# Patient Record
Sex: MALE | Race: WHITE | NOT HISPANIC OR LATINO | Employment: OTHER | URBAN - METROPOLITAN AREA
[De-identification: names, ages, dates, MRNs, and addresses within clinical notes are randomized per-mention and may not be internally consistent; named-entity substitution may affect disease eponyms.]

---

## 2020-08-12 ENCOUNTER — OFFICE VISIT (OUTPATIENT)
Dept: FAMILY MEDICINE | Facility: CLINIC | Age: 35
End: 2020-08-12
Payer: COMMERCIAL

## 2020-08-12 VITALS
WEIGHT: 112.4 LBS | OXYGEN SATURATION: 97 % | HEART RATE: 65 BPM | DIASTOLIC BLOOD PRESSURE: 67 MMHG | RESPIRATION RATE: 16 BRPM | TEMPERATURE: 98.3 F | SYSTOLIC BLOOD PRESSURE: 100 MMHG | BODY MASS INDEX: 17.64 KG/M2 | HEIGHT: 67 IN

## 2020-08-12 DIAGNOSIS — F41.1 GENERALIZED ANXIETY DISORDER: ICD-10-CM

## 2020-08-12 DIAGNOSIS — Z83.438 FAMILY HISTORY OF HYPERLIPIDEMIA: ICD-10-CM

## 2020-08-12 DIAGNOSIS — R00.2 PALPITATIONS: Primary | ICD-10-CM

## 2020-08-12 DIAGNOSIS — D22.9 BENIGN NEVUS: ICD-10-CM

## 2020-08-12 DIAGNOSIS — Z83.3 FAMILY HISTORY OF DIABETES MELLITUS: ICD-10-CM

## 2020-08-12 LAB
CHOLEST SERPL-MCNC: 210.9 MG/DL (ref 0–200)
CHOLEST/HDLC SERPL: 4.5 {RATIO} (ref 0–5)
GLUCOSE CASUAL: 83 MG/DL (ref 51–200)
HDLC SERPL-MCNC: 47.2 MG/DL
LDLC SERPL CALC-MCNC: 146 MG/DL (ref 0–129)
TRIGL SERPL-MCNC: 87.7 MG/DL (ref 0–150)
VLDL CHOLESTEROL: 17.5 MG/DL (ref 7–32)

## 2020-08-12 SDOH — HEALTH STABILITY: MENTAL HEALTH: HOW OFTEN DO YOU HAVE A DRINK CONTAINING ALCOHOL?: NOT ASKED

## 2020-08-12 ASSESSMENT — MIFFLIN-ST. JEOR: SCORE: 1398.47

## 2020-08-12 NOTE — PROGRESS NOTES
Preceptor Attestation:    Patient seen and evaluated in person. I discussed the patient with the resident. I personally viewed the EKG and agree with the interpretation documented by the resident. I have verified the content of the note, which accurately reflects my assessment of the patient and the plan of care.   Supervising Physician:  Cavlin Jensen MD.

## 2020-08-12 NOTE — LETTER
"August 17, 2020      Vikash Murillo  [address]  [facility] [address]        Dear Vikash,    Thank you for getting your care at [facility]. Please see below for your test results.    Resulted Orders   Lipid Cascade ([facility])   Result Value Ref Range    Cholesterol 210.9 (H) 0.0 - 200.0 mg/dL    Cholesterol/HDL Ratio 4.5 0.0 - 5.0    HDL Cholesterol 47.2 >40.0 mg/dL    Triglycerides 87.7 0.0 - 150.0 mg/dL    VLDL Cholesterol 17.5 7.0 - 32.0 mg/dL    LDL Cholesterol Calculated 146 (H) 0 - 129 mg/dL   Glucose Casual ([facility])   Result Value Ref Range    Glucose Casual 83.0 51.0 - 200.0 mg/dL     Your labs look good! Your LDL (\"bad cholesterol\") is slightly high, but not high enough that I would be concerned about it. Would just recommend what you are already doing which is good diet and exercise. Keep up the good work and let me know if there is anything else I can help with!    If you have any concerns about these results please call and leave a message for me or send a MyCFanchimpt message to the clinic.    Sincerely,    Jeffry Levy MD    "

## 2020-08-12 NOTE — PROGRESS NOTES
Male New Patient Note          HPI         Concerns today:     1. Complete physical. No PMH, Surgical Hx.     2. Mole on abdomen. Got darker two years ago, but hasn't changed since and not growing. No bleeding.    3. Palpitations, worse when running. Last for 1-2 seconds at a time. No chest pain, shortness of breath, syncope, pre-syncope.    4. Has also been anxious recently. Usually uses meditation, mindfulness to help. Has done counseling in the past. Currently his living situation is stressful for him and he is looking to move. He thinks his anxiety will be improved when this happens and declines any need for any other assistance with anxiety today.       There is no problem list on file for this patient.      History reviewed. No pertinent past medical history.     Previous Medical Care   [facility]     Family History   Problem Relation Age of Onset     Arthritis Mother      Diabetes Father      Lung Cancer Father      Heart Disease Maternal Grandfather      Hyperlipidemia Brother               Review of Systems:     Review of Systems:  A 10 point review of systems was performed and was negative except as noted above.    Sleep:   Do you snore most or the night (as reported by a family member)? No  Do you feel sleepy or extremely tired during most of the day? No           Social History     Social History     Socioeconomic History     Marital status: Single     Spouse name: Not on file     Number of children: Not on file     Years of education: Not on file     Highest education level: Not on file   Occupational History     Not on file   Social Needs     Financial resource strain: Not on file     Food insecurity     Worry: Not on file     Inability: Not on file     Transportation needs     Medical: Not on file     Non-medical: Not on file   Tobacco Use     Smoking status: Never Smoker     Smokeless tobacco: Never Used   Substance and Sexual Activity     Alcohol use: Not Currently     Drug use: Not Currently  "    Sexual activity: Yes     Partners: Female     Birth control/protection: Inserts/Ring   Lifestyle     Physical activity     Days per week: Not on file     Minutes per session: Not on file     Stress: Not on file   Relationships     Social connections     Talks on phone: Not on file     Gets together: Not on file     Attends Hoahaoism service: Not on file     Active member of club or organization: Not on file     Attends meetings of clubs or organizations: Not on file     Relationship status: Not on file     Intimate partner violence     Fear of current or ex partner: Not on file     Emotionally abused: Not on file     Physically abused: Not on file     Forced sexual activity: Not on file   Other Topics Concern     Not on file   Social History Narrative     Not on file       Marital Status:Single  Who lives in your household? Him and 2 roommates    Has anyone hurt you physically, for example by pushing, hitting, slapping or kicking you or forcing you to have sex? Denies  Do you feel threatened or controlled by a partner, ex-partner or anyone in your life? Denies    Sexual Health     Sexual concerns: No   STI History: Neg           Physical Exam:     Vitals: /67   Pulse 65   Temp 98.3  F (36.8  C) (Oral)   Resp 16   Ht 1.702 m (5' 7\")   Wt 51 kg (112 lb 6.4 oz)   SpO2 97%   BMI 17.60 kg/m    BMI= Body mass index is 17.6 kg/m .  Vitals were reviewed and were normal  GENERAL: healthy, alert and no distress  EYES: Eyes grossly normal to inspection, extraocular movements - intact, and PERRL  RESP: lungs clear to auscultation - no rales, no rhonchi, no wheezes  CV: regular rates and rhythm, normal S1 S2, no S3 or S4 and no murmur, no click or rub -  ABDOMEN: soft, no tenderness, no  hepatosplenomegaly, no masses, normal bowel sounds  MS: extremities- no gross deformities noted, no edema  SKIN: no suspicious lesions, no rashes; 2 mm round dark brown lesion noted on right lower abdomen with consistent " coloration throughout under examination with dermatoscope  NEURO: strength and tone- normal, sensory exam- grossly normal, mentation- intact, speech- normal, reflexes- symmetric  PSYCH: Alert and oriented times 3; speech- coherent , normal rate and volume; able to articulate logical thoughts, able to abstract reason, no tangential thoughts, no hallucinations or delusions, affect- anxious    Assessment and Plan      Vikash was seen today for establish care.    Diagnoses and all orders for this visit:    Palpitations  Likely these are PVC's as he is healthy and based on the history. We elected to do EKG today in clinic, which was normal and not concerning. I recommended returning for follow-up if worsening or if he wants to have a true diagnosis. Would consider Zio patch if he does return for this.   -     EKG 12-lead complete w/read - Clinics    Family history of diabetes mellitus  -     Glucose Casual ([facility])    Family history of hyperlipidemia  -     Lipid Cascade ([facility])    Generalized anxiety disorder  Has strategies in place. No additional assistance needed today. Would consider therapy again if worsens.    Benign nevus  Mole on abdomen appears benign under dermatoscope. Recommend follow-up if changing.      Options for treatment and follow-up care were reviewed with the patient. Vikash Murillo engaged in the decision making process and verbalized understanding of the options discussed and agreed with the final plan.    Jeffry Levy MD

## 2021-01-04 ENCOUNTER — HEALTH MAINTENANCE LETTER (OUTPATIENT)
Age: 36
End: 2021-01-04

## 2021-01-06 ENCOUNTER — OFFICE VISIT (OUTPATIENT)
Dept: FAMILY MEDICINE | Facility: CLINIC | Age: 36
End: 2021-01-06
Payer: COMMERCIAL

## 2021-01-06 VITALS
HEART RATE: 84 BPM | DIASTOLIC BLOOD PRESSURE: 64 MMHG | HEIGHT: 67 IN | BODY MASS INDEX: 17.92 KG/M2 | TEMPERATURE: 98.1 F | WEIGHT: 114.2 LBS | OXYGEN SATURATION: 98 % | SYSTOLIC BLOOD PRESSURE: 106 MMHG

## 2021-01-06 DIAGNOSIS — R00.2 PALPITATIONS: Primary | ICD-10-CM

## 2021-01-06 PROCEDURE — 99214 OFFICE O/P EST MOD 30 MIN: CPT | Mod: GC | Performed by: STUDENT IN AN ORGANIZED HEALTH CARE EDUCATION/TRAINING PROGRAM

## 2021-01-06 ASSESSMENT — MIFFLIN-ST. JEOR: SCORE: 1406.64

## 2021-01-06 NOTE — PATIENT INSTRUCTIONS
1. Look up CBT in relation to anxiety. If interested after reading more about it just let me know our therapist here do it.     2. Will order zio patch for you to try and identify if this are just feeling or a actual heart beat issue.     Here is the plan from today's visit    1. Palpitations  =    Thank you for coming to [facility] today.  Lab Testing:  **If you had lab testing today and your results are reassuring or normal they will be mailed to you or sent through [employer] within 7 days.   **If the lab tests need quick action we will call you with the results.  The phone number we will call with results is # [phone] (home) . If this is not the best number please call our clinic and change the number.  Medication Refills:  If you need any refills please call your pharmacy and they will contact us.   If you need to  your refill at a new pharmacy, please contact the new pharmacy directly. The new pharmacy will help you get your medications transferred faster.   Scheduling:  If you have any concerns about today's visit or wish to schedule another appointment please call our office during normal business hours [phone] (8-5:00 M-F)  If a referral was made to a [facility] and you don't get a call from central scheduling please call [phone].  If a Mammogram was ordered for you at [facility] call [phone] to schedule or change your appointment.  If you had an XRay/CT/Ultrasound/MRI ordered the number is [phone] to schedule or change your radiology appointment.   Medical Concerns:  If you have urgent medical concerns please call [phone] at any time of the day.    Austin Au MD

## 2021-01-06 NOTE — PROGRESS NOTES
Assessment & Plan     Palpitations    Review of external notes as documented above  - Humberto Buck 8/12 note and ECG       -8/12 ECG Notable for mild bradycardia and poor R wave progression in V5 V6 (my interpretation). Bradycardia likely explained by patient w high resting vagal tone and exercise; avid runner and . No history of pulmonary pathology; suspect poor r wave progression is red herring and can be a normal variant.   - given patient's symptoms occur at rest and exercise we discussed exercise EKG versus Zio patch.  Patient preferred having the ability to press the button when he was feeling symptoms and have it recorded.  So placed order for Zio patch.  Suspect palpitations are likely driven from anxiety.  In regards to anxiety, patient like to do an increase in his yoga and meditation.  Would like to look into CBT but was not open to referral at this time.  Medications will be his last option; advised to continue to stay off anxiety given it can induce palpitations and worsen anxiety      No follow-ups on file. Follow-up to be determined by zuleima Au MD  [facility]    Subjective     Vikash Murillo is a 35 year old who presents to clinic today for the following health issues     HPI     No caffeine or alcohol       Palpitations - throughout past 8 years or so and sometimes exercise related. But now more frequently and sometimes at rest. Can get a pre syncopal feeling occasionally w exercise. THC use the night before can precede the symptoms. Has stopped THC now after reading it can cause arrhythmia (aobut 1 week or so now off it). But hasn't noticed much change in symptoms so far.   - total time seems couple secords or so of fluttering in chest and then a tightness and feeling pounding in chest for minutes 10-20 or so after. Gets a little bit of faint feeling/light headedness for a minute or so when it first happens as well.   - Feels it more often at  "exercise but a good number of them happen at night as well.     Anxiety   Have lots anxiety about job, living situation. 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 Has done less yoga this year which he thinks makes anxiety worse as well. Meds would be a last resort for him. Would like to try just being more consistent with yoga/meditation.         Review of Systems   Constitutional, HEENT, cardiovascular, pulmonary, gi and gu systems are negative, except as otherwise noted.      Objective    /64   Pulse 84   Temp 98.1  F (36.7  C) (Oral)   Ht 1.702 m (5' 7\")   Wt 51.8 kg (114 lb 3.2 oz)   SpO2 98%   BMI 17.89 kg/m    Body mass index is 17.89 kg/m .     Physical Exam  Constitutional:       General: He is not in acute distress.     Appearance: He is well-developed.   HENT:      Head: Normocephalic and atraumatic.   Eyes:      General:         Right eye: No discharge.         Left eye: No discharge.      Conjunctiva/sclera: Conjunctivae normal.   Cardiovascular:      Rate and Rhythm: Normal rate and regular rhythm.      Pulses: Normal pulses.      Heart sounds: Normal heart sounds. No murmur.   Pulmonary:      Effort: Pulmonary effort is normal. No respiratory distress.   Musculoskeletal:         General: No deformity.   Skin:     Coloration: Skin is not pale.      Findings: No erythema or rash.   Neurological:      Mental Status: He is alert and oriented to person, place, and time.      Coordination: Coordination normal.   Psychiatric:         Mood and Affect: Mood normal.      Comments: Mood okay affect anxious                    "

## 2021-01-13 NOTE — PROGRESS NOTES
Preceptor Attestation:   Patient seen, evaluated and discussed with the resident. I have verified the content of the note, which accurately reflects my assessment of the patient and the plan of care.   Supervising Physician:  Cinthya Oconnor, DO

## 2021-03-09 ENCOUNTER — OFFICE VISIT (OUTPATIENT)
Dept: FAMILY MEDICINE | Facility: CLINIC | Age: 36
End: 2021-03-09
Payer: COMMERCIAL

## 2021-03-09 VITALS
HEART RATE: 66 BPM | RESPIRATION RATE: 16 BRPM | SYSTOLIC BLOOD PRESSURE: 97 MMHG | WEIGHT: 117 LBS | DIASTOLIC BLOOD PRESSURE: 68 MMHG | TEMPERATURE: 98.4 F | HEIGHT: 68 IN | BODY MASS INDEX: 17.73 KG/M2 | OXYGEN SATURATION: 99 %

## 2021-03-09 DIAGNOSIS — S93.505A SPRAIN OF FIFTH TOE OF LEFT FOOT, INITIAL ENCOUNTER: Primary | ICD-10-CM

## 2021-03-09 DIAGNOSIS — Z28.21 INFLUENZA VACCINATION DECLINED: ICD-10-CM

## 2021-03-09 PROCEDURE — 99213 OFFICE O/P EST LOW 20 MIN: CPT | Performed by: STUDENT IN AN ORGANIZED HEALTH CARE EDUCATION/TRAINING PROGRAM

## 2021-03-09 ASSESSMENT — MIFFLIN-ST. JEOR: SCORE: 1431.96

## 2021-03-09 NOTE — PATIENT INSTRUCTIONS
Patient Education     Toe Sprain  A sprain is a stretching or tearing of the ligaments that hold a joint together. There are no broken bones. Sprains generally take from 3-6 weeks to heal. A toe sprain may be treated by taping the injured toe to the next toe. This is called buddy taping. This protects the injured toe and holds it in position. Mild sprains may not need any additional support. If the toenail has been hurt badly, it may fall off in 1-2 weeks. A new one will usually start to grow back within a month.  Home care    Keep your leg elevated above heart level when sitting or lying down. This is very important during the first 48 hours to reduce swelling. Stay off the injured foot as much as possible until you can walk on it without pain. If needed, you may use crutches during the first week for this purpose. Crutches can be rented at many pharmacies or surgical/orthopedic supply stores.    You may be given a cast shoe to wear to prevent movement in your toe. If not, you can use a sandal or any shoe that does not put pressure on the injured toe until the swelling and pain go away. If using a sandal, be careful not to hit your foot against anything, since another injury could make the sprain worse.    Apply an ice pack over the injured area for 15 to 20 minutes every 3 to 6 hours. You should do this for the first 24 to 48 hours. You can make an ice pack by filling a plastic bag that seals at the top with ice cubes and then wrapping it with a thin towel. Continue to use ice packs for relief of pain and swelling as needed. As the ice melts, be careful to avoid getting your wrap, splint, or cast wet. As the ice melts, be careful to avoid getting any wrap, splint, tape, or cast wet. After 48 hours, apply heat from a warm shower or bath for 20 minutes several times daily. Alternating ice and heat may also be helpful.    If buddy tape was applied and it becomes wet or dirty, change it. You may replace it with paper,  plastic or cloth tape. Cloth tape and paper tapes must be kept dry. Apply gauze or cotton padding between the toes, especially near webbed area. This will help prevent the skin from getting moist and breaking down. Keep the don tape in place for at least 4 weeks, or as advised by your healthcare provider.    You may use over-the-counter pain medicine to control pain, unless another pain medicine was prescribed. If you have chronic liver or kidney disease or ever had a stomach ulcer or gastrointestinal bleeding, talk with your healthcare provider before using these medicines.    You may return to sports after healing, when you can run without pain.    Follow-up care  Follow up with your with your healthcare provider as advised. Sometimes fractures don t show up on the first X-ray. Bruises and sprains can sometimes hurt as much as a fracture. These injuries can take time to heal completely. If your symptoms don t improve or they get worse, talk with your healthcare provider. You may need a repeat X-ray. If X-rays were taken, you will be told of any new findings that may affect your care.  When to seek medical advice  Call your healthcare provider right away if any of these occur:    Redness, warmth, or fluid drainage from your toe    Pain or swelling increases    Toes become cold, blue, numb, or tingly  [employer] last reviewed this educational content on 5/1/2018 2000-2020 [employer]. All rights reserved. This information is not intended as a substitute for professional medical care. Always follow your healthcare professional's instructions.

## 2021-03-09 NOTE — PROGRESS NOTES
"    Assessment & Plan     Vikash was seen today for pain.    Diagnoses and all orders for this visit:    Sprain of fifth toe of left foot, initial encounter    Influenza vaccination declined    Discussed supportive cares and routine healing. See AVS. Discussed low yield of inaging, offered, and pt declined. Discussed taping. Recommended ibuprofen 600mg q6h prn. Also pt declined flu shot.     12 minutes spent on the date of the encounter doing chart review, history and exam, documentation and further activities as noted above      Return if symptoms worsen or fail to improve.    Kyle Santana DO  [facility] LUIS ENRIQUE Suh is a 36 year old who presents for the following health issues     HPI   Foot pain  Left small toe. Injured about a month ago. Slipped on stairs. No LOC. Or head injury. Iced for a few days and still some pain. Not bad enough to take meds. Just making sure he should not be doing anything else.         Objective    BP 97/68   Pulse 66   Temp 98.4  F (36.9  C) (Oral)   Resp 16   Ht 1.73 m (5' 8.11\")   Wt 53.1 kg (117 lb)   SpO2 99%   BMI 17.73 kg/m    Body mass index is 17.73 kg/m .  Physical Exam  Constitutional:       General: He is not in acute distress.     Appearance: He is well-developed.   HENT:      Head: Normocephalic and atraumatic.   Eyes:      General: No scleral icterus.     Extraocular Movements: Extraocular movements intact.   Cardiovascular:      Rate and Rhythm: Normal rate.      Heart sounds: Normal heart sounds.   Pulmonary:      Effort: Pulmonary effort is normal. No respiratory distress.   Musculoskeletal:      Right foot: Normal.      Left foot: Tenderness (small toe) present.      Comments: NO tenderness at L base of 5th MT   Neurological:      General: No focal deficit present.      Mental Status: He is alert and oriented to person, place, and time.   Psychiatric:         Thought Content: Thought content normal.           "

## 2021-10-11 ENCOUNTER — HEALTH MAINTENANCE LETTER (OUTPATIENT)
Age: 36
End: 2021-10-11

## 2022-01-26 ENCOUNTER — OFFICE VISIT (OUTPATIENT)
Dept: FAMILY MEDICINE | Facility: CLINIC | Age: 37
End: 2022-01-26
Payer: COMMERCIAL

## 2022-01-26 VITALS
HEIGHT: 68 IN | RESPIRATION RATE: 16 BRPM | WEIGHT: 117 LBS | SYSTOLIC BLOOD PRESSURE: 99 MMHG | TEMPERATURE: 98.1 F | OXYGEN SATURATION: 98 % | BODY MASS INDEX: 17.73 KG/M2 | HEART RATE: 73 BPM | DIASTOLIC BLOOD PRESSURE: 63 MMHG

## 2022-01-26 DIAGNOSIS — M79.644 PAIN IN FINGER OF BOTH HANDS: Primary | ICD-10-CM

## 2022-01-26 DIAGNOSIS — F41.1 GENERALIZED ANXIETY DISORDER: ICD-10-CM

## 2022-01-26 DIAGNOSIS — M79.645 PAIN IN FINGER OF BOTH HANDS: Primary | ICD-10-CM

## 2022-01-26 DIAGNOSIS — D22.9 ENLARGED SKIN MOLE: ICD-10-CM

## 2022-01-26 PROCEDURE — 99214 OFFICE O/P EST MOD 30 MIN: CPT | Performed by: FAMILY MEDICINE

## 2022-01-26 ASSESSMENT — MIFFLIN-ST. JEOR: SCORE: 1431.96

## 2022-01-26 NOTE — PROGRESS NOTES
Assessment & Plan     1. Pain in finger of both hands  Likely related to increased activity (guitar playing, occupational) and would benefit from exercises and techniques for preservation. No sign of CTS on exam.   - Occupational Therapy Referral; Future - hand therapy - they will call you     2. Enlarged skin mole  2 hyperpigmented skin tags vs. Moles on back that are irritating and pt would request removed. 1 large flat mole on center back that should be considered for removal based on size and difficulty to self-monitor  - Procedure Clinic - [facility] Internal Referral; Future - call to schedule at [facility]  [phone]    3. Digestive Problems  Beano or Alpha-Galactosidase with legumes/chickpeas    4. Generalized Anxiety Disorder  - Therapy referral for [facility] - they will call you    30 minutes spent on the date of the encounter doing chart review, history and exam, documentation and further activities per the note    No follow-ups on file.    Irene Mcgill MD  [facility] is a 36 year old who presents for the following health issues   HPI     Finger pain  Doesn't use phone as much as other people  Thumb motion or apping  Knuckles aching constantly  All 10 finger tips feel bruised, shockwave when using phone/tapping screen  x2-3 months  Not able to rest long enough for them to heal up  Plays piano and guitar.   Guitar playing has increased over the past 1 month  Started piano tunning, big hammer, adjusts in small incriments for 2 hours at a time, exacerbates thumb pain  Has had small amount of joint pain (wrist) which improved with bone broth and yoga (vegetarian).   Mom has raynauds    Moles  Right temple, back of neck, upper back, abdomen  Abdomen mole has changed since high school, maybe it has changed again  Big back moles are annoying  Has had one mole removed 20 years ago (normal?)    Digestive problems  Vegetarian  Many years  Small meals and  "snack a lot, fill up fast \"small body person\"  Chickpeas, kidney beans. Will eat a small amount and feel bloated and feel uncomfortably full for many hours  hummus not a problem, whole chickpeas are the problem  Previously thought had a gluten sensitiviy, but doesn't adjust diet because too difficult, will notice same symptoms if he eats a lot of wheat.   Experienced heartburn for first time in life this week \"after eating a bunch of crampy foods\"    Review of Systems         Objective    BP 99/63   Pulse 73   Temp 98.1  F (36.7  C) (Oral)   Resp 16   Ht 1.73 m (5' 8.11\")   Wt 53.1 kg (117 lb)   SpO2 98%   BMI 17.73 kg/m    Body mass index is 17.73 kg/m .  Physical Exam  Skin:                    Back:      5. Right abdomen:      4. Right mid-back      2. Center back:      3. Left shoulder:              "

## 2022-01-26 NOTE — PATIENT INSTRUCTIONS
Patient Education   Here is the plan from today's visit    1. Pain in finger of both hands  - Occupational Therapy Referral; Future - hand therapy - they will call you     2. Enlarged skin mole  - Procedure Clinic - [facility] Internal Referral; Future - call to schedule at [place]  [phone]    3. Digestive Problems  Beano or Alpha-Galactosidase with legumes/chickpeas    4. Generalized Anxiety Disorder  - Therapy referral for [facility] - they will call you    Please call or return to clinic if your symptoms don't go away.    Follow up plan  No follow-ups on file.    Thank you for coming to [facility] today.  Lab Testing:  **If you had lab testing today and your results are reassuring or normal they will be mailed to you or sent through [email or web address] within 7 days.   **If the lab tests need quick action we will call you with the results.  **If you are having labs done on a different day, please call [phone] to schedule at [facility] or [phone] for other [facility] Outpatient Lab locations. Labs do not offer walk-in appointments.  The phone number we will call with results is # [phone] (home) . If this is not the best number please call our clinic and change the number.  Medication Refills:  If you need any refills please call your pharmacy and they will contact us.   If you need to  your refill at a new pharmacy, please contact the new pharmacy directly. The new pharmacy will help you get your medications transferred faster.   Scheduling:  If you have any concerns about today's visit or wish to schedule another appointment please call our office during normal business hours [phone] (8-5:00 M-F)  If a referral was made to an [facility] specialty provider and you do not get a call from central scheduling, please refer to directions on your visit summary or call our office during normal business hours for assistance.   If a M[facility] was [facility] for you at [facility] call [phone] to schedule or change your appointment.  If you had an XRay/CT/Ultrasound/MRI ordered the number is [phone] to schedule or change your radiology appointment.   [facility] has limited ultrasound appointments available on Wednesdays, if you would like your ultrasound at [facility], please call [phone] to schedule.   Medical Concerns:  If you have urgent medical concerns please call [phone] at any time of the day.    Irene Mcgill MD

## 2022-01-30 ENCOUNTER — HEALTH MAINTENANCE LETTER (OUTPATIENT)
Age: 37
End: 2022-01-30

## 2022-02-01 NOTE — PROGRESS NOTES
Hand Therapy Initial Evaluation    Current Date:  2/4/2022    Diagnosis: Pain in finger of both hands  DOI: ~3-6 months (1/26/2022 MD order date)    Subjective:  Vikash Murillo is a 36 year old male.    Patient reports symptoms of the bilateral thumbs and fingers which occurred due to gradual onset/unknown. Since onset symptoms are Gradually getting worse.  General health as reported by patient is good.  Pertinent medical history includes:None  Medical allergies:none.  Surgical history: none.  Medication history: None.    Current occupation is music therapist,   Job Tasks: Computer Work, Repetitive Tasks    Occupational Profile Information:  Right hand dominant  Prior functional level:  independent-shared household chores  Patient reports symptoms of pain, stiffness/loss of motion and edema  Special tests:  see chart.    Previous treatment: None  Barriers include:none  Mobility: No difficulty  Transportation: drives  Currently working in normal job without restrictions  Leisure activities/hobbies: playing guitar, climbing, ice skating  Other: Pt has a dog, enjoys reading and walking with his dog    Functional Outcome Measure:   Upper Extremity Functional Index Score:  SCORE:   Column Totals: /80: 69   (A lower score indicates greater disability.)    Objective:  Pain Level (Scale 0-10)   2/4/2022   At Rest 4/10   With Use 6/10     Pain Description  Date 2/4/2022   Location thumb, index finger, long finger, ring finger and small finger   Pain Quality Aching and Dull   Frequency constant     Pain is worst  daytime   Exacerbated by  use, texting, piano tuning   Relieved by rest and none   Progression Gradually wrosening     Edema  None    Sensation   WNL throughout all nerve distributions; per patient report    ROM   WNL per visual observation    Strength   (Measured in pounds)  Pain Report: - none  + mild    ++ moderate    +++ severe    2/4/2022 2/4/2022   Trials R L   1  2  3 66 71   Average 66 71     Lat  Pinch 2/4/2022 2/4/2022   Trials R L   1  2  3 23 21   Average 23 21     3 Pt Pinch 2/4/2022 2/4/2022   Trials R L   1  2  3 21 17   Average 21 17     Assessment:  Patient presents with symptoms consistent with diagnosis of bilateral finger pain, with conservative intervention.     Patient's limitations or Problem List includes:  Pain, Weakness and Tightness in musculature of the bilateral thumb, index finger and long finger which interferes with the patient's ability to perform Self Care Tasks (eating), Work Tasks, Recreational Activities, Household Chores and Driving  as compared to previous level of function.    Rehab Potential:  Excellent - Return to full activity, no limitations    Patient will benefit from skilled Occupational Therapy to increase ROM, overall strength and stability of thumb and decrease pain to return to previous activity level and resume normal daily tasks and to reach their rehab potential.    Barriers to Learning:  No barrier    Communication Issues:  Patient appears to be able to clearly communicate and understand verbal and written communication and follow directions correctly.    Chart Review: Chart Review, Brief history including review of medical and/or therapy records relating to the presenting problem and Simple history review with patient    Identified Performance Deficits: feeding, functional mobility, driving and community mobility, health management and maintenance, home establishment and management, meal preparation and cleanup, work and leisure activities    Assessment of Occupational Performance:  5 or more Performance Deficits    Clinical Decision Making (Complexity): Low complexity    Treatment Explanation:  The following has been discussed with the patient:  RX ordered/plan of care  Anticipated outcomes  Possible risks and side effects    Plan:  Frequency:  1 X week, once daily  Duration:  for 6 weeks    Treatment Plan:   Modalities:  US, Fluidotherapy and  Paraffin  Therapeutic Exercise:  AROM, AAROM, PROM, Tendon Gliding, Blocking, Reverse Blocking, Place and Hold, Contract Relax, Extensor Tracking, Isotonics, Isometrics and Stabilization  Neuromuscular re-education:  Nerve Gliding, Coordination/Dexterity, Sensory re-education, Desensitization, Kinesthetic Training, Proprioceptive Training, Posture, Kinesiotaping, Strain Counter Strain, Isometrics, Stabilization  Manual Techniques:  Coordination/Dexterity, Joint mobilization, Scar mobilization, Friction massage, Myofascial release, Manual edema mobilization  Orthotic Fabrication:  Static  Self Care:  Self Care Tasks, Ergonomic Considerations and Work Tasks    Discharge Plan:  Achieve all LTG.  Independent in home treatment program.  Reach maximal therapeutic benefit.    Home Exercise Program:  Warmth  Elsberry massage to thumb  Chip clip webspace release  Thumb adductor/webspace stretch  Forearm PROM flexor/extensor stretch  Computer ergonomics    Next Visit:  Review/progress HEP  Review computer ergonomics

## 2022-02-04 ENCOUNTER — THERAPY VISIT (OUTPATIENT)
Dept: OCCUPATIONAL THERAPY | Facility: CLINIC | Age: 37
End: 2022-02-04
Attending: FAMILY MEDICINE
Payer: COMMERCIAL

## 2022-02-04 DIAGNOSIS — M79.644 PAIN IN FINGER OF BOTH HANDS: ICD-10-CM

## 2022-02-04 DIAGNOSIS — M79.645 PAIN IN FINGER OF BOTH HANDS: ICD-10-CM

## 2022-02-04 DIAGNOSIS — M79.645 BILATERAL THUMB PAIN: ICD-10-CM

## 2022-02-04 DIAGNOSIS — M79.644 BILATERAL THUMB PAIN: ICD-10-CM

## 2022-02-04 PROCEDURE — 97530 THERAPEUTIC ACTIVITIES: CPT | Mod: GO | Performed by: OCCUPATIONAL THERAPIST

## 2022-02-04 PROCEDURE — 97165 OT EVAL LOW COMPLEX 30 MIN: CPT | Mod: GO | Performed by: OCCUPATIONAL THERAPIST

## 2022-02-04 PROCEDURE — 97110 THERAPEUTIC EXERCISES: CPT | Mod: GO | Performed by: OCCUPATIONAL THERAPIST

## 2022-02-04 NOTE — PROGRESS NOTES
[facility]    OUTPATIENT Occupational Therapy ORTHOPEDIC EVALUATION  PLAN OF TREATMENT FOR OUTPATIENT REHABILITATION  (COMPLETE FOR INITIAL CLAIMS ONLY)  Patient's Last Name, First Name, M.I.  YOB: 1985  Vikash Murillo       Provider s Name:  [facility]   Medical Record No.  [id number]   Start of Care Date:  02/04/22   Onset Date:   01/26/22 (MD order date)   Type:     ___PT   _X__OT Medical Diagnosis:    Encounter Diagnosis   Name Primary?     Pain in finger of both hands         Treatment Diagnosis:  B finger pain        Goals:     02/04/22 0500   Goal #1   Goal #1 household chores   Previous Performance Level Independent   Current Functional Task Push   Current Performance Level 6/10 pain   STG Target Perfomance [facility]   STG Target Perform Level 5/10 pain grasping dishes   Due Date 03/18/22   LTG Target Task/Performance Pain free household chores   Due Date 04/29/22       Therapy Frequency:  1x/week  Predicted Duration of Therapy Intervention:  6 weeks    Carol Johansen OT                 I CERTIFY THE NEED FOR THESE SERVICES FURNISHED UNDER        THIS PLAN OF TREATMENT AND WHILE UNDER MY CARE     (Physician attestation of this document indicates review and certification of the therapy plan).                       Certification Date From:  02/04/22   Certification Date To:  05/04/22    Referring Provider:  Irene Mcgill    Initial Assessment        See Epic Evaluation SOC Date: 02/04/22

## 2022-02-11 ENCOUNTER — THERAPY VISIT (OUTPATIENT)
Dept: OCCUPATIONAL THERAPY | Facility: CLINIC | Age: 37
End: 2022-02-11
Payer: COMMERCIAL

## 2022-02-11 DIAGNOSIS — M79.644 PAIN IN FINGER OF BOTH HANDS: ICD-10-CM

## 2022-02-11 DIAGNOSIS — M79.645 PAIN IN FINGER OF BOTH HANDS: ICD-10-CM

## 2022-02-11 DIAGNOSIS — M79.645 BILATERAL THUMB PAIN: ICD-10-CM

## 2022-02-11 DIAGNOSIS — M79.644 BILATERAL THUMB PAIN: ICD-10-CM

## 2022-02-11 PROCEDURE — 97110 THERAPEUTIC EXERCISES: CPT | Mod: GO | Performed by: OCCUPATIONAL THERAPIST

## 2022-02-17 ENCOUNTER — THERAPY VISIT (OUTPATIENT)
Dept: OCCUPATIONAL THERAPY | Facility: CLINIC | Age: 37
End: 2022-02-17
Payer: COMMERCIAL

## 2022-02-17 DIAGNOSIS — M79.644 BILATERAL THUMB PAIN: ICD-10-CM

## 2022-02-17 DIAGNOSIS — M79.644 PAIN IN FINGER OF BOTH HANDS: ICD-10-CM

## 2022-02-17 DIAGNOSIS — M79.645 PAIN IN FINGER OF BOTH HANDS: ICD-10-CM

## 2022-02-17 DIAGNOSIS — M79.645 BILATERAL THUMB PAIN: ICD-10-CM

## 2022-02-17 PROCEDURE — 97535 SELF CARE MNGMENT TRAINING: CPT | Mod: GO | Performed by: OCCUPATIONAL THERAPIST

## 2022-02-17 PROCEDURE — 97110 THERAPEUTIC EXERCISES: CPT | Mod: GO | Performed by: OCCUPATIONAL THERAPIST

## 2022-03-25 ENCOUNTER — TELEPHONE (OUTPATIENT)
Dept: BEHAVIORAL HEALTH | Facility: CLINIC | Age: 37
End: 2022-03-25
Payer: COMMERCIAL

## 2022-03-25 NOTE — TELEPHONE ENCOUNTER
Salmar spoke with pt and scheduled initial TC therapy appointment on 03/31/22 @ 2 pm. Writer has sent documents via my chart and appointment reminder. Writer will reply to referral source.    Aleyda Ganrera  03/25/22  1217  ----- Message from Aubrey Atkins sent at 3/25/2022 12:01 PM CDT -----  Regarding: transition clinic referral  Transition Clinic Referral   [place] Only   Limited [place] Availability    Type of Referral:      __X__Therapy  _____Therapy & Medication (Psychiatry next level of care appointment needs to be scheduled)  _____Medication Only (Psychiatry next level of care appointment needs to be scheduled)  _____Diagnostic Assessment Only      Referring Provider Name: Aubrey Atkins    Clinician completing the assessment.     Referring Provider: OTHER: OP Intake    If known, referring provider Phone Number: [phone]    Reason for Transition Clinic Referral: Pt has a referral for therapy. Please help bridge the wait until they can be seen. Thanks!    Next Level of Care Patient Will Be Transitioned To:  [facility] Department: [facility]     Start Date for Next Level of Care Therapy (Required): 8/16/22  Provider  [facility]   Location Department: [facility]     Start Date for Next Level of Care Medication (Required): N/A  Provider  Location  TC Psychiatry cannot see patients who do not have active medical insurance    What Would Be Helpful from the Transition Clinic: Pt has a referral for therapy. Please help bridge the wait until they can be seen. Thanks!     Needs: NO    Does Patient Have Access to Technology: yes    Patient E-mail Address: [email or web address]    Current Patient Phone Number: [phone];     Clinician Gender Preference (if applicable): NO    Aubrey Atkins

## 2022-03-29 ENCOUNTER — OFFICE VISIT (OUTPATIENT)
Dept: FAMILY MEDICINE | Facility: CLINIC | Age: 37
End: 2022-03-29
Attending: FAMILY MEDICINE
Payer: COMMERCIAL

## 2022-03-29 VITALS
RESPIRATION RATE: 16 BRPM | TEMPERATURE: 97.5 F | DIASTOLIC BLOOD PRESSURE: 67 MMHG | HEART RATE: 81 BPM | WEIGHT: 114.4 LBS | SYSTOLIC BLOOD PRESSURE: 99 MMHG | OXYGEN SATURATION: 100 % | BODY MASS INDEX: 17.34 KG/M2

## 2022-03-29 DIAGNOSIS — D22.9 ENLARGED SKIN MOLE: Primary | ICD-10-CM

## 2022-03-29 DIAGNOSIS — L91.8 SKIN TAG: ICD-10-CM

## 2022-03-29 PROCEDURE — 88305 TISSUE EXAM BY PATHOLOGIST: CPT | Performed by: DERMATOLOGY

## 2022-03-29 PROCEDURE — 88342 IMHCHEM/IMCYTCHM 1ST ANTB: CPT | Performed by: DERMATOLOGY

## 2022-03-29 PROCEDURE — 11300 SHAVE SKIN LESION 0.5 CM/<: CPT | Mod: GC | Performed by: FAMILY MEDICINE

## 2022-03-29 PROCEDURE — 11301 SHAVE SKIN LESION 0.6-1.0 CM: CPT | Mod: GC | Performed by: FAMILY MEDICINE

## 2022-03-29 PROCEDURE — 99207 PR DROP WITH A PROCEDURE: CPT | Mod: 25 | Performed by: FAMILY MEDICINE

## 2022-03-29 NOTE — PROGRESS NOTES
[facility]  Skin [facility] and Skin Tag Removal Procedure Note    Vikash Murillo is a patient of Nadine Javier here for atypical nevus and skin tag removal.  Indications: Irritated moles versus skin tags and one enlarged atypical nevus    Consent: Affirmation of informed consent was signed and scanned into the medical record. Risks, benefits and alternatives were discussed. Patient's questions were elicited and answered.   Procedure safety checklist was completed:  Yes  Time Out (Pause for the Cause) completed: Yes    Preoperative Diagnosis: atypical nevus and Skin Tag   Location and size: LEFT mid back (1 cm)     Postoperative Diagnosis: same    Technique:   Skin prep Betadine  Anesthesia 2.5 cc 1% lidocaine, with epi   Biopsy size 1.2 cm  Biopsy taken via (shave, punch, incisional) Shave  Suture  none   Hemostasis  Drysol    EBL:    5 ml  Complications:  No  Tolerance:   Pt tolerated procedure well and was in stable condition.   Pathology sent Yes    Technique:   Used curved iris scissor to remove 2 skin tags in the following locations: Right mid/low back and LEFT upper shoulder  Skin prep drysol  Anesthesia none  EBL:   2 ml  Complications:  No  Tolerance:  Pt tolerated procedure well and was in stable condition. Sent for pathology.     Instructions:    Pt should keep dressing in place for 24 hours, then may change and apply antibiotic ointment and simple bandage. May shower after 24 hours.  Pt was instructed to call if bleeding, severe pain or foul smell.   Follow up in 2 weeks.      Resident: Asia Paiz MD  Faculty: Jan Andrews MD present for and supervised this entire procedure.

## 2022-03-29 NOTE — PROGRESS NOTES
Preceptor Attestation:   I was present for and supervised the entire procedure. I have verified the content of the note, which accurately reflects my assessment of the patient and the plan of care.   Supervising Physician:  Jan Andrews MD.

## 2022-03-29 NOTE — PATIENT INSTRUCTIONS
Patient Education   Here is the plan from today's visit    1. Enlarged skin mole  Keep dry for 24 hours  Apply triple antibiotic ointment and bandages every day after the techderm falls or or take off after 3 days from procedure  Can use tylenol or Ibuprofen for pain  Follow up in 2 weeks to assess healing   - Procedure Clinic - [facility] Internal Referral  - Dermatological Path Order and Indications; Standing  - Dermatological Path Order and Indications  - trunk, arms, legs  - trunk, arms, legs    2. Skin tag  - Dermatological Path Order and Indications; Standing  - Dermatological Path Order and Indications  - trunk, arms, legs  - trunk, arms, legs    Please call or return to clinic if your symptoms don't go away.    Follow up plan  Return in about 2 weeks (around 4/12/2022) for Wound check .    Thank you for coming to [facility] today.  Lab Testing:  **If you had lab testing today and your results are reassuring or normal they will be mailed to you or sent through [employer] within 7 days.   **If the lab tests need quick action we will call you with the results.  **If you are having labs done on a different day, please call [phone] to schedule at [facility] or [phone] for other [facility] Outpatient Lab locations. Labs do not offer walk-in appointments.  The phone number we will call with results is # [phone] (home) . If this is not the best number please call our clinic and change the number.  Medication Refills:  If you need any refills please call your pharmacy and they will contact us.   If you need to  your refill at a new pharmacy, please contact the new pharmacy directly. The new pharmacy will help you get your medications transferred faster.   Scheduling:  If you have any concerns about today's visit or wish to schedule another appointment please call our office during normal business hours [phone] (8-5:00 M-F)  If a referral was made to an [facility] specialty  provider and you do not get a call from central scheduling, please refer to directions on your visit summary or call our office during normal business hours for assistance.   If a Mammogram was ordered for you at the Breast Center call [phone] to schedule or change your appointment.  If you had an XRay/CT/Ultrasound/MRI ordered the number is [phone] to schedule or change your radiology appointment.   [facility] has limited ultrasound appointments available on Wednesdays, if you would like your ultrasound at [facility], please call [phone] to schedule.   Medical Concerns:  If you have urgent medical concerns please call [phone] at any time of the day.    Jan Andrews MD

## 2022-03-31 ENCOUNTER — DOCUMENTATION ONLY (OUTPATIENT)
Dept: BEHAVIORAL HEALTH | Facility: CLINIC | Age: 37
End: 2022-03-31
Payer: COMMERCIAL

## 2022-03-31 ENCOUNTER — VIRTUAL VISIT (OUTPATIENT)
Dept: BEHAVIORAL HEALTH | Facility: CLINIC | Age: 37
End: 2022-03-31
Payer: COMMERCIAL

## 2022-03-31 DIAGNOSIS — F41.8 OTHER SPECIFIED ANXIETY DISORDERS: Primary | ICD-10-CM

## 2022-03-31 NOTE — PROCEDURES
"      [facility]    Progress Note - Initial Visit    Patient  Name:  Vikash Murillo Date: 3/31/2022         Service Type: Individual     Visit Start Time: 1400  Visit End Time: 1451    Visit #: 1    Attendees: Client attended alone    Service Modality:  Video Visit:      Provider verified identity through the following two step process.  Patient provided:  Patient  and Patient address    Telemedicine Visit: The patient's condition can be safely assessed and treated via synchronous audio and visual telemedicine encounter.      Reason for Telemedicine Visit: Services only offered telehealth    Originating Site (Patient Location): Patient's home    Distant Site (Provider Location): [facility]    Consent:  The patient/guardian has verbally consented to: the potential risks and benefits of telemedicine (video visit) versus in person care; bill my insurance or make self-payment for services provided; and responsibility for payment of non-covered services.     Patient would like the video invitation sent by:  My Chart    Mode of Communication:  Video Conference via [employer]    As the provider I attest to compliance with applicable laws and regulations related to telemedicine.       DATA:   Interactive Complexity: No   Crisis: No     Presenting Concerns/  Current Stressors:   Vikash was referred to the Transition Clinic via his PCP due to experiencing symptoms of anxiety. Vikash reported he has had numerous triggers for worried thoughts including his job, finances, relationship, and \"life\". This has led to having many thoughts around these triggers, but not knowing what to do about them. Vikash has used therapy in the past to relieve anxiety. Vikash has a next level of care for 2022 with [facility].      Discussed transitional services being short term which Vikash agreed to and noted he would like to continue with clinician for short term. Clinician also noted they " would send referrals for clinics where Vikash could start long term therapy sooner. Vikash and clinician focused on explored his triggers for his anxiety and how he experiences anxiety. Explored how meditation and yoga has helped with anxiety in the past and discussed how to start incorporating these practices into daily life.       ASSESSMENT:  Mental Status Assessment:  Appearance:   Appropriate   Eye Contact:   Good   Psychomotor Behavior: Normal   Attitude:   Cooperative   Orientation:   All  Speech   Rate / Production: Normal/ Responsive   Volume:  Normal   Mood:    Normal  Affect:    Appropriate   Thought Content:  Clear   Thought Form:  Coherent  Logical   Insight:    Good       Safety Issues and Plan for Safety and Risk Management:   [place] Suicide Severity Rating Scale (Short Version)No flowsheet data found.  Patient denies current fears or concerns for personal safety.  Patient denies current or recent suicidal ideation or behaviors.  Patient denies current or recent homicidal ideation or behaviors.  Patient denies current or recent self injurious behavior or ideation.  Patient denies other safety concerns.  Recommended that patient call 911 or go to the local ED should there be a change in any of these risk factors.  Patient reports there are no firearms in the house.     Diagnostic Criteria:  Unspecified Anxiety Disorder  The client does not report enough symptoms for the full criteria of any specific Anxiety Disorder to have been met   - Excessive anxiety and worry about a number of events or activities (such as work or school performance).    - The person finds it difficult to control the worry.   - Difficulty concentrating or mind going blank.       DSM5 Diagnoses: (Sustained by DSM5 Criteria Listed Above)  Diagnoses: 300.09 (F41.8) Other Specified Anxiety Disorder   Psychosocial & Contextual Factors: work,financial, relationship stressors  WHODAS 2.0 (12 item):   WHODAS 2.0 Total Score 3/31/2022  3/31/2022   Total Score 18 18   Total Score MyChart - 18     Intervention:   Mindfulness- Patient was educated on relaxation and mindfulness techniques and Educated on treatment planning and started identifying goals and interventions for treatment plan  Collateral Reports Completed:  Not Applicable      PLAN: (Homework, other):  1. Provider will continue Diagnostic Assessment.  Patient was given the following to do until next session:  Use meditation at least one time per day.     2. Provider recommended the following referrals: long term outpatient therapy     3.  Suicide Risk and Safety Concerns were assessed for Vikash Murillo.    Patient meets the following risk assessment and triage: Patient denied any current/recent/lifetime history of suicidal ideation and/or behaviors.  No safety plan indicated at this time.       Dianne Mann LP  March 31, 2022

## 2022-03-31 NOTE — PROGRESS NOTES
"    [facility]  Provider Name:  Dianne Mann     Credentials:  JILL Conte    PATIENT'S NAME: Vikash Murillo  PREFERRED NAME: Vikash  PRONOUNS:       MRN: [id number]  : 1985  ADDRESS: [address] [place] [address] [address]  [facility] [address]  ACCT. NUMBER:  [id number]  DATE OF SERVICE: 3/31/22  START TIME: 1400  END TIME: 1451  PREFERRED PHONE: [phone]  May we leave a program related message: Yes  SERVICE MODALITY:  Video Visit:      Provider verified identity through the following two step process.  Patient provided:  Patient  and Patient address    Telemedicine Visit: The patient's condition can be safely assessed and treated via synchronous audio and visual telemedicine encounter.      Reason for Telemedicine Visit: Services only offered telehealth    Originating Site (Patient Location): Patient's home    Distant Site (Provider Location): [facility] HE[facility] & ADDI[facility]    Consent:  The patient/guardian has verbally consented to: the potential risks and benefits of telemedicine (video visit) versus in person care; bill my insurance or make self-payment for services provided; and responsibility for payment of non-covered services.     Patient would like the video invitation sent by:  My Chart    Mode of Communication:  Video Conference via Amwell    As the provider I attest to compliance with applicable laws and regulations related to telemedicine.    UNIVERSAL ADULT Mental Health DIAGNOSTIC ASSESSMENT    Identifying Information:  Patient is a 37 year old,  .  The pronoun use throughout this assessment reflects the patient's chosen pronoun.  Patient was referred for an assessment by self.  Patient attended the session alone.    Chief Complaint:   The reason for seeking services at this time is: \"Feeling overwhelmed and consistently down lately. Financial stress, existential sadness (not extreme), dissatisfaction with work. Hard to pinpoint " "when it began. Some of it many years ago.\".  The problem(s) began 03/03/22.    Patient has attempted to resolve these concerns in the past through therapy .    Social/Family History:  Patient reported they grew up in [place], [place].  They were raised by biological parents  .  Parents  / .  Patient reported that their childhood was \"okay, didn't feel listened to\".  Patient described their current relationships with family of origin as \"okay\".     The patient describes their cultural background as .  Cultural influences and impact on patient's life structure, values, norms, and healthcare: Grew up Syn[employer], never connected with it. I resonate more with eastern spiritual influences, to an extent. I meditate and practiced yoga regularly but that is unrelated to how I grew up..  Contextual influences on patient's health include: Individual Factors stress affects overall health .    These factors will be addressed in the Preliminary Treatment plan. Patient identified their preferred language to be English. Patient reported they does not need the assistance of an  or other support involved in therapy.     Patient reported had no significant delays in developmental tasks.   Patient's highest education level was college graduate  .  Patient identified the following learning problems: none reported.  Modifications will not be used to assist communication in therapy.  Patient reports they are  able to understand written materials.    Patient reported the following relationship history: .  Patient's current relationship status is has a partner or significant other for single.   Patient identified their sexual orientation as heterosexual.  Patient reported having   child(kwesi). Patient identified partner; mother; friends as part of their support system.  Patient identified the quality of these relationships as fair,  .      Patient's current living/housing situation involves staying in " own home/apartment.  The immediate members of family and household include Juli Murillo, 45,Sister  and they report that housing is stable.    Patient is currently employed part time.  Patient reports their finances are obtained through employment. Patient does identify finances as a current stressor.      Patient reported that they have not been involved with the legal system.   . Patient does not report being under probation/ parole/ jurisdiction. They are not under any current court jurisdiction. .    Patient's Strengths and Limitations:  Patient identified the following strengths or resources that will help them succeed in treatment: commitment to health and well being, friends / good social support, insight and intelligence. Things that may interfere with the patient's success in treatment include: financial hardship.     Assessments:  The following assessments were completed by patient for this visit:  PHQ9:   PHQ-9 SCORE 3/31/2022   PHQ-9 Total Score MyChart 6 (Mild depression)   PHQ-9 Total Score 6       Personal and Family Medical History:  Patient does not report a family history of mental health concerns.  Patient reports family history includes Arthritis in his mother; Diabetes in his father; Heart Disease in his maternal grandfather; Hyperlipidemia in his brother; Lung Cancer in his father..     Patient does report Mental Health Diagnosis and/or Treatment.  Patient Patient reported the following previous diagnoses which include(s):   .  Patient reported symptoms began around 10 years ago.  Patient has received mental health services in the past:     .  Psychiatric Hospitalizations:n/a   Patient denies a history of civil commitment.  Currently, patient   is not receiving other mental health services.  These include none.         Patient has not had a physical exam to rule out medical causes for current symptoms.  Date of last physical exam was greater than a year ago and client was encouraged to  schedule an exam with PCP. The patient has a [facility] Provider, who is named Nadine Dominique..  Patient reports no current medical and/or dental concerns.  Patient reports pain concerns including pain in hands.  Patient does not want help addressing pain concerns.   There are not significant appetite / nutritional concerns / weight changes.   Patient does not report a history of head injury / trauma / cognitive impairment.      Patient reports not taking any current medications    Medication Adherence:  Patient reports not currently prescribed.      Patient Allergies:  No Known Allergies    Medical History:  No past medical history on file.      Current Mental Status Exam:   Appearance:  Appropriate    Eye Contact:  Good   Psychomotor:  Normal       Gait / station:  no problem  Attitude / Demeanor: Cooperative   Speech      Rate / Production: Normal/ Responsive      Volume:  Normal  volume      Language:  intact  Mood:   Normal  Affect:   Appropriate    Thought Content: Clear   Thought Process: Coherent  Logical       Associations: No loosening of associations  Insight:   Good   Judgment:  Intact   Orientation:  All  Attention/concentration: Good      Substance Use:  Patient did not report a family history of substance use concerns; see medical history section for details.  Patient has not received chemical dependency treatment in the past.  Patient has not ever been to detox.      Patient is not currently receiving any chemical dependency treatment.           Substance History of use Age of first use Date of last use     Pattern and duration of use (include amounts and frequency)   Alcohol never used       REPORTS SUBSTANCE USE: N/A   Cannabis   currently use high school 03/27/22 REPORTS SUBSTANCE USE: reports using substance 2 times per week      Amphetamines   never used     REPORTS SUBSTANCE USE: N/A   Cocaine/crack    never used       REPORTS SUBSTANCE USE: N/A   Hallucinogens never used          REPORTS SUBSTANCE USE: N/A   Inhalants never used         REPORTS SUBSTANCE USE: N/A   Heroin never used         REPORTS SUBSTANCE USE: N/A   Other Opiates never used     REPORTS SUBSTANCE USE: N/A   Benzodiazepine   never used     REPORTS SUBSTANCE USE: N/A   Barbiturates never used     REPORTS SUBSTANCE USE: N/A   Over the counter meds never used     REPORTS SUBSTANCE USE: N/A   Caffeine used in the past 5   REPORTS SUBSTANCE USE: N/A   Nicotine  never used     REPORTS SUBSTANCE USE: N/A   Other substances not listed above:  Identify:  never used     REPORTS SUBSTANCE USE: N/A     Patient reported the following problems as a result of their substance use: no problems, not applicable.    Substance Use: No symptoms    Based on the negative CAGE score and clinical interview there  are not indications of drug or alcohol abuse.      Significant Losses / Trauma / Abuse / Neglect Issues:   Patient did not  serve in the .  There are indications or report of significant loss, trauma, abuse or neglect issues related to: are no indications and client denies any losses, trauma, abuse, or neglect concerns.  Concerns for possible neglect are not present.     Safety Assessment:   Patient denies current homicidal ideation and behaviors.  Patient denies current self-injurious ideation and behaviors.    Patient denied risk behaviors associated with substance use.  Patient denies any high risk behaviors associated with mental health symptoms.  Patient reports the following current concerns for their personal safety: None.  Patient reports there are not firearms in the house.       There are no firearms in the home..    History of Safety Concerns:  Patient denied a history of homicidal ideation.     Patient denied a history of personal safety concerns.    Patient denied a history of assaultive behaviors.    Patient denied a history of sexual assault behaviors.     Patient denied a history of risk behaviors associated  with substance use.  Patient denies any history of high risk behaviors associated with mental health symptoms.  Patient reports the following protective factors: forward or future oriented thinking; dedication to family or friends; regular sleep; regular physical activity; sense of belonging; purpose; sense of meaning; positive social skills; strong sense of self worth or esteem    Risk Plan:  See Recommendations for Safety and Risk Management Plan    Review of Symptoms per patient report:  Depression: No symptoms, Low self-worth, Ruminations and Feeling sad, down, or depressed  Neisha:  No Symptoms  Psychosis: No Symptoms  Anxiety: Excessive worry, Social anxiety and Ruminations  Panic:  No symptoms  Post Traumatic Stress Disorder:  No Symptoms   Eating Disorder: No Symptoms  ADD / ADHD:  No symptoms  Conduct Disorder: No symptoms  Autism Spectrum Disorder: No symptoms  Obsessive Compulsive Disorder: No Symptoms    Patient reports the following compulsive behaviors and treatment history:  n/a .      Diagnostic Criteria:   Unspecified Anxiety Disorder  The client does not report enough symptoms for the full criteria of any specific Anxiety Disorder to have been met   - Excessive anxiety and worry about a number of events or activities (such as work or school performance).    - The person finds it difficult to control the worry.    Functional Status:  Patient reports the following functional impairments:  relationship(s) and work / vocational responsibilities.     Nonprogrammatic care:  Patient is requesting basic services to address current mental health concerns.    Clinical Summary:  1. Reason for assessment: anxiety  .  2. Psychosocial, Cultural and Contextual Factors: financial, relationship, and work stressors  .  3. Principal DSM5 Diagnoses  (Sustained by DSM5 Criteria Listed Above):   300.09 (F41.8) Other Specified Anxiety Disorder .  4. Other Diagnoses that is relevant to services: n/a.  5. Provisional  Diagnosis:  300.23 (F40.10) Social Anxiety Disorder as evidenced by anxiety within social settings .  6. Prognosis: Return to Normal Functioning.  7. Likely consequences of symptoms if not treated: continued anxiety.  8. Client strengths include:  caring, creative, empathetic, good listener, insightful, motivated, support of family, friends and providers and wants to learn .     Recommendations:     1. Plan for Safety and Risk Management:   Recommended that patient call 911 or go to the local ED should there be a change in any of these risk factors..          Report to child / adult protection services was NA.     2. Patient's identified  n/a .     3. Initial Treatment will focus on:    Anxiety      4. Resources/Service Plan:    services are not indicated.   Modifications to assist communication are not indicated.   Additional disability accommodations are not indicated.      5. Collaboration:   Collaboration / coordination of treatment will be initiated with the following  support professionals: outpatient therapist.      6.  Referrals:   The following referral(s) will be initiated: [facility]. Next Scheduled Appointment: 4/7/2022.     A Release of Information has been obtained for the following:  n/a .    7. COURT:    COURT:  Discussed the general effects of drugs and alcohol on health and well-being. Provider gave patient printed information about the effects of chemical use on their health and well being. Recommendations:  n/a .     8. Records:   These were reviewed at time of assessment.   Information in this assessment was obtained from the medical record and  provided by patient who is a good historian.    Patient will have open access to their mental health medical record.        Provider Name/ Credentials:  Dianne Mann PsyD, JILL  March 31, 2022

## 2022-04-04 LAB
PATH REPORT.COMMENTS IMP SPEC: NORMAL
PATH REPORT.COMMENTS IMP SPEC: NORMAL
PATH REPORT.FINAL DX SPEC: NORMAL
PATH REPORT.GROSS SPEC: NORMAL
PATH REPORT.MICROSCOPIC SPEC OTHER STN: NORMAL
PATH REPORT.RELEVANT HX SPEC: NORMAL

## 2022-04-07 ENCOUNTER — VIRTUAL VISIT (OUTPATIENT)
Dept: BEHAVIORAL HEALTH | Facility: CLINIC | Age: 37
End: 2022-04-07
Payer: COMMERCIAL

## 2022-04-07 DIAGNOSIS — F41.8 OTHER SPECIFIED ANXIETY DISORDERS: Primary | ICD-10-CM

## 2022-04-08 NOTE — PROCEDURES
[facility]                                     Progress Note    Patient Name: Vikash Murillo  Date:            Service Type: Individual      Session Start Time:   Session End Time:      Session Length: 47 minutes    Session #: 2    Attendees: Client attended alone    Service Modality:  Video Visit:      Provider verified identity through the following two step process.  Patient provided:  Patient  and Patient address    Telemedicine Visit: The patient's condition can be safely assessed and treated via synchronous audio and visual telemedicine encounter.      Reason for Telemedicine Visit: Services only offered telehealth    Originating Site (Patient Location): Patient's home    Distant Site (Provider Location): [facility] HEALTH & ADDICTION SERVICES    Consent:  The patient/guardian has verbally consented to: the potential risks and benefits of telemedicine (video visit) versus in person care; bill my insurance or make self-payment for services provided; and responsibility for payment of non-covered services.     Patient would like the video invitation sent by:  My Chart    Mode of Communication:  Video Conference via Amwell    As the provider I attest to compliance with applicable laws and regulations related to telemedicine.    DATA  Interactive Complexity: No  Crisis: No        Progress Since Last Session (Related to Symptoms / Goals / Homework):   Symptoms: No change in anxiety in the last week    Homework: Achieved / completed to satisfaction      Episode of Care Goals: Minimal progress - ACTION (Actively working towards change); Intervened by reinforcing change plan / affirming steps taken     Current / Ongoing Stressors and Concerns:    Vikash was referred to the Transition Clinic via his PCP due to experiencing symptoms of anxiety. Vikash reported he has had numerous triggers for worried thoughts including his job, finances, relationship,  "and \"life\". This has led to having many thoughts around these triggers, but not knowing what to do about them. Vikash has used therapy in the past to relieve anxiety. Vikash has a next level of care for 8/2022 with [facility].     Vikash expressed continued anxiety in the past week with experiencing two anxiety attacks. Reviewed homework from the past week of using meditation at least twice in the last week and explored his experience with this. Continued to process triggers for anxiety and finding balance. Used ACT intentions to review values and his actions or things in his life that go against his values. Also practiced using cognitive restructuring with regards to thoughts about self.      Treatment Objective(s) Addressed in This Session:   use relaxation strategies two times per day to reduce the physical symptoms of anxiety       Intervention:   CBT: cognitive restructuring, mindfulness     ACT: Values exercise    Assessments completed prior to visit:  The following assessments were completed by patient for this visit:  GAD7:   NICA-7 SCORE 3/31/2022   Total Score 6 (mild anxiety)   Total Score 6         ASSESSMENT: Current Emotional / Mental Status (status of significant symptoms):   Risk status (Self / Other harm or suicidal ideation)   Patient denies current fears or concerns for personal safety.   Patient denies current or recent suicidal ideation or behaviors.   Patient denies current or recent homicidal ideation or behaviors.   Patient denies current or recent self injurious behavior or ideation.   Patient denies other safety concerns.   Patient reports there has been no change in risk factors since their last session.     Patient reports there has been no change in protective factors since their last session.     Recommended that patient call 911 or go to the local ED should there be a change in any of these risk factors.     Appearance:   Appropriate    Eye Contact:   Good    Psychomotor Behavior: Normal "    Attitude:   Cooperative    Orientation:   All   Speech    Rate / Production: Normal     Volume:  Normal    Mood:    Normal   Affect:    Appropriate    Thought Content:  Clear    Thought Form:  Coherent  Logical    Insight:    Good      Medication Review:   No current psychiatric medications prescribed     Medication Compliance:   NA     Changes in Health Issues:   None reported     Chemical Use Review:   Substance Use: Chemical use reviewed, no active concerns identified      Tobacco Use: No current tobacco use.      Diagnosis:  F41.8 Other specified anxiety disorders        Collateral Reports Completed:   Not Applicable    PLAN: (Patient Tasks / Therapist Tasks / Other)  Continue to use meditation throughout the week        Dianne Mann LP                                                         ______________________________________________________________________    Individual Treatment Plan    Patient's Name: Vikash Murillo  YOB: 1985    Date of Creation: 4/7/2022  Date Treatment Plan Last Reviewed/Revised: 4/7/2022 (new)    DSM5 Diagnoses: 300.09 (F41.8) Other Specified Anxiety Disorder   Psychosocial / Contextual Factors: work, relationship stress  PROMIS (reviewed every 90 days): 28    Referral / Collaboration:  Was/were discussed and patient will pursue.    Anticipated number of session for this episode of care: 10  Anticipation frequency of session: Weekly  Anticipated Duration of each session: 38-52 minutes  Treatment plan will be reviewed in 90 days or when goals have been changed.       MeasurableTreatment Goal(s) related to diagnosis / functional impairment(s)  Goal 1: Patient will increase awareness of anxiety and their impact on functioning and develop skills to reduce negative impact.       Objective #A  : Patient will describe thoughts, feelings, and actions associated with anxiety.        Intervention(s) : LM will explore and process with patient how anxiety has impacted them.        Objective #B : Patient will increase distress tolerance coping skills.       Intervention(s): LMHP will teach DBT skills and model their use.    Goal 2: Patient will identify cognitive distortions in relation to their anxiety and explore alternatives.       Objective #A: Patient will identify thoughts and beliefs about self and the world as they relate to anxiety.       Intervention(s): LMHP will guide discussion and assist patient in identification of negative beliefs.       Objective #B:  Patient will challenge negative cognitions.       Intervention(s): LMHP will guide patient in examining the evidence for and against beliefs.        Patient has reviewed and agreed to the above plan.      Dianne Mann, JILL  April 8, 2022

## 2022-04-11 ENCOUNTER — OFFICE VISIT (OUTPATIENT)
Dept: FAMILY MEDICINE | Facility: CLINIC | Age: 37
End: 2022-04-11
Payer: COMMERCIAL

## 2022-04-11 VITALS
WEIGHT: 113.4 LBS | HEART RATE: 75 BPM | RESPIRATION RATE: 16 BRPM | TEMPERATURE: 98.7 F | OXYGEN SATURATION: 99 % | BODY MASS INDEX: 17.19 KG/M2 | SYSTOLIC BLOOD PRESSURE: 94 MMHG | DIASTOLIC BLOOD PRESSURE: 63 MMHG

## 2022-04-11 DIAGNOSIS — R07.9 CHEST PAIN, UNSPECIFIED TYPE: Primary | ICD-10-CM

## 2022-04-11 DIAGNOSIS — Z09 S/P EXCISION OF SKIN LESION, FOLLOW-UP EXAM: ICD-10-CM

## 2022-04-11 LAB
CHOLEST SERPL-MCNC: 200 MG/DL
FASTING STATUS PATIENT QL REPORTED: ABNORMAL
HDLC SERPL-MCNC: 50 MG/DL
LDLC SERPL CALC-MCNC: 136 MG/DL
NONHDLC SERPL-MCNC: 150 MG/DL
TRIGL SERPL-MCNC: 70 MG/DL

## 2022-04-11 PROCEDURE — 80061 LIPID PANEL: CPT | Performed by: STUDENT IN AN ORGANIZED HEALTH CARE EDUCATION/TRAINING PROGRAM

## 2022-04-11 PROCEDURE — 99213 OFFICE O/P EST LOW 20 MIN: CPT | Mod: GC | Performed by: STUDENT IN AN ORGANIZED HEALTH CARE EDUCATION/TRAINING PROGRAM

## 2022-04-11 PROCEDURE — 36415 COLL VENOUS BLD VENIPUNCTURE: CPT | Performed by: STUDENT IN AN ORGANIZED HEALTH CARE EDUCATION/TRAINING PROGRAM

## 2022-04-11 PROCEDURE — 93000 ELECTROCARDIOGRAM COMPLETE: CPT | Mod: GC | Performed by: STUDENT IN AN ORGANIZED HEALTH CARE EDUCATION/TRAINING PROGRAM

## 2022-04-11 NOTE — PROGRESS NOTES
Assessment & Plan     Chest pain, unspecified type  Pt with constant chest pain, pt concerned as father had heart diease in his 50's, feels fluttery sometimes. Possible has an abnormal rhythm, but EKG with NSR while symptomatic and no signs of ST elevation or depression. Also not concerned for CAD as not affected by activity. No TTP of sternal border so not likely costochondritis. Most likely in related to anxiety has pt notes that it is worse or more pronounced when this is present. However, if were to have fluttery feelings while asleep or affecting sleep would suggest Zio patch.   - Lipid panel; Future  - EKG 12-lead complete w/read - Clinics  - Lipid panel    S/P excision of skin lesion, follow-up exam  Pt has well healing post shave lesions, no signs of infection. Needs to let scab over at this time. Reviewed biopsy results. The atypical one does not describe if atypia goes within the margins or not. Will message pathologist to see. If no reply in one week would ask patient if okay to do an e-consult about those results to make sure does not need more area of skin removed.       Return in about 2 weeks (around 4/25/2022), or if symptoms worsen or fail to improve.    Asia Paiz MD  [facility] LUIS ENRIQUE Suh is a 37 year old who presents for the following health issues     HPI     Skin Lesion Follow Up  Seems to be doing well, itchy, continues to moisten and dress it. No bleeding. No fevers.     Chest Discomfort  All day everyday last week, every now and then gets full body flutter feeling for a few secs, like when nervous, when distracted not there, started therapy feels related to anxiety, hard to take deep breaths, not hyperventaling, maybe with increased worry. No sudden deaths young in family. FMHx of HF. No radiating pain. No numbness/tingling. Hard to say if worse with exertion versus baseline. EKG in the past normal. Not worse with deep breath. Doesn't feel like  palpitations in the past. Has had more heartburn.    Review of Systems   Constitutional, HEENT, cardiovascular, pulmonary, gi and gu systems are negative, except as otherwise noted.      Objective    BP 94/63   Pulse 75   Temp 98.7  F (37.1  C) (Oral)   Resp 16   Wt 51.4 kg (113 lb 6.4 oz)   SpO2 99%   BMI 17.19 kg/m    Body mass index is 17.19 kg/m .  Physical Exam   GENERAL: healthy, alert and no distress  EYES: Eyes grossly normal to inspection, PERRL and conjunctivae and sclerae normal  NECK: no adenopathy, no asymmetry, masses, or scars and thyroid normal to palpation  RESP: lungs clear to auscultation - no rales, rhonchi or wheezes  CV: regular rate and rhythm, normal S1 S2, no S3 or S4, no murmur, click or rub, no peripheral edema and peripheral pulses strong, no TTP of sternal borders  MS: no gross musculoskeletal defects noted, no edema  SKIN: well healing areas of biopsy sites, granulation tissue forming, no pustular discharge  PSYCH: mentation appears normal and anxious    EKG: NSR    Office Visit on 03/29/2022   Component Date Value Ref Range Status     Case Report 03/29/2022    Final                    Value:Surgical Pathology Report                         Case: [id number]                                  Authorizing Provider:  Jan Andrews MD         Collected:           03/29/2022 09:54 AM          Ordering Location:     [facility]   Received:            03/29/2022 11:45 AM                                 [facility]                                                                      Pathologist:           Cyrus Orr MD                                                       Specimens:   A) - Skin, Upper Left Shoulder skin tag versus mole                                                 B) - Skin, Left Mid Back                                                                            C) - Skin, Right low back                                                                    Final Diagnosis 03/29/2022    Final                    Value:This result contains rich text formatting which cannot be displayed here.     Clinical Information 03/29/2022    Final                    Value:This result contains rich text formatting which cannot be displayed here.     Gross Description 03/29/2022    Final                    Value:This result contains rich text formatting which cannot be displayed here.     Microscopic Description 03/29/2022    Final                    Value:This result contains rich text formatting which cannot be displayed here.     Performing Labs 03/29/2022    Final                    Value:This result contains rich text formatting which cannot be displayed here.

## 2022-04-11 NOTE — PROGRESS NOTES
Preceptor Attestation:    I discussed the patient with the resident and evaluated the patient in person. I personally viewed the EKG and agree with the interpretation documented by the resident. I have verified the content of the note, which accurately reflects my assessment of the patient and the plan of care.   Supervising Physician:  Irene Mcgill MD.

## 2022-04-13 NOTE — RESULT ENCOUNTER NOTE
Dear Vikash  Here are your labs as you can see the your bad cholesterol (LDL) is mildly elevated. This is can be addressed with 150 min/wk of moderate exercise (fast walking), increasing fiber in your diet, and trying an Mediterranean diet. Can repeat labs in 1 year.   Please message me if you have any concerns.  Sincerely,   Asia Paiz MD

## 2022-04-14 ENCOUNTER — VIRTUAL VISIT (OUTPATIENT)
Dept: BEHAVIORAL HEALTH | Facility: CLINIC | Age: 37
End: 2022-04-14
Attending: PSYCHIATRY & NEUROLOGY
Payer: COMMERCIAL

## 2022-04-14 DIAGNOSIS — F41.8 OTHER SPECIFIED ANXIETY DISORDERS: Primary | ICD-10-CM

## 2022-04-14 NOTE — PROCEDURES
[facility] Transition Clinic                                     Progress Note    Patient Name: Vikash Murillo  Date: 2022         Service Type: Individual      Session Start Time: 1400  Session End Time: 1448     Session Length: 48 minutes    Session #: 3    Attendees: Client attended alone    Service Modality:  Video Visit:      Provider verified identity through the following two step process.  Patient provided:  Patient  and Patient address    Telemedicine Visit: The patient's condition can be safely assessed and treated via synchronous audio and visual telemedicine encounter.      Reason for Telemedicine Visit: Services only offered telehealth    Originating Site (Patient Location): Patient's home    Distant Site (Provider Location): [facility]    Consent:  The patient/guardian has verbally consented to: the potential risks and benefits of telemedicine (video visit) versus in person care; bill my insurance or make self-payment for services provided; and responsibility for payment of non-covered services.     Patient would like the video invitation sent by:  My Chart    Mode of Communication:  Video Conference via [employer]    As the provider I attest to compliance with applicable laws and regulations related to telemedicine.    DATA  Interactive Complexity: No  Crisis: No        Progress Since Last Session (Related to Symptoms / Goals / Homework):   Symptoms: Improving slight impovement in anxiety     Homework: Did not complete      Episode of Care Goals: Minimal progress - PREPARATION (Decided to change - considering how); Intervened by negotiating a change plan and determining options / strategies for behavior change, identifying triggers, exploring social supports, and working towards setting a date to begin behavior change     Current / Ongoing Stressors and Concerns:        Vikash was referred to the Transition Clinic via his PCP due to  "experiencing symptoms of anxiety. Vikash reported he has had numerous triggers for worried thoughts including his job, finances, relationship, and \"life\". This has led to having many thoughts around these triggers, but not knowing what to do about them. Vikash has used therapy in the past to relieve anxiety. Vikash has a next level of care for 8/2022 with [facility].             Vikash expressed limited decrease of anxiety and went to the doctor this past week due to chest pain that had been related to anxiety. Explored how Vikash is often in \"his head\" due to anxiety and has difficulty with being in the present. Discussed use of mindfulness along with use of meditation to help be more present. Explored and processed difficulty of using meditation when anxious.      Treatment Objective(s) Addressed in This Session:   use relaxation strategies two times per day to reduce the physical symptoms of anxiety     Intervention:   CBT: Psychoeducation on mindfulness and anxiety, identify triggers for anxiety    Assessments completed prior to visit:  The following assessments were completed by patient for this visit:  GAD7:   NICA-7 SCORE 3/31/2022   Total Score 6 (mild anxiety)   Total Score 6         ASSESSMENT: Current Emotional / Mental Status (status of significant symptoms):   Risk status (Self / Other harm or suicidal ideation)   Patient denies current fears or concerns for personal safety.   Patient denies current or recent suicidal ideation or behaviors.   Patient denies current or recent homicidal ideation or behaviors.   Patient denies current or recent self injurious behavior or ideation.   Patient denies other safety concerns.   Patient reports there has been no change in risk factors since their last session.     Patient reports there has been no change in protective factors since their last session.     Recommended that patient call 911 or go to the local ED should there be a change in any of these risk " factors.     Appearance:   Appropriate    Eye Contact:   Good    Psychomotor Behavior: Normal    Attitude:   Cooperative    Orientation:   All   Speech    Rate / Production: Normal     Volume:  Normal    Mood:    Normal   Affect:    Appropriate    Thought Content:  Clear    Thought Form:  Coherent  Logical    Insight:    Good      Medication Review:   No current psychiatric medications prescribed     Medication Compliance:   NA     Changes in Health Issues:   None reported     Chemical Use Review:   Substance Use: Chemical use reviewed, no active concerns identified      Tobacco Use: No current tobacco use.      Diagnosis:  f41.8 Other specified anxiety disorders        Collateral Reports Completed:   Not Applicable    PLAN: (Patient Tasks / Therapist Tasks / Other)  Continue to use meditation  Practice use of daily mindfulness        Dianne Mann LP                                                         _______________________________________________Individual Treatment Plan     Patient's Name: Vikash Murillo                       YOB: 1985     Date of Creation: 4/7/2022  Date Treatment Plan Last Reviewed/Revised: 4/7/2022 (new)     DSM5 Diagnoses: 300.09 (F41.8) Other Specified Anxiety Disorder   Psychosocial / Contextual Factors: work, relationship stress  PROMIS (reviewed every 90 days): 28     Referral / Collaboration:  Was/were discussed and patient will pursue.     Anticipated number of session for this episode of care: 10  Anticipation frequency of session: Weekly  Anticipated Duration of each session: 38-52 minutes  Treatment plan will be reviewed in 90 days or when goals have been changed.         MeasurableTreatment Goal(s) related to diagnosis / functional impairment(s)  Goal 1: Patient will increase awareness of anxiety and their impact on functioning and develop skills to reduce negative impact.                   Objective #A  : Patient will describe thoughts, feelings, and actions  associated with anxiety.                    Intervention(s) : LMHP will explore and process with patient how anxiety has impacted them.                   Objective #B : Patient will increase distress tolerance coping skills.                   Intervention(s): LMHP will teach DBT skills and model their use.     Goal 2: Patient will identify cognitive distortions in relation to their anxiety and explore alternatives.                   Objective #A: Patient will identify thoughts and beliefs about self and the world as they relate to anxiety.                   Intervention(s): LMHP will guide discussion and assist patient in identification of negative beliefs.                   Objective #B:  Patient will challenge negative cognitions.                   Intervention(s): LMHP will guide patient in examining the evidence for and against beliefs.          Patient has reviewed and agreed to the above plan.        Dianne Mann, JILL                  April 8, 2022

## 2022-04-15 NOTE — PATIENT INSTRUCTIONS
Patient Education   Here is the plan from today's visit    1. Chest pain, unspecified type  Follow up if worsens or doesn't improve  - Lipid panel; Future  - EKG 12-lead complete w/read - Clinics  - Lipid panel    2. S/P excision of skin lesion, follow-up exam  Allow to breath and scab over    Please call or return to clinic if your symptoms don't go away.    Follow up plan  Return in about 2 weeks (around 4/25/2022), or if symptoms worsen or fail to improve.    Thank you for coming to [facility] today.  Lab Testing:  **If you had lab testing today and your results are reassuring or normal they will be mailed to you or sent through [employer] within 7 days.   **If the lab tests need quick action we will call you with the results.  **If you are having labs done on a different day, please call [phone] to schedule at [facility] or [phone] for other [facility] Outpatient Lab locations. Labs do not offer walk-in appointments.  The phone number we will call with results is # [phone] (home) . If this is not the best number please call our clinic and change the number.  Medication Refills:  If you need any refills please call your pharmacy and they will contact us.   If you need to  your refill at a new pharmacy, please contact the new pharmacy directly. The new pharmacy will help you get your medications transferred faster.   Scheduling:  If you have any concerns about today's visit or wish to schedule another appointment please call our office during normal business hours [phone] (8-5:00 M-F)  If a referral was made to an [facility] specialty provider and you do not get a call from central scheduling, please refer to directions on your visit summary or call our office during normal business hours for assistance.   If a Mammogram was ordered for you at the Breast Center call [phone] to schedule or change your appointment.  If you had an XRay/CT/Ultrasound/MRI ordered the  number is [phone] to schedule or change your radiology appointment.   [facility] has limited ultrasound appointments available on Wednesdays, if you would like your ultrasound at [facility], please call [phone] to schedule.   Medical Concerns:  If you have urgent medical concerns please call [phone] at any time of the day.    Asia Paiz MD

## 2022-04-21 ENCOUNTER — VIRTUAL VISIT (OUTPATIENT)
Dept: BEHAVIORAL HEALTH | Facility: CLINIC | Age: 37
End: 2022-04-21
Attending: PSYCHIATRY & NEUROLOGY
Payer: COMMERCIAL

## 2022-04-21 DIAGNOSIS — F41.8 OTHER SPECIFIED ANXIETY DISORDERS: Primary | ICD-10-CM

## 2022-04-21 NOTE — PROCEDURES
[facility] Transition [facility]                                     Progress Note    Patient Name: Vikash Murillo  Date: 2022         Service Type: Individual      Session Start Time: 1400  Session End Time: 1447     Session Length: 47 minutes    Session #: 4    Attendees: Client attended alone    Service Modality:  Video Visit:      Provider verified identity through the following two step process.  Patient provided:  Patient  and Patient address    Telemedicine Visit: The patient's condition can be safely assessed and treated via synchronous audio and visual telemedicine encounter.      Reason for Telemedicine Visit: Services only offered telehealth    Originating Site (Patient Location): Patient's home    Distant Site (Provider Location): [facility]    Consent:  The patient/guardian has verbally consented to: the potential risks and benefits of telemedicine (video visit) versus in person care; bill my insurance or make self-payment for services provided; and responsibility for payment of non-covered services.     Patient would like the video invitation sent by:  My Chart    Mode of Communication:  Video Conference via Amwell    As the provider I attest to compliance with applicable laws and regulations related to telemedicine.    DATA  Interactive Complexity: No  Crisis: No        Progress Since Last Session (Related to Symptoms / Goals / Homework):   Symptoms: Improving decrease in anxiety; more mindful of anxiet triggers    Homework: Achieved / completed to satisfaction      Episode of Care Goals: Minimal progress - ACTION (Actively working towards change); Intervened by reinforcing change plan / affirming steps taken     Current / Ongoing Stressors and Concerns:   Vikash was referred to the Transition Clinic via his PCP due to experiencing symptoms of anxiety. Vikash reported he has had numerous triggers for worried thoughts including his job,  "finances, relationship, and \"life\". This has led to having many thoughts around these triggers, but not knowing what to do about them. Vikash has used therapy in the past to relieve anxiety. Vikash has a next level of care for 8/2022 with [facility].    Vikash noted he attended a virtual memorial for friend who committed suicide. Processed emotions related to suicide and thoughts that have come up this week related to his friend who passed away. Also processed anxiety related to work. Used cognitive restructuring around thoughts related to work. 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      Treatment Objective(s) Addressed in This Session:   use cognitive strategies identified in therapy to challenge anxious thoughts  Identify triggers for anxiety     Intervention:   CBT: cognitive restructuring, mindfulness        ASSESSMENT: Current Emotional / Mental Status (status of significant symptoms):   Risk status (Self / Other harm or suicidal ideation)   Patient denies current fears or concerns for personal safety.   Patient denies current or recent suicidal ideation or behaviors.   Patient denies current or recent homicidal ideation or behaviors.   Patient denies current or recent self injurious behavior or ideation.   Patient denies other safety concerns.   Patient reports there has been no change in risk factors since their last session.     Patient reports there has been no change in protective factors since their last session.     Recommended that patient call 911 or go to the local ED should there be a change in any of these risk factors.     Appearance:   Appropriate    Eye Contact:   Good    Psychomotor Behavior: Normal    Attitude:   Cooperative    Orientation:   All   Speech    Rate / Production: Normal     Volume:  Normal    Mood:    Normal   Affect:    Appropriate    Thought Content:  Clear    Thought Form:  Coherent  Logical    Insight:    Good      Medication Review:   No current psychiatric " medications prescribed     Medication Compliance:   NA     Changes in Health Issues:   None reported     Chemical Use Review:   Substance Use: Chemical use reviewed, no active concerns identified      Tobacco Use: No current tobacco use.      Diagnosis:  F41.8 Other specified anxiety disorders        Collateral Reports Completed:   Not Applicable    PLAN: (Patient Tasks / Therapist Tasks / Other)  Continue to practice mindfulness        Dianne Mann Psy.D, LP                                                         ______________________________________________________________________    Patient's Name: Vikash Murillo                       YOB: 1985     Date of Creation: 4/7/2022  Date Treatment Plan Last Reviewed/Revised: 4/7/2022 (new)     DSM5 Diagnoses: 300.09 (F41.8) Other Specified Anxiety Disorder   Psychosocial / Contextual Factors: work, relationship stress  PROMIS (reviewed every 90 days): 28     Referral / Collaboration:  Was/were discussed and patient will pursue.     Anticipated number of session for this episode of care: 10  Anticipation frequency of session: Weekly  Anticipated Duration of each session: 38-52 minutes  Treatment plan will be reviewed in 90 days or when goals have been changed.         MeasurableTreatment Goal(s) related to diagnosis / functional impairment(s)  Goal 1: Patient will increase awareness of anxiety and their impact on functioning and develop skills to reduce negative impact.                   Objective #A  : Patient will describe thoughts, feelings, and actions associated with anxiety.                    Intervention(s) : LMHP will explore and process with patient how anxiety has impacted them.                   Objective #B : Patient will increase distress tolerance coping skills.                   Intervention(s): LMHP will teach DBT skills and model their use.     Goal 2: Patient will identify cognitive distortions in relation to their anxiety and explore  alternatives.                   Objective #A: Patient will identify thoughts and beliefs about self and the world as they relate to anxiety.                   Intervention(s): LMHP will guide discussion and assist patient in identification of negative beliefs.                   Objective #B:  Patient will challenge negative cognitions.                   Intervention(s): LMHP will guide patient in examining the evidence for and against beliefs.          Patient has reviewed and agreed to the above plan.        Dianne Mann, JILL                  April 8, 2022

## 2022-04-28 ENCOUNTER — VIRTUAL VISIT (OUTPATIENT)
Dept: BEHAVIORAL HEALTH | Facility: CLINIC | Age: 37
End: 2022-04-28
Attending: PSYCHIATRY & NEUROLOGY
Payer: COMMERCIAL

## 2022-04-28 DIAGNOSIS — F41.8 OTHER SPECIFIED ANXIETY DISORDERS: Primary | ICD-10-CM

## 2022-04-28 NOTE — PROCEDURES
[facility] - [facility]                                     Progress Note    Patient Name: Vikash Murillo  Date: 2022         Service Type: Individual      Session Start Time: 135  Session End Time: 1448     Session Length: 49 minutes    Session #: 5    Attendees: Client attended alone    Service Modality:  Video Visit:      Provider verified identity through the following two step process.  Patient provided:  Patient  and Patient address    Telemedicine Visit: The patient's condition can be safely assessed and treated via synchronous audio and visual telemedicine encounter.      Reason for Telemedicine Visit: Services only offered telehealth    Originating Site (Patient Location): Patient's home    Distant Site (Provider Location): [facility]    Consent:  The patient/guardian has verbally consented to: the potential risks and benefits of telemedicine (video visit) versus in person care; bill my insurance or make self-payment for services provided; and responsibility for payment of non-covered services.     Patient would like the video invitation sent by:  My Chart    Mode of Communication:  Video Conference via Amwell    As the provider I attest to compliance with applicable laws and regulations related to telemedicine.    DATA  Interactive Complexity: No  Crisis: No        Progress Since Last Session (Related to Symptoms / Goals / Homework):   Symptoms: No change since last session    Homework:  worked on between session      Episode of Care Goals: Minimal progress - PREPARATION (Decided to change - considering how); Intervened by negotiating a change plan and determining options / strategies for behavior change, identifying triggers, exploring social supports, and working towards setting a date to begin behavior change     Current / Ongoing Stressors and Concerns:    Vikash was referred to the Transition [facility] via his PCP due to  "experiencing symptoms of anxiety. iVkash reported he has had numerous triggers for worried thoughts including his job, finances, relationship, and \"life\". This has led to having many thoughts around these triggers, but not knowing what to do about them. Vikash has used therapy in the past to relieve anxiety. Vikash has a next level of care for 8/2022 with [facility].    Vikash and clinician focused on areas of life where Vikash feels \"stuck\" in them and factors for feeling stuck. Explored pros and cons of making changed in different areas. Gave psychoeducation on burn-out with work. Validated clients feelings and thoughts throughout session and also challenged negative thinking.     Treatment Objective(s) Addressed in This Session:   use cognitive strategies identified in therapy to challenge anxious thoughts  Use relaxation techniques at least one time per week to decrease anxious feeling     Intervention:   CBT: cognitive restructuring, psychoeducation    Assessments completed prior to visit:  The following assessments were completed by patient for this visit:  GAD7:   NICA-7 SCORE 3/31/2022   Total Score 6 (mild anxiety)   Total Score 6         ASSESSMENT: Current Emotional / Mental Status (status of significant symptoms):   Risk status (Self / Other harm or suicidal ideation)   Patient denies current fears or concerns for personal safety.   Patient denies current or recent suicidal ideation or behaviors.   Patient denies current or recent homicidal ideation or behaviors.   Patient denies current or recent self injurious behavior or ideation.   Patient denies other safety concerns.   Patient reports there has been no change in risk factors since their last session.     Patient reports there has been no change in protective factors since their last session.     Recommended that patient call 911 or go to the local ED should there be a change in any of these risk factors.     Appearance:   Appropriate    Eye Contact:   Good    Psychomotor " Behavior: Normal    Attitude:   Cooperative    Orientation:   All   Speech    Rate / Production: Normal     Volume:  Normal    Mood:    Normal   Affect:    Appropriate    Thought Content:  Clear    Thought Form:  Coherent  Logical    Insight:    Good      Medication Review:   No current psychiatric medications prescribed     Medication Compliance:   NA     Changes in Health Issues:   None reported     Chemical Use Review:   Substance Use: Chemical use reviewed, no active concerns identified      Tobacco Use: No current tobacco use.      Diagnosis:  F41.8 Other specified anxiety disorders        Collateral Reports Completed:   Not Applicable    PLAN: (Patient Tasks / Therapist Tasks / Other)  Continue to practice mindfulness and meditation        Dianne Mann Psy.D, LP                                                         _________________________________________________________________Patient's Name: Vikash Murillo                       YOB: 1985     Date of Creation: 4/7/2022  Date Treatment Plan Last Reviewed/Revised: 4/7/2022 (new)     DSM5 Diagnoses: 300.09 (F41.8) Other Specified Anxiety Disorder   Psychosocial / Contextual Factors: work, relationship stress  PROMIS (reviewed every 90 days): 28     Referral / Collaboration:  Was/were discussed and patient will pursue.     Anticipated number of session for this episode of care: 10  Anticipation frequency of session: Weekly  Anticipated Duration of each session: 38-52 minutes  Treatment plan will be reviewed in 90 days or when goals have been changed.         MeasurableTreatment Goal(s) related to diagnosis / functional impairment(s)  Goal 1: Patient will increase awareness of anxiety and their impact on functioning and develop skills to reduce negative impact.                   Objective #A  : Patient will describe thoughts, feelings, and actions associated with anxiety.                    Intervention(s) : LMHP will explore and process with  patient how anxiety has impacted them.                   Objective #B : Patient will increase distress tolerance coping skills.                   Intervention(s): LMHP will teach DBT skills and model their use.     Goal 2: Patient will identify cognitive distortions in relation to their anxiety and explore alternatives.                   Objective #A: Patient will identify thoughts and beliefs about self and the world as they relate to anxiety.                   Intervention(s): LMHP will guide discussion and assist patient in identification of negative beliefs.                   Objective #B:  Patient will challenge negative cognitions.                   Intervention(s): LMHP will guide patient in examining the evidence for and against beliefs.          Patient has reviewed and agreed to the above plan.        Dianne Mann, JILL                  April 8, 2022

## 2022-05-05 ENCOUNTER — VIRTUAL VISIT (OUTPATIENT)
Dept: BEHAVIORAL HEALTH | Facility: CLINIC | Age: 37
End: 2022-05-05
Attending: PSYCHIATRY & NEUROLOGY
Payer: COMMERCIAL

## 2022-05-05 DIAGNOSIS — F41.8 OTHER SPECIFIED ANXIETY DISORDERS: Primary | ICD-10-CM

## 2022-05-05 NOTE — PROCEDURES
[facility]                                     Progress Note    Patient Name: Vikash Murillo  Date: 2022         Service Type: Individual      Session Start Time: 1400  Session End Time: 1449     Session Length: 49 minutes    Session #: 6    Attendees: Client attended alone    Service Modality:  Video Visit:      Provider verified identity through the following two step process.  Patient provided:  Patient  and Patient address    Telemedicine Visit: The patient's condition can be safely assessed and treated via synchronous audio and visual telemedicine encounter.      Reason for Telemedicine Visit: Services only offered telehealth    Originating Site (Patient Location): Patient's home    Distant Site (Provider Location): [facility] & ADDI[facility]    Consent:  The patient/guardian has verbally consented to: the potential risks and benefits of telemedicine (video visit) versus in person care; bill my insurance or make self-payment for services provided; and responsibility for payment of non-covered services.     Patient would like the video invitation sent by:  My Chart    Mode of Communication:  Video Conference via Amwell    As the provider I attest to compliance with applicable laws and regulations related to telemedicine.    DATA  Interactive Complexity: No  Crisis: No        Progress Since Last Session (Related to Symptoms / Goals / Homework):   Symptoms: Improving feeling less anxious in the past week    Homework:  completed between sessions      Episode of Care Goals: Satisfactory progress - ACTION (Actively working towards change); Intervened by reinforcing change plan / affirming steps taken     Current / Ongoing Stressors and Concerns:   Vikash was referred to the Transition Clinic via his PCP due to experiencing symptoms of anxiety. Vikash reported he has had numerous triggers for worried thoughts including his job, finances,  "relationship, and \"life\". This has led to having many thoughts around these triggers, but not knowing what to do about them. Vikash has used therapy in the past to relieve anxiety. Vikash has a next level of care for 8/2022 with [facility].     Vikash reported feeling overwhelmed with \"everything I have to do and want to do\". Validated feelings and thoughts around this. Explored strategies to use when having many goals and explored cognitive strategies to use when wanting to do more activities or goals than what he is mentally capable of using. Although Vikash has been feeling overwhelmed, he also noted that his anxiety has decreased and is \"feeling better\". Discussed use of cognitive diffusion between physical symptoms of anxiety and emotional symptoms of anxiety.      Treatment Objective(s) Addressed in This Session:   use at least 3 coping skills for anxiety management in the next 1 weeks  Use at least one cognitive strategy to help manage anxious thoughts     Intervention:   CBT: cognitive diffusion, cognitive restructuring, behavior changes    Assessments completed prior to visit:  The following assessments were completed by patient for this visit:  GAD7:   NICA-7 SCORE 3/31/2022   Total Score 6 (mild anxiety)   Total Score 6         ASSESSMENT: Current Emotional / Mental Status (status of significant symptoms):   Risk status (Self / Other harm or suicidal ideation)   Patient denies current fears or concerns for personal safety.   Patient denies current or recent suicidal ideation or behaviors.   Patient denies current or recent homicidal ideation or behaviors.   Patient denies current or recent self injurious behavior or ideation.   Patient denies other safety concerns.   Patient reports there has been no change in risk factors since their last session.     Patient reports there has been no change in protective factors since their last session.     Recommended that patient call 911 or go to the local ED should there be a change " in any of these risk factors.     Appearance:   Appropriate    Eye Contact:   Good    Psychomotor Behavior: Normal    Attitude:   Cooperative    Orientation:   All   Speech    Rate / Production: Normal     Volume:  Normal    Mood:    Normal   Affect:    Appropriate    Thought Content:  Clear    Thought Form:  Coherent  Logical    Insight:    Good      Medication Review:   No current psychiatric medications prescribed     Medication Compliance:   NA     Changes in Health Issues:   None reported     Chemical Use Review:   Substance Use: Chemical use reviewed, no active concerns identified      Tobacco Use: No current tobacco use.      Diagnosis:  1. Other specified anxiety disorders        Collateral Reports Completed:   Not Applicable    PLAN: (Patient Tasks / Therapist Tasks / Other)  Continue to use mindfulness  Practice using cognitive restructuring with anxious thoughts  Practice setting small goals for self each week.        Dianne Mann Psy.D, LP                                                         ______________________________________________________________________    Patient's Name: Vikash Murillo                       YOB: 1985     Date of Creation: 4/7/2022  Date Treatment Plan Last Reviewed/Revised: 4/7/2022 (new)     DSM5 Diagnoses: 300.09 (F41.8) Other Specified Anxiety Disorder   Psychosocial / Contextual Factors: work, relationship stress  PROMIS (reviewed every 90 days): 28     Referral / Collaboration:  Was/were discussed and patient will pursue.     Anticipated number of session for this episode of care: 10  Anticipation frequency of session: Weekly  Anticipated Duration of each session: 38-52 minutes  Treatment plan will be reviewed in 90 days or when goals have been changed.         MeasurableTreatment Goal(s) related to diagnosis / functional impairment(s)  Goal 1: Patient will increase awareness of anxiety and their impact on functioning and develop skills to reduce  negative impact.                   Objective #A  : Patient will describe thoughts, feelings, and actions associated with anxiety.                    Intervention(s) : LMHP will explore and process with patient how anxiety has impacted them.                   Objective #B : Patient will increase distress tolerance coping skills.                   Intervention(s): LMHP will teach DBT skills and model their use.     Goal 2: Patient will identify cognitive distortions in relation to their anxiety and explore alternatives.                   Objective #A: Patient will identify thoughts and beliefs about self and the world as they relate to anxiety.                   Intervention(s): LMHP will guide discussion and assist patient in identification of negative beliefs.                   Objective #B:  Patient will challenge negative cognitions.                   Intervention(s): LMHP will guide patient in examining the evidence for and against beliefs.          Patient has reviewed and agreed to the above plan.        Dianne Mann LP                  April 8, 2022

## 2022-05-12 ENCOUNTER — VIRTUAL VISIT (OUTPATIENT)
Dept: BEHAVIORAL HEALTH | Facility: CLINIC | Age: 37
End: 2022-05-12
Attending: PSYCHIATRY & NEUROLOGY
Payer: COMMERCIAL

## 2022-05-12 NOTE — PROCEDURES
[facility] - Transition Clinic                                     Progress Note    Patient Name: Vikash Murillo  Date: 2022         Service Type: Individual      Session Start Time: 1400  Session End Time: 1447     Session Length: 47 minutes    Session #: 7    Attendees: Client attended alone    Service Modality:  Video Visit:      Provider verified identity through the following two step process.  Patient provided:  Patient  and Patient address    Telemedicine Visit: The patient's condition can be safely assessed and treated via synchronous audio and visual telemedicine encounter.      Reason for Telemedicine Visit: Services only offered telehealth    Originating Site (Patient Location): Patient's home    Distant Site (Provider Location): [facility] & [facility]DI[facility]    Consent:  The patient/guardian has verbally consented to: the potential risks and benefits of telemedicine (video visit) versus in person care; bill my insurance or make self-payment for services provided; and responsibility for payment of non-covered services.     Patient would like the video invitation sent by:  My Chart    Mode of Communication:  Video Conference via Amwell    As the provider I attest to compliance with applicable laws and regulations related to telemedicine.    DATA  Interactive Complexity: No  Crisis: No        Progress Since Last Session (Related to Symptoms / Goals / Homework):   Symptoms: Improving decrease of anxiety symptoms, feeling less anxious throughout the week    Homework:  worked on between sessions      Episode of Care Goals: Satisfactory progress - ACTION (Actively working towards change); Intervened by reinforcing change plan / affirming steps taken     Current / Ongoing Stressors and Concerns:    Vikash was referred to the Transition Clinic via his PCP due to experiencing symptoms of anxiety. Vikash reported he has had numerous triggers for worried thoughts  "including his job, finances, relationship, and \"life\". This has led to having many thoughts around these triggers, but not knowing what to do about them. Vikash has used therapy in the past to relieve anxiety. Vikash has a next level of care for 8/2022 with [facility].    Reviewed homework from last week and how goal setting has been helping with feeling overwhelmed. Processed stress related to life experiences he feels he is missing out on. Validated thoughts and feelings around this and used cognitive restructuring strategies to help with decreasing the feelings of stress.          Treatment Objective(s) Addressed in This Session:   use cognitive strategies identified in therapy to challenge anxious thoughts       Intervention:   CBT: cognitive restructuring, mindfulness    Assessments completed prior to visit:  The following assessments were completed by patient for this visit:  GAD7:   NICA-7 SCORE 3/31/2022   Total Score 6 (mild anxiety)   Total Score 6         ASSESSMENT: Current Emotional / Mental Status (status of significant symptoms):   Risk status (Self / Other harm or suicidal ideation)   Patient denies current fears or concerns for personal safety.   Patient denies current or recent suicidal ideation or behaviors.   Patient denies current or recent homicidal ideation or behaviors.   Patient denies current or recent self injurious behavior or ideation.   Patient denies other safety concerns.   Patient reports there has been no change in risk factors since their last session.     Patient reports there has been no change in protective factors since their last session.     Recommended that patient call 911 or go to the local ED should there be a change in any of these risk factors.     Appearance:   Appropriate    Eye Contact:   Good    Psychomotor Behavior: Normal    Attitude:   Cooperative    Orientation:   All   Speech    Rate / Production: Normal     Volume:  Normal    Mood:    Normal   Affect:    Appropriate "    Thought Content:  Clear    Thought Form:  Coherent  Logical    Insight:    Good      Medication Review:   No current psychiatric medications prescribed     Medication Compliance:   NA     Changes in Health Issues:   None reported     Chemical Use Review:   Substance Use: Chemical use reviewed, no active concerns identified      Tobacco Use: No current tobacco use.      Diagnosis:  No diagnosis found.    Collateral Reports Completed:   Not Applicable    PLAN: (Patient Tasks / Therapist Tasks / Other)  Continue to practice use of meditation weekly        Dainne Mann Psy.D, LP                                                         ______________________________________________________________________  Patient's Name: Vikash Murillo                       YOB: 1985     Date of Creation: 4/7/2022  Date Treatment Plan Last Reviewed/Revised: 4/7/2022 (new)     DSM5 Diagnoses: 300.09 (F41.8) Other Specified Anxiety Disorder   Psychosocial / Contextual Factors: work, relationship stress  PROMIS (reviewed every 90 days): 28     Referral / Collaboration:  Was/were discussed and patient will pursue.     Anticipated number of session for this episode of care: 10  Anticipation frequency of session: Weekly  Anticipated Duration of each session: 38-52 minutes  Treatment plan will be reviewed in 90 days or when goals have been changed.         MeasurableTreatment Goal(s) related to diagnosis / functional impairment(s)  Goal 1: Patient will increase awareness of anxiety and their impact on functioning and develop skills to reduce negative impact.                   Objective #A  : Patient will describe thoughts, feelings, and actions associated with anxiety.                    Intervention(s) : LMHP will explore and process with patient how anxiety has impacted them.                   Objective #B : Patient will increase distress tolerance coping skills.                   Intervention(s): LMHP will teach DBT  skills and model their use.     Goal 2: Patient will identify cognitive distortions in relation to their anxiety and explore alternatives.                   Objective #A: Patient will identify thoughts and beliefs about self and the world as they relate to anxiety.                   Intervention(s): LMHP will guide discussion and assist patient in identification of negative beliefs.                   Objective #B:  Patient will challenge negative cognitions.                   Intervention(s): LMHP will guide patient in examining the evidence for and against beliefs.          Patient has reviewed and agreed to the above plan.        Dianne Mann LP                  April 8, 2022

## 2022-05-19 ENCOUNTER — VIRTUAL VISIT (OUTPATIENT)
Dept: BEHAVIORAL HEALTH | Facility: CLINIC | Age: 37
End: 2022-05-19
Attending: PSYCHIATRY & NEUROLOGY
Payer: COMMERCIAL

## 2022-05-19 DIAGNOSIS — F41.8 OTHER SPECIFIED ANXIETY DISORDERS: Primary | ICD-10-CM

## 2022-05-19 NOTE — PROCEDURES
[facility]                                     Progress Note    Patient Name: Vikash Murillo  Date: 2022         Service Type: Individual      Session Start Time: 1400  Session End Time: 1445     Session Length: 45 minutes    Session #: 8    Attendees: Client attended alone    Service Modality:  Video Visit:      Provider verified identity through the following two step process.  Patient provided:  Patient  and Patient address    Telemedicine Visit: The patient's condition can be safely assessed and treated via synchronous audio and visual telemedicine encounter.      Reason for Telemedicine Visit: Services only offered telehealth    Originating Site (Patient Location): Patient's home    Distant Site (Provider Location): [facility]ENT[facility] & [facility]DI[facility]    Consent:  The patient/guardian has verbally consented to: the potential risks and benefits of telemedicine (video visit) versus in person care; bill my insurance or make self-payment for services provided; and responsibility for payment of non-covered services.     Patient would like the video invitation sent by:  My Chart    Mode of Communication:  Video Conference via Amwell    As the provider I attest to compliance with applicable laws and regulations related to telemedicine.    DATA  Interactive Complexity: No  Crisis: No        Progress Since Last Session (Related to Symptoms / Goals / Homework):   Symptoms: Improving decreased anxiety/ insight into more symptoms and experiences with anxiety    Homework:  completed between sessions      Episode of Care Goals: Satisfactory progress - ACTION (Actively working towards change); Intervened by reinforcing change plan / affirming steps taken     Current / Ongoing Stressors and Concerns:     Vikash was referred to the [facility] via his PCP due to experiencing symptoms of anxiety. Vikash reported he has had numerous triggers for worried thoughts  "including his job, finances, relationship, and \"life\". This has led to having many thoughts around these triggers, but not knowing what to do about them. Vikash has used therapy in the past to relieve anxiety. Vikash has a next level of care for 8/2022 with [facility].     Vikash continued to explore anxiety when being in different experiences he \"should be enjoying\" and difficulty with using mindfulness. Processed his experience when trying to use mindfulness in these experiences and how is anxiety manifests. Discussed using senses with mindfulness and how to use his senses in these experiences. Gave homework to practice using mindfulness with his senses and writing or thinking about two positive things with his experience when it has completed.      Treatment Objective(s) Addressed in This Session:   use cognitive strategies identified in therapy to challenge anxious thoughts       Intervention:   DBT: mindfulness    Assessments completed prior to visit:  The following assessments were completed by patient for this visit:  GAD7:   NICA-7 SCORE 3/31/2022   Total Score 6 (mild anxiety)   Total Score 6         ASSESSMENT: Current Emotional / Mental Status (status of significant symptoms):   Risk status (Self / Other harm or suicidal ideation)   Patient denies current fears or concerns for personal safety.   Patient denies current or recent suicidal ideation or behaviors.   Patient denies current or recent homicidal ideation or behaviors.   Patient denies current or recent self injurious behavior or ideation.   Patient denies other safety concerns.   Patient reports there has been no change in risk factors since their last session.     Patient reports there has been no change in protective factors since their last session.     Recommended that patient call 911 or go to the local ED should there be a change in any of these risk factors.     Appearance:   Appropriate    Eye Contact:   Good    Psychomotor Behavior: Normal "    Attitude:   Cooperative    Orientation:   All   Speech    Rate / Production: Normal     Volume:  Normal    Mood:    Normal   Affect:    Appropriate    Thought Content:  Clear    Thought Form:  Coherent  Logical    Insight:    Good      Medication Review:   No current psychiatric medications prescribed     Medication Compliance:   NA     Changes in Health Issues:   None reported     Chemical Use Review:   Substance Use: Chemical use reviewed, no active concerns identified      Tobacco Use: No current tobacco use.      Diagnosis:  F41.8 Other specified anxiety disorders        Collateral Reports Completed:   Not Applicable    PLAN: (Patient Tasks / Therapist Tasks / Other)  Practice use of mindfulness        Dianne Mann Psy.D, LP                                                         ______________________________________________________________________    Patient's Name: Vikash Murillo                       YOB: 1985     Date of Creation: 4/7/2022  Date Treatment Plan Last Reviewed/Revised: 4/7/2022 (new)     DSM5 Diagnoses: 300.09 (F41.8) Other Specified Anxiety Disorder   Psychosocial / Contextual Factors: work, relationship stress  PROMIS (reviewed every 90 days): 28     Referral / Collaboration:  Was/were discussed and patient will pursue.     Anticipated number of session for this episode of care: 10  Anticipation frequency of session: Weekly  Anticipated Duration of each session: 38-52 minutes  Treatment plan will be reviewed in 90 days or when goals have been changed.         MeasurableTreatment Goal(s) related to diagnosis / functional impairment(s)  Goal 1: Patient will increase awareness of anxiety and their impact on functioning and develop skills to reduce negative impact.                   Objective #A  : Patient will describe thoughts, feelings, and actions associated with anxiety.                    Intervention(s) : [facility] will explore and process with patient how anxiety has  impacted them.                   Objective #B : Patient will increase distress tolerance coping skills.                   Intervention(s): LMHP will teach DBT skills and model their use.     Goal 2: Patient will identify cognitive distortions in relation to their anxiety and explore alternatives.                   Objective #A: Patient will identify thoughts and beliefs about self and the world as they relate to anxiety.                   Intervention(s): LMHP will guide discussion and assist patient in identification of negative beliefs.                   Objective #B:  Patient will challenge negative cognitions.                   Intervention(s): LMHP will guide patient in examining the evidence for and against beliefs.          Patient has reviewed and agreed to the above plan.        Dianne Mann, JILL                  April 8, 2022

## 2022-05-26 ENCOUNTER — VIRTUAL VISIT (OUTPATIENT)
Dept: BEHAVIORAL HEALTH | Facility: CLINIC | Age: 37
End: 2022-05-26
Attending: PSYCHIATRY & NEUROLOGY
Payer: COMMERCIAL

## 2022-05-26 DIAGNOSIS — F41.8 OTHER SPECIFIED ANXIETY DISORDERS: Primary | ICD-10-CM

## 2022-05-26 NOTE — PROCEDURES
[facility]                                     Progress Note    Patient Name: Vikash Murillo  Date: 2022         Service Type: Individual      Session Start Time: 1400  Session End Time: 144     Session Length: 44 minutes    Session #: 9    Attendees: Client attended alone    Service Modality:  Video Visit:      Provider verified identity through the following two step process.  Patient provided:  Patient  and Patient address    Telemedicine Visit: The patient's condition can be safely assessed and treated via synchronous audio and visual telemedicine encounter.      Reason for Telemedicine Visit: Services only offered telehealth    Originating Site (Patient Location): Patient's home    Distant Site (Provider Location): [facility] & ADDICTION SERVICES    Consent:  The patient/guardian has verbally consented to: the potential risks and benefits of telemedicine (video visit) versus in person care; bill my insurance or make self-payment for services provided; and responsibility for payment of non-covered services.     Patient would like the video invitation sent by:  My Chart    Mode of Communication:  Video Conference via Amwell    As the provider I attest to compliance with applicable laws and regulations related to telemedicine.    DATA  Interactive Complexity: No  Crisis: No        Progress Since Last Session (Related to Symptoms / Goals / Homework):   Symptoms: Improving decrease in anxiety symptoms    Homework:  completed between sessions      Episode of Care Goals: Satisfactory progress - ACTION (Actively working towards change); Intervened by reinforcing change plan / affirming steps taken     Current / Ongoing Stressors and Concerns:   Vikash was referred to the [facility] via his PCP due to experiencing symptoms of anxiety. Vikash reported he has had numerous triggers for worried thoughts including his job, finances, relationship, and  "\"life\". This has led to having many thoughts around these triggers, but not knowing what to do about them. Vikash has used therapy in the past to relieve anxiety. Vikash has a next level of care for 8/2022 with [facility].     Vikash reviewed homework that was given last week to use first choice in making decisions and using mindfulness after that point. Noted he has felt less anxious in this past week and feeling hopeful for the future. Vikash's presence throughout session aligned with this as he was calm and relaxed throughout the whole time. Discussed doing more things that \"make him happy\" and not putting pressure on self to do things he thinks he needs to do. Discussed discharging at next session which Vikash agreed to.      Treatment Objective(s) Addressed in This Session:   use at least 2 coping skills for anxiety management in the next 1 weeks       Intervention:   DBT: mindfulness    Assessments completed prior to visit:  The following assessments were completed by patient for this visit:  GAD7:   NICA-7 SCORE 3/31/2022   Total Score 6 (mild anxiety)   Total Score 6         ASSESSMENT: Current Emotional / Mental Status (status of significant symptoms):   Risk status (Self / Other harm or suicidal ideation)   Patient denies current fears or concerns for personal safety.   Patient denies current or recent suicidal ideation or behaviors.   Patient denies current or recent homicidal ideation or behaviors.   Patient denies current or recent self injurious behavior or ideation.   Patient denies other safety concerns.   Patient reports there has been no change in risk factors since their last session.     Patient reports there has been no change in protective factors since their last session.     Recommended that patient call 911 or go to the local ED should there be a change in any of these risk factors.     Appearance:   Appropriate    Eye Contact:   Good    Psychomotor Behavior: Normal    Attitude:   Cooperative "    Orientation:   All   Speech    Rate / Production: Normal     Volume:  Normal    Mood:    Normal   Affect:    Appropriate    Thought Content:  Clear    Thought Form:  Coherent  Logical    Insight:    Good      Medication Review:   No current psychiatric medications prescribed     Medication Compliance:   NA     Changes in Health Issues:   None reported     Chemical Use Review:   Substance Use: Chemical use reviewed, no active concerns identified      Tobacco Use: No current tobacco use.      Diagnosis:  F41.8 Other specified anxiety disorders        Collateral Reports Completed:   Not Applicable    PLAN: (Patient Tasks / Therapist Tasks / Other)  Continue to practice mindfulness.  Discharge after next session.        Dianne Mann Psy.D, LP                                                         ______________________________________________________________________     Patient's Name: Vikash Murillo                       YOB: 1985     Date of Creation: 4/7/2022  Date Treatment Plan Last Reviewed/Revised: 4/7/2022 (new)     DSM5 Diagnoses: 300.09 (F41.8) Other Specified Anxiety Disorder   Psychosocial / Contextual Factors: work, relationship stress  PROMIS (reviewed every 90 days): 28     Referral / Collaboration:  Was/were discussed and patient will pursue.     Anticipated number of session for this episode of care: 10  Anticipation frequency of session: Weekly  Anticipated Duration of each session: 38-52 minutes  Treatment plan will be reviewed in 90 days or when goals have been changed.         MeasurableTreatment Goal(s) related to diagnosis / functional impairment(s)  Goal 1: Patient will increase awareness of anxiety and their impact on functioning and develop skills to reduce negative impact.                   Objective #A  : Patient will describe thoughts, feelings, and actions associated with anxiety.                    Intervention(s) : LM will explore and process with patient how  anxiety has impacted them.                   Objective #B : Patient will increase distress tolerance coping skills.                   Intervention(s): LMHP will teach DBT skills and model their use.     Goal 2: Patient will identify cognitive distortions in relation to their anxiety and explore alternatives.                   Objective #A: Patient will identify thoughts and beliefs about self and the world as they relate to anxiety.                   Intervention(s): LMHP will guide discussion and assist patient in identification of negative beliefs.                   Objective #B:  Patient will challenge negative cognitions.                   Intervention(s): LMHP will guide patient in examining the evidence for and against beliefs.          Patient has reviewed and agreed to the above plan.        Dianne Mann, JILL                  April 8, 2022

## 2022-06-02 ENCOUNTER — VIRTUAL VISIT (OUTPATIENT)
Dept: BEHAVIORAL HEALTH | Facility: CLINIC | Age: 37
End: 2022-06-02
Attending: PSYCHIATRY & NEUROLOGY
Payer: COMMERCIAL

## 2022-06-02 DIAGNOSIS — F41.8 OTHER SPECIFIED ANXIETY DISORDERS: Primary | ICD-10-CM

## 2022-06-02 NOTE — PROCEDURES
Discharge Summary  Multiple Sessions    Client Name: Viksah Murillo MRN#: [id number] YOB: 1985    Discharge Date:   2022    Service Modality: Video Visit:      Provider verified identity through the following two step process.  Patient provided:  Patient  and Patient address    Telemedicine Visit: The patient's condition can be safely assessed and treated via synchronous audio and visual telemedicine encounter.      Reason for Telemedicine Visit: Services only offered telehealth    Originating Site (Patient Location): Patient's home    Distant Site (Provider Location): [facility] & ADDICTION SERVICES    Consent:  The patient/guardian has verbally consented to: the potential risks and benefits of telemedicine (video visit) versus in person care; bill my insurance or make self-payment for services provided; and responsibility for payment of non-covered services.     Patient would like the video invitation sent by:  My Chart    Mode of Communication:  Video Conference via Amwell    As the provider I attest to compliance with applicable laws and regulations related to telemedicine.    Service Type: Individual      Session Start Time: 1400  Session End Time: 1438      Session Length: 38 minutes     Session #: 10     Attendees: Client attended alone      Focus of Treatment Objective(s):  Client's presenting concerns included: Anxiety - worried thoughts, physical symptoms of anxiety  Stage of Change at time of Discharge: MAINTENANCE (Working to maintain change, with risk of relapse)    Medication Adherence:  No    Chemical Use:  No    Assessment: Current Emotional / Mental Status (status of significant symptoms):    Risk status (Self / Other harm or suicidal ideation)  Client denies current fears or concerns for personal safety.  Client denies current or recent suicidal ideation or behaviors.  Client denies current or recent homicidal ideation or  behaviors.  Client denies current or recent self injurious behavior or ideation.  Client denies other safety concerns.  A safety and risk management plan has not been developed at this time, however client was given the after-hours number should there be a change in any of these risk factors.    Appearance:   Appropriate   Eye Contact:   Good   Psychomotor Behavior: Normal   Attitude:   Cooperative   Orientation:   All  Speech   Rate / Production: Normal    Volume:  Normal   Mood:    Normal  Affect:    Appropriate   Thought Content:  Clear   Thought Form:  Coherent  Logical   Insight:   Good     DSM5 Diagnoses: (Sustained by DSM5 Criteria Listed Above)  Diagnoses: 300.09 (F41.8) Other Specified Anxiety Disorder   Psychosocial & Contextual Factors: work stress    Reason for Discharge:  Client is satisfied with progress and Goals completed      Aftercare Plan:  Client may resume counseling services at any time in the future     Dianne Mann Psy.D, LP  June 2, 2022

## 2022-06-28 ENCOUNTER — OFFICE VISIT (OUTPATIENT)
Dept: FAMILY MEDICINE | Facility: CLINIC | Age: 37
End: 2022-06-28
Payer: COMMERCIAL

## 2022-06-28 VITALS
OXYGEN SATURATION: 99 % | WEIGHT: 115.8 LBS | HEART RATE: 60 BPM | RESPIRATION RATE: 16 BRPM | SYSTOLIC BLOOD PRESSURE: 102 MMHG | HEIGHT: 68 IN | TEMPERATURE: 98.2 F | BODY MASS INDEX: 17.55 KG/M2 | DIASTOLIC BLOOD PRESSURE: 64 MMHG

## 2022-06-28 DIAGNOSIS — M25.511 ACUTE PAIN OF RIGHT SHOULDER: Primary | ICD-10-CM

## 2022-06-28 DIAGNOSIS — M75.101 ROTATOR CUFF SYNDROME, RIGHT: ICD-10-CM

## 2022-06-28 PROCEDURE — 99213 OFFICE O/P EST LOW 20 MIN: CPT | Performed by: FAMILY MEDICINE

## 2022-06-28 NOTE — PROGRESS NOTES
Assessment & Plan     Acute pain of right shoulder  Gasper Parsons, PT  - Physical Therapy Referral; Future    Rotator cuff syndrome, right  Painful with pulling, lifting overhead  Was  and hasn't been doing as much, possible core loss  Also not climbing and then went back to it, possibly factoring into current pain               Regular exercise  See Patient Instructions    Return in about 6 weeks (around 8/9/2022), or if symptoms worsen or fail to improve.    Pavithra Dey MD  [facility] LUIS ENRIQUE Suh is a 37 year old accompanied by his self., presenting for the following health issues:  Shoulder Pain (Right shoulder pain, patient states a similar thing has happened in the past due to overuse and improved on its own, but in the last week, pain has been getting worse over time. Patient states combination of factors may have contributed (push-ups, rock climbing, position for playing guitar), patient states pain/discomfort is constant )      HPI     Pt is a RHD 36 yo white male with right shoulder and upper back pain.  Tight and sore.  Lateral pain noted with pushing over area, muscle soreness.  Push-ups for a few weeks, thought it was ok.  Thought it was ok until it wasn't.  Playing guitar and rock climbs.  Starting to feel soreness now and no meds taken.  Heating pad and ice at times.  Resting for 1.5 weeks now.  Music therapist and .  Holding a guitar and positioning has been hard.  Very tight over past week and improving over 4 days.  Overhead motions, dog pulling in front  Pulling to get dog to go.      How many servings of fruits and vegetables do you eat daily?  4 or more , vegetarian and lots of beans    On average, how many sweetened beverages do you drink each day (Examples: soda, juice, sweet tea, etc.  Do NOT count diet or artificially sweetened beverages)?   0, low sugar, cookies and ice cream    How many days per week do you exercise  "enough to make your heart beat faster? 7, runs, hiking, HIIT exercises, yoga practice, dog gets out to exercise    How many minutes a day do you exercise enough to make your heart beat faster? 20 - 29    How many days per week do you miss taking your medication? 0  Sleep is average, pretty good        Review of Systems   Constitutional, HEENT, cardiovascular, pulmonary, gi and gu systems are negative, except as otherwise noted.      Objective    /64 (BP Location: Left arm, Patient Position: Sitting, Cuff Size: Adult Small)   Pulse 60   Temp 98.2  F (36.8  C) (Oral)   Resp 16   Ht 1.727 m (5' 8\")   Wt 52.5 kg (115 lb 12.8 oz)   SpO2 99%   BMI 17.61 kg/m    Body mass index is 17.61 kg/m .  Physical Exam     Palpation:  Non-tender SC joint, clavicle, AC joint, acromion, subacromial space, proximal bicep tendon and upper trapezius muscle   Range of Motion        Active:all normal        Passive: all normal  Strength: rotator cuff strength full  Special tests: Negative Neer's test, Negative Lopez, Negative Cross-Arm adduction,  Positive Candelaria's  Overall strength intact  Deltoid strength, bicep strength, pec major intact                    .  ..  "

## 2022-06-28 NOTE — PATIENT INSTRUCTIONS
Ice, Heat    Tylenol or Ibuprofen,-needs with food    Rows and lat pull downs for climbing    Yoga practice

## 2022-07-20 ENCOUNTER — THERAPY VISIT (OUTPATIENT)
Dept: PHYSICAL THERAPY | Facility: CLINIC | Age: 37
End: 2022-07-20
Attending: FAMILY MEDICINE
Payer: COMMERCIAL

## 2022-07-20 DIAGNOSIS — M25.511 ACUTE PAIN OF RIGHT SHOULDER: ICD-10-CM

## 2022-07-20 PROCEDURE — 97110 THERAPEUTIC EXERCISES: CPT | Mod: GP | Performed by: PHYSICAL THERAPIST

## 2022-07-20 PROCEDURE — 97161 PT EVAL LOW COMPLEX 20 MIN: CPT | Mod: GP | Performed by: PHYSICAL THERAPIST

## 2022-07-20 NOTE — PROGRESS NOTES
Physical Therapy Initial Examination/Evaluation  July 20, 2022    Vikash Murillo is a 37 year old male referred to physical therapy by Pavithra Dey MD for treatment of     Diagnosis: R shoulder pain   Precautions/Restrictions/MD instructions/Other pertinent hx E&T    Therapist Impression:   Vikash presents with scapular dyskinesis and posterior shoulder tightness    GOALS: yoga, guitar    NEXT: Ball y    PTRX: handouts    Subjective:  DOI/onset: June 2022 DOS: none  Acute Injury or Gradual Onset?: Gradual injury over time  Mechanism of Injury: yoga push ups, yoga, guitar   Related PMH: Hx of shoulder pain, minor Previous Treatment: Rest, Ice and Heat Effect of prior treatment: good  Imaging: None  Chief Complaint/Functional Limitations:   Reaching OH, lifting guitar maplifer and see below in therapy evaluation codes   Pain: rest 0 /10, activity 6/10 Location: lateral Frequency: Intermittent Described as: dull and throbbing Alleviated by: Rest, Ice and Heat Progression of Symptoms: Gradually getting better. Time of day when pain is worse: Activity related  Sleeping: No issues/uninterrupted   Occupation: Music Therapist/Teacher  Job duties: keyboarding/computer use, repetitive tasks  Current HEP/exercise regimen: Yoga  Patient's goals are see chief complaints     Other pertinent PMH/Red Flags: None   Barriers at home/work: None as reported by patient  Pertinent Surgical History: None  Medications: None as reported by patient  General health as reported by patient: good  Return to MD:  Prn    SHOULDER EXAMINATION    STATIC POSTURE  Forward head: mild   Rounded shoulders:mild  Shoulder internally rotated: mild   Visual inspection: Inferior boarder prominence B and Medial boarder prominence B, protracted scapula R    DYNAMIC SCAPULAR TESTS  Dynamic Scapular Assessment: Poor eccentric control R>L    SHOULDER RANGE OF MOTION  WNL, painful arc    PROM Flexion Abd 90-90 ER 90-90 IR Scap Stabilized Horizontal Adduction    Left wnl na  90 35 neutral   Right wnl na 90 30 neutral   GH indicates pure glenohumeral ROM    SHOULDER STRENGTH  MMT Right Left   Flexion 5/5 5/5   Abduction 5/5 5/5   ER 5/5 5/5   IR 5/5 5/5   P= pain    Assessment/Plan:  Patient is a 37 year old male with right side shoulder complaints.    Patient has the following significant findings with corresponding treatment plan.                Diagnosis 1:  R shoulder pain  Pain -  hot/cold therapy, manual therapy, splint/taping/bracing/orthotics, self management, education and home program  Decreased ROM/flexibility - manual therapy and therapeutic exercise  Decreased strength - therapeutic exercise and therapeutic activities  Impaired muscle performance - neuro re-education  Impaired posture - neuro re-education    Therapy Evaluation Codes:   Cumulative Therapy Evaluation is: Low complexity.    Previous and current functional limitations:  (See Goal Flow Sheet for this information)    Short term and Long term goals: (See Goal Flow Sheet for this information)     Communication ability:  Patient appears to be able to clearly communicate and understand verbal and written communication and follow directions correctly.  Treatment Explanation - The following has been discussed with the patient:   RX ordered/plan of care  Anticipated outcomes  Possible risks and side effects  This patient would benefit from PT intervention to resume normal activities.   Rehab potential is good.    Frequency:  2 X a month, once daily  Duration:  for 2 months  Discharge Plan:  Achieve all LTG.  Independent in home treatment program.  Reach maximal therapeutic benefit.    Please refer to the daily flowsheet for treatment today, total treatment time and time spent performing 1:1 timed codes.

## 2022-07-22 NOTE — PROGRESS NOTES
[facility]    OUTPATIENT Physical Therapy ORTHOPEDIC EVALUATION  PLAN OF TREATMENT FOR OUTPATIENT REHABILITATION  (COMPLETE FOR INITIAL CLAIMS ONLY)  Patient's Last Name, First Name, M.I.  YOB: 1985  Vikash Murillo       Provider s Name:  [facility]   Medical Record No.  [id number]   Start of Care Date:      Onset Date:   06/01/22   Type:     _X__PT   ___OT Medical Diagnosis:    Encounter Diagnosis   Name Primary?    Acute pain of right shoulder         Treatment Diagnosis:  R shoulder pain        Goals:     07/20/22 0500   Body Part   Goals listed below are for R shoulder pain   Goal #1   Goal #1 reaching   Previous Functional Level No restrictions   Current Functional Level Can reach ;overhead   Performance level with pain   STG Target Performance Reach ;overhead   Performance level without brad   Goal #2   Goal #2 lifting/carrying   Previous Functional Level No restrictions   Current Functional Level Can lift   Performance level 10# 3/10 pain   LTG Target Performance Lift an item off floor to seat height weighing;Lift an item to counter height weighing   Performance Level 10# 0/10 pain   Rationale for grocery shopping;for meal preparation;for housework such as laundry, emptying garbage, use of ;for yard work such as shoveling snow;to perform job duties at work   Due date 09/14/22       Therapy Frequency:     Predicted Duration of Therapy Intervention:       Gasper Parsons, PT                 I CERTIFY THE NEED FOR THESE SERVICES FURNISHED UNDER        THIS PLAN OF TREATMENT AND WHILE UNDER MY CARE     (Physician attestation of this document indicates review and certification of the therapy plan).                     Certification Date From:      Certification Date To:       Referring Provider:  Pavithra Mejia*    Initial Assessment        See Epic  Evaluation                                                                      Xelmichaelz Pregnancy And Lactation Text: This medication is Pregnancy Category D and is not considered safe during pregnancy.  The risk during breast feeding is also uncertain.

## 2022-08-03 PROBLEM — M79.645 BILATERAL THUMB PAIN: Status: RESOLVED | Noted: 2022-02-04 | Resolved: 2022-08-03

## 2022-08-03 PROBLEM — M79.645 PAIN IN FINGER OF BOTH HANDS: Status: RESOLVED | Noted: 2022-02-04 | Resolved: 2022-08-03

## 2022-08-03 PROBLEM — M79.644 PAIN IN FINGER OF BOTH HANDS: Status: RESOLVED | Noted: 2022-02-04 | Resolved: 2022-08-03

## 2022-08-03 PROBLEM — M79.644 BILATERAL THUMB PAIN: Status: RESOLVED | Noted: 2022-02-04 | Resolved: 2022-08-03

## 2022-08-18 ENCOUNTER — VIRTUAL VISIT (OUTPATIENT)
Dept: PSYCHOLOGY | Facility: CLINIC | Age: 37
End: 2022-08-18
Attending: FAMILY MEDICINE
Payer: COMMERCIAL

## 2022-08-18 DIAGNOSIS — F41.1 GENERALIZED ANXIETY DISORDER: ICD-10-CM

## 2022-08-18 PROCEDURE — 90791 PSYCH DIAGNOSTIC EVALUATION: CPT | Mod: 95 | Performed by: COUNSELOR

## 2022-08-18 ASSESSMENT — ANXIETY QUESTIONNAIRES
6. BECOMING EASILY ANNOYED OR IRRITABLE: SEVERAL DAYS
GAD7 TOTAL SCORE: 9
4. TROUBLE RELAXING: MORE THAN HALF THE DAYS
IF YOU CHECKED OFF ANY PROBLEMS ON THIS QUESTIONNAIRE, HOW DIFFICULT HAVE THESE PROBLEMS MADE IT FOR YOU TO DO YOUR WORK, TAKE CARE OF THINGS AT HOME, OR GET ALONG WITH OTHER PEOPLE: SOMEWHAT DIFFICULT
GAD7 TOTAL SCORE: 9
3. WORRYING TOO MUCH ABOUT DIFFERENT THINGS: MORE THAN HALF THE DAYS
7. FEELING AFRAID AS IF SOMETHING AWFUL MIGHT HAPPEN: NOT AT ALL
8. IF YOU CHECKED OFF ANY PROBLEMS, HOW DIFFICULT HAVE THESE MADE IT FOR YOU TO DO YOUR WORK, TAKE CARE OF THINGS AT HOME, OR GET ALONG WITH OTHER PEOPLE?: SOMEWHAT DIFFICULT
5. BEING SO RESTLESS THAT IT IS HARD TO SIT STILL: NOT AT ALL
2. NOT BEING ABLE TO STOP OR CONTROL WORRYING: MORE THAN HALF THE DAYS
7. FEELING AFRAID AS IF SOMETHING AWFUL MIGHT HAPPEN: NOT AT ALL
1. FEELING NERVOUS, ANXIOUS, OR ON EDGE: MORE THAN HALF THE DAYS
GAD7 TOTAL SCORE: 9

## 2022-08-18 NOTE — PROGRESS NOTES
"Answers for HPI/ROS submitted by the patient on 2022  NICA 7 TOTAL SCORE: 9        [facility]  Provider Name:  Joyce Coronado     Credentials:  [facility]    PATIENT'S NAME: Vikash Murillo  PREFERRED NAME: Vikash  PRONOUNS:   ryan    MRN: [id number]  : 1985  ADDRESS: [address] [place] [address]  ACCT. NUMBER:  [id number]  DATE OF SERVICE: 22  START TIME: 3:00pm  END TIME: 4:00pm  PREFERRED PHONE: [phone]  May we leave a program related message: Yes  SERVICE MODALITY:  Video Visit:      Provider verified identity through the following two step process.  Patient provided:  Patient     Telemedicine Visit: The patient's condition can be safely assessed and treated via synchronous audio and visual telemedicine encounter.      Reason for Telemedicine Visit: Services only offered telehealth    Originating Site (Patient Location): Patient's home    Distant Site (Provider Location): Provider Remote Setting- Home Office    Consent:  The patient/guardian has verbally consented to: the potential risks and benefits of telemedicine (video visit) versus in person care; bill my insurance or make self-payment for services provided; and responsibility for payment of non-covered services.     Patient would like the video invitation sent by:  My Chart    Mode of Communication:  Video Conference via [employer]    As the provider I attest to compliance with applicable laws and regulations related to telemedicine.    UNIVERSAL ADULT Mental Health DIAGNOSTIC ASSESSMENT    Identifying Information:  Patient is a 37 year old,  individual.    Patient was referred for an assessment by self.  Patient attended the session alone.    Chief Complaint:   The reason for seeking services at this time is: \"Feeling overwhelmed and consistently down lately. Financial stress, existential sadness (not extreme), dissatisfaction with work. Hard to pinpoint when it began. Some of it many years ago.\"  The " "problem(s) began 3/3/2022.  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     Patient has attempted to resolve these concerns in the past through individual therapy.    Social/Family History:  Patient reported they grew up in [place], [place].  They were raised by biological parents  .  Parents  / - 4th grade. Client is the youngest of 3 siblings. He was always best buds with his dad and he got to see weekends with dad after that.  Patient reported that their childhood was \"pretty easy. The hardest part was moving into an apartment.\"  Patient described their current relationships with family of origin as: fine but his dad passed away when he was 22. Mom lives in [place], sister lives in [facility], and brother lives in [place]. Don't talk to sister anymore really. Talk to brother often.      The patient describes their cultural background as .  Cultural influences and impact on patient's life structure, values, norms, and healthcare: Grew up Jewish, never connected with it. I resonate more with eastern spiritual influences, to an extent. I meditate and practiced yoga regularly but that is unrelated to how I grew up..  Contextual influences on patient's health include: Family Factors family is spread out, living situation isn't what he wants it to be.    These factors will be addressed in the Preliminary Treatment plan. Patient identified their preferred language to be English. Patient reported they does not need the assistance of an  or other support involved in therapy.     Patient reported had no significant delays in developmental tasks.   Patient's highest education level was college graduate  .  Patient identified the following learning problems: none reported.  Modifications will not be used to assist communication in therapy.  Patient reports they are  able to understand written materials.    Patient reported the following relationship history: not " .  Patient's current relationship status is has a partner or significant other: currently not dating but dated someone for 3/4 years and are just friends.   Patient identified their sexual orientation as heterosexual.  Patient reported having  0 child(kwesi). Patient identified partner; mother; friends as part of their support system.  Patient identified the quality of these relationships as fair,       Patient's current living/housing situation involves staying in own home/apartment.  The immediate members of family and household include two housemates, living in a duplex and they report that housing is stable.    Patient is currently employed part time.  Patient reports their finances are obtained through employment. Patient does identify finances as a current stressor.      Patient reported that they have not been involved with the legal system.    Patient does not report being under probation/ parole/ jurisdiction. They are not under any current court jurisdiction. .    Patient's Strengths and Limitations:  Patient identified the following strengths or resources that will help them succeed in treatment: commitment to health and well being, exercise routine, friends / good social support, insight, intelligence, motivation, sense of humor and work ethic. Things that may interfere with the patient's success in treatment include: none identified.     Assessments:  The following assessments were completed by patient for this visit:  PHQ9:   PHQ-9 SCORE 3/31/2022   PHQ-9 Total Score MyChart 6 (Mild depression)   Some encounter information is confidential and restricted. Go to Review Flowsheets activity to see all data.     GAD7:   NICA-7 SCORE 3/31/2022 8/18/2022   Total Score 6 (mild anxiety) 9 (mild anxiety)   Total Score - 9   Some encounter information is confidential and restricted. Go to Review Flowsheets activity to see all data.     PROMIS 10-Global Health (all questions and answers displayed):   PROMIS 10  3/31/2022 8/18/2022   In general, would you say your health is: Good Very good   In general, would you say your quality of life is: Good Good   In general, how would you rate your physical health? Good Very good   In general, how would you rate your mental health, including your mood and your ability to think? Very good Very good   In general, how would you rate your satisfaction with your social activities and relationships? Good Good   In general, please rate how well you carry out your usual social activities and roles Very good Good   To what extent are you able to carry out your everyday physical activities such as walking, climbing stairs, carrying groceries, or moving a chair? Completely Completely   How often have you been bothered by emotional problems such as feeling anxious, depressed or irritable? Sometimes Sometimes   How would you rate your fatigue on average? Moderate Mild   How would you rate your pain on average?   0 = No Pain  to  10 = Worst Imaginable Pain 3 2   In general, would you say your health is: - 4   In general, would you say your quality of life is: - 3   In general, how would you rate your physical health? - 4   In general, how would you rate your mental health, including your mood and your ability to think? - 4   In general, how would you rate your satisfaction with your social activities and relationships? - 3   In general, please rate how well you carry out your usual social activities and roles. (This includes activities at home, at work and in your community, and responsibilities as a parent, child, spouse, employee, friend, etc.) - 3   To what extent are you able to carry out your everyday physical activities such as walking, climbing stairs, carrying groceries, or moving a chair? - 5   In the past 7 days, how often have you been bothered by emotional problems such as feeling anxious, depressed, or irritable? - 3   In the past 7 days, how would you rate your fatigue on average? -  2   In the past 7 days, how would you rate your pain on average, where 0 means no pain, and 10 means worst imaginable pain? - 2   Global Mental Health Score - 13   Global Physical Health Score - 17   PROMIS TOTAL - SUBSCORES - 30   Some encounter information is confidential and restricted. Go to Review Flowsheets activity to see all data.       Personal and Family Medical History:  Patient does not report a family history of mental health concerns.  Patient reports family history includes Arthritis in his mother; Diabetes in his father; Heart Disease in his maternal grandfather; Hyperlipidemia in his brother; Lung Cancer in his father.   Biological siblings might have some mental health issues.    Patient does report Mental Health Diagnosis and/or Treatment.  Patient Patient reported the following previous diagnoses which include(s): an Anxiety Disorder.  Patient reported symptoms began 3/2022.   Patient has received mental health services in the past: Behavioral Health Clinician.  Psychiatric Hospitalizations: None.  Patient denies a history of civil commitment.  Patient is not receiving other mental health services.  These include none.         Patient has had a physical exam to rule out medical causes for current symptoms.  Date of last physical exam was within the past year. Client was encouraged to follow up with PCP if symptoms were to develop. The patient has a [facility] Provider, who is named Nadine Dominique..  Patient reports no current medical concerns and no current dental concerns.  Patient denies any issues with pain..   There are not significant appetite / nutritional concerns / weight changes.   Patient does not report a history of head injury / trauma / cognitive impairment.      Patient reports not taking any current medications    Medication Adherence:  Patient reports not currently prescribed.      Patient Allergies:  No Known Allergies    Medical History:  No past medical  history on file.      Current Mental Status Exam:   Appearance:  Appropriate    Eye Contact:  Good   Psychomotor:  Normal       Gait / station:  no problem  Attitude / Demeanor: Cooperative  Friendly Pleasant  Speech      Rate / Production: Normal/ Responsive      Volume:  Normal  volume      Language:  intact and no problems  Mood:   Normal  Affect:   Appropriate    Thought Content: Clear   Thought Process: Coherent  Logical       Associations: No loosening of associations  Insight:   Good   Judgment:  Intact   Orientation:  All  Attention/concentration: Good      Substance Use:  Patient did report a family history of substance use concerns; see medical history section for details. Sister may have had some substance issues in the past. Patient has not received chemical dependency treatment in the past.  Patient has not ever been to detox.      Patient is not currently receiving any chemical dependency treatment.           Substance History of use Age of first use Date of last use     Pattern and duration of use (include amounts and frequency)   Alcohol never used       REPORTS SUBSTANCE USE: N/A   Cannabis   currently use high school 3/27/2022 REPORTS SUBSTANCE USE: reports using substance uses once a month, sometimes everyday, or a few times a week times per . and has 1 edible at a time.   Patient reports heaviest use is current use.     Amphetamines   never used     REPORTS SUBSTANCE USE: N/A   Cocaine/crack    never used       REPORTS SUBSTANCE USE: N/A   Hallucinogens never used         REPORTS SUBSTANCE USE: N/A   Inhalants never used         REPORTS SUBSTANCE USE: N/A   Heroin never used         REPORTS SUBSTANCE USE: N/A   Other Opiates never used     REPORTS SUBSTANCE USE: N/A   Benzodiazepine   never used     REPORTS SUBSTANCE USE: N/A   Barbiturates never used     REPORTS SUBSTANCE USE: N/A   Over the counter meds never used     REPORTS SUBSTANCE USE: N/A   Caffeine used in the past 5   REPORTS SUBSTANCE  USE: N/A   Nicotine  never used     REPORTS SUBSTANCE USE: N/A   Other substances not listed above:  Identify:  never used     REPORTS SUBSTANCE USE: N/A     Patient reported the following problems as a result of their substance use: no problems, not applicable.    Substance Use: No symptoms    Based on the negative CAGE score and clinical interview there  are not indications of drug or alcohol abuse.      Significant Losses / Trauma / Abuse / Neglect Issues:   Patient did not  serve in the .  There are indications or report of significant loss, trauma, abuse or neglect issues related to: death of dad due to lung cancer when client was 22.    At 21 he lost a best friend to cancer.   Earlier in 2022 had a friend complete suicide.  Concerns for possible neglect are not present.     Safety Assessment:   Patient denies current homicidal ideation and behaviors.  Patient denies current self-injurious ideation and behaviors.    Patient denied risk behaviors associated with substance use.  Patient denies any high risk behaviors associated with mental health symptoms.  Patient reports the following current concerns for their personal safety: None.  Patient reports there are not firearms in the house.       History of Safety Concerns:  Patient denied a history of homicidal ideation.     Patient denied a history of personal safety concerns.    Patient denied a history of assaultive behaviors.    Patient denied a history of sexual assault behaviors.     Patient denied a history of risk behaviors associated with substance use.  Patient denies any history of high risk behaviors associated with mental health symptoms.  Patient reports the following protective factors: forward or future oriented thinking; dedication to family or friends; regular sleep; regular physical activity; sense of belonging; purpose; sense of meaning; positive social skills; strong sense of self worth or esteem    Risk Plan:  See Recommendations for  Safety and Risk Management Plan    Review of Symptoms per patient report:  Depression: Lack of interest, Psychomotor slowing or agitation, Ruminations, Irritability, Withdrawn and Anger outbursts  Neisha:  No Symptoms  Psychosis: No Symptoms  Anxiety: Excessive worry, Nervousness, Physical complaints, such as headaches, stomachaches, muscle tension, Social anxiety, Sleep disturbance, Psychomotor agitation, Ruminations, Irritability and Anger outbursts  Panic:  No symptoms  Post Traumatic Stress Disorder:  No Symptoms   Eating Disorder: No Symptoms  ADD / ADHD:  No symptoms  Conduct Disorder: No symptoms  Autism Spectrum Disorder: No symptoms  Obsessive Compulsive Disorder: No Symptoms      Diagnostic Criteria:   Generalized Anxiety Disorder  A. Excessive anxiety and worry about a number of events or activities (such as work or school performance).   B. The person finds it difficult to control the worry.   - Restlessness or feeling keyed up or on edge.    - Difficulty concentrating or mind going blank.    - Irritability.    - Muscle tension.   D. The focus of the anxiety and worry is not confined to features of an Axis I disorder.  E. The anxiety, worry, or physical symptoms cause clinically significant distress or impairment in social, occupational, or other important areas of functioning.   F. The disturbance is not due to the direct physiological effects of a substance (e.g., a drug of abuse, a medication) or a general medical condition (e.g., hyperthyroidism) and does not occur exclusively during a Mood Disorder, a Psychotic Disorder, or a Pervasive Developmental Disorder.      Functional Status:  Patient reports the following functional impairments:  shutting down- not doing anything for about a week/not getting projects done.     Nonprogrammatic care:  Patient is requesting basic services to address current mental health concerns.    Clinical Summary:  1. Reason for assessment: anxiety.  2. Psychosocial,  Cultural and Contextual Factors: music therapist.  3. Principal DSM5 Diagnoses  (Sustained by DSM5 Criteria Listed Above):   300.02 (F41.1) Generalized Anxiety Disorder.  4. Other Diagnoses that is relevant to services:  none  5. Provisional Diagnosis: none  6. Prognosis: Expect Improvement and Relieve Acute Symptoms.  7. Likely consequences of symptoms if not treated: increased anxiety.  8. Client strengths include:  caring, creative, educated, empathetic, employed, goal-focused, good listener, has a previous history of therapy, insightful, intelligent, motivated, open to learning, open to suggestions / feedback, support of family, friends and providers, supportive, wants to learn, willing to ask questions, willing to relate to others and work history .     Recommendations:     1. Plan for Safety and Risk Management:   Safety and Risk: Recommended that patient call 911 or go to the local ED should there be a change in any of these risk factors..          Report to child / adult protection services was NA.     2. Patient's identified no cultural compenents relevant for therapy.     3. Initial Treatment will focus on:    Anxiety - processes immediate stressors.     4. Resources/Service Plan:    services are not indicated.   Modifications to assist communication are not indicated.   Additional disability accommodations are not indicated.      5. Collaboration:   Collaboration / coordination of treatment will be initiated with the following  support professionals: none needed at this time.      6.  Referrals:   The following referral(s) will be initiated: none at this time. Next Scheduled Appointment: 8/22.     A Release of Information has been obtained for the following: none .     Emergency Contact  was not obtained.     7. COURT:    COURT:  Discussed the general effects of drugs and alcohol on health and well-being.     8. Records:   These were reviewed at time of assessment.   Information in this assessment  was obtained from the medical record and  provided by patient who is a good historian.    Patient will have open access to their mental health medical record.        Provider Name/ Credentials:  Joyce Coronado MA,[facility]  August 18, 2022

## 2022-08-22 ENCOUNTER — VIRTUAL VISIT (OUTPATIENT)
Dept: PSYCHOLOGY | Facility: CLINIC | Age: 37
End: 2022-08-22
Payer: COMMERCIAL

## 2022-08-22 DIAGNOSIS — F41.1 GENERALIZED ANXIETY DISORDER: Primary | ICD-10-CM

## 2022-08-22 PROCEDURE — 90834 PSYTX W PT 45 MINUTES: CPT | Mod: 95 | Performed by: COUNSELOR

## 2022-08-22 NOTE — PROGRESS NOTES
[facility]                                     Progress Note    Patient Name: Vikash Murillo  Date: 8/22/22         Service Type: Individual      Session Start Time: 8:30am  Session End Time: 9:20am     Session Length: 50    Session #: 2    Attendees: Client    Service Modality:  Video Visit:      Provider verified identity through the following two step process.  Patient provided:  Patient is known previously to provider    Telemedicine Visit: The patient's condition can be safely assessed and treated via synchronous audio and visual telemedicine encounter.      Reason for Telemedicine Visit: Services only offered telehealth    Originating Site (Patient Location): Patient's home    Distant Site (Provider Location): Provider Remote Setting- Home Office    Consent:  The patient/guardian has verbally consented to: the potential risks and benefits of telemedicine (video visit) versus in person care; bill my insurance or make self-payment for services provided; and responsibility for payment of non-covered services.     Patient would like the video invitation sent by:  My Chart    Mode of Communication:  Video Conference via Amwell    As the provider I attest to compliance with applicable laws and regulations related to telemedicine.    DATA  Interactive Complexity: No  Crisis: No        Progress Since Last Session (Related to Symptoms / Goals / Homework):   Symptoms: No change .    Homework: Completed in session      Episode of Care Goals: Minimal progress - ACTION (Actively working towards change); Intervened by reinforcing change plan / affirming steps taken     Current / Ongoing Stressors and Concerns:   Wanting to change jobs      Treatment Objective(s) Addressed in This Session:   use distraction each time intrusive worry surfaces       Intervention:   Worked on the treatment plan today. Educated the client about the DBT mindfulness what and how skills    Assessments completed prior to  visit:  The following assessments were completed by patient for this visit:  PHQ9:   PHQ-9 SCORE 3/31/2022   PHQ-9 Total Score MyChart 6 (Mild depression)   Some encounter information is confidential and restricted. Go to Review Flowsheets activity to see all data.     GAD7:   NICA-7 SCORE 3/31/2022 8/18/2022   Total Score 6 (mild anxiety) 9 (mild anxiety)   Total Score - 9   Some encounter information is confidential and restricted. Go to Review Flowsheets activity to see all data.     PROMIS 10-Global Health (all questions and answers displayed):   PROMIS 10 3/31/2022 8/18/2022   In general, would you say your health is: Good Very good   In general, would you say your quality of life is: Good Good   In general, how would you rate your physical health? Good Very good   In general, how would you rate your mental health, including your mood and your ability to think? Very good Very good   In general, how would you rate your satisfaction with your social activities and relationships? Good Good   In general, please rate how well you carry out your usual social activities and roles Very good Good   To what extent are you able to carry out your everyday physical activities such as walking, climbing stairs, carrying groceries, or moving a chair? Completely Completely   How often have you been bothered by emotional problems such as feeling anxious, depressed or irritable? Sometimes Sometimes   How would you rate your fatigue on average? Moderate Mild   How would you rate your pain on average?   0 = No Pain  to  10 = Worst Imaginable Pain 3 2   In general, would you say your health is: - 4   In general, would you say your quality of life is: - 3   In general, how would you rate your physical health? - 4   In general, how would you rate your mental health, including your mood and your ability to think? - 4   In general, how would you rate your satisfaction with your social activities and relationships? - 3   In general,  please rate how well you carry out your usual social activities and roles. (This includes activities at home, at work and in your community, and responsibilities as a parent, child, spouse, employee, friend, etc.) - 3   To what extent are you able to carry out your everyday physical activities such as walking, climbing stairs, carrying groceries, or moving a chair? - 5   In the past 7 days, how often have you been bothered by emotional problems such as feeling anxious, depressed, or irritable? - 3   In the past 7 days, how would you rate your fatigue on average? - 2   In the past 7 days, how would you rate your pain on average, where 0 means no pain, and 10 means worst imaginable pain? - 2   Global Mental Health Score - 13   Global Physical Health Score - 17   PROMIS TOTAL - SUBSCORES - 30   Some encounter information is confidential and restricted. Go to Review Flowsheets activity to see all data.         ASSESSMENT: Current Emotional / Mental Status (status of significant symptoms):   Risk status (Self / Other harm or suicidal ideation)   Patient denies current fears or concerns for personal safety.   Patient denies current or recent suicidal ideation or behaviors.   Patient denies current or recent homicidal ideation or behaviors.   Patient denies current or recent self injurious behavior or ideation.   Patient denies other safety concerns.   Patient reports there has been no change in risk factors since their last session.     Patient reports there has been no change in protective factors since their last session.     Recommended that patient call 911 or go to the local ED should there be a change in any of these risk factors.     Appearance:   Appropriate    Eye Contact:   Good    Psychomotor Behavior: Normal    Attitude:   Cooperative    Orientation:   All   Speech    Rate / Production: Normal/ Responsive    Volume:  Normal    Mood:    Normal   Affect:    Appropriate    Thought Content:  Clear    Thought  Form:  Coherent  Logical    Insight:    Good      Medication Review:   No current psychiatric medications prescribed     Medication Compliance:   NA     Changes in Health Issues:   None reported     Chemical Use Review:   Substance Use: Chemical use reviewed, no active concerns identified      Tobacco Use: No current tobacco use.      Diagnosis:  1. Generalized anxiety disorder        Collateral Reports Completed:   Not Applicable    PLAN: (Patient Tasks / Therapist Tasks / Other)  Practice what and how skills        Joyce , [facility]                                                         ______________________________________________________________________    Individual Treatment Plan    Patient's Name: Vikash Murillo  YOB: 1985    Date of Creation: 8/22/22  Date Treatment Plan Last Reviewed/Revised: 8/22/22    DSM5 Diagnoses: 300.02 (F41.1) Generalized Anxiety Disorder  Psychosocial / Contextual Factors:   PROMIS (reviewed every 90 days):     Referral / Collaboration:  Referral to another professional/service is not indicated at this time..    Anticipated number of session for this episode of care: 6-9 sessions  Anticipation frequency of session: Every other week  Anticipated Duration of each session: 38-52 minutes  Treatment plan will be reviewed in 90 days or when goals have been changed.       MeasurableTreatment Goal(s) related to diagnosis / functional impairment(s)  Goal 1: Patient will feel confident in his ability to quit his job through increasing assertiveness skills and decreasing social anxiety    I will know I've met my goal when I've quit my job.      Objective #A (Patient Action)    Patient will identify two areas of life that you would like to have improved functioning.  Status: New - Date: 8/22/22     Intervention(s)  Therapist will teach emotional regulation skills. distress tolerance skills, interpersonal effectiveness skills, emotion regluation skills, mindfulness skills,  radical acceptance. Therapist will teach client how to ID body cues for anxiety, anxiety reduction techniques, how to ID triggers for depression and anxiety- decrease reactivity/eliminate, lifestyle changes to reduce depression and anxiety, communication skills, explore cognitive beliefs and help client to develop healthy cognitive patterns and beliefs.    Objective #B  Patient will increase assetiveness skills on a daily basis and staying firm in their decisions  Status: New - Date: 8/22/22     Intervention(s)  Therapist will teach emotional regulation skills. distress tolerance skills, interpersonal effectiveness skills, emotion regluation skills, mindfulness skills, radical acceptance. Therapist will teach client how to ID body cues for anxiety, anxiety reduction techniques, how to ID triggers for depression and anxiety- decrease reactivity/eliminate, lifestyle changes to reduce depression and anxiety, communication skills, explore cognitive beliefs and help client to develop healthy cognitive patterns and beliefs.        Goal 2: Patient will increase anxiety reduction skills      Objective #A (Patient Action)    Status: New - Date: 8/22/22     Patient will learn and implement physical exercises to help reduce physical symptoms of anxiety .    Intervention(s)  Therapist will teach somatic experiences for stress reduction/DBT distress tolerance skills.      Patient has reviewed and agreed to the above plan.      Joyce Coronado, [facility]  August 22, 2022

## 2022-09-01 ENCOUNTER — VIRTUAL VISIT (OUTPATIENT)
Dept: PSYCHOLOGY | Facility: CLINIC | Age: 37
End: 2022-09-01
Payer: COMMERCIAL

## 2022-09-01 DIAGNOSIS — F41.1 GENERALIZED ANXIETY DISORDER: Primary | ICD-10-CM

## 2022-09-01 PROCEDURE — 90834 PSYTX W PT 45 MINUTES: CPT | Mod: 95 | Performed by: COUNSELOR

## 2022-09-01 NOTE — PROGRESS NOTES
[facility]                                     Progress Note    Patient Name: Vikash Murillo  Date: 9/1/22         Service Type: Individual      Session Start Time: 2:05pm  Session End Time: 2:55pm     Session Length: 50    Session #: 3    Attendees: Client    Service Modality:  Video Visit:      Provider verified identity through the following two step process.  Patient provided:  Patient is known previously to provider    Telemedicine Visit: The patient's condition can be safely assessed and treated via synchronous audio and visual telemedicine encounter.      Reason for Telemedicine Visit: Services only offered telehealth    Originating Site (Patient Location): Patient's home    Distant Site (Provider Location): Provider Remote Setting- Home Office    Consent:  The patient/guardian has verbally consented to: the potential risks and benefits of telemedicine (video visit) versus in person care; bill my insurance or make self-payment for services provided; and responsibility for payment of non-covered services.     Patient would like the video invitation sent by:  My Chart    Mode of Communication:  Video Conference via Amwell    As the provider I attest to compliance with applicable laws and regulations related to telemedicine.    DATA  Interactive Complexity: No  Crisis: No        Progress Since Last Session (Related to Symptoms / Goals / Homework):   Symptoms: Improving .    Homework: Completed in session      Episode of Care Goals: Minimal progress - ACTION (Actively working towards change); Intervened by reinforcing change plan / affirming steps taken     Current / Ongoing Stressors and Concerns:   The client stated he told his supervisor that he is wanting to quit his job and that it will be happening.    Siblings- sister diagnosed with bipolar disorder. Don't talk often at all.       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 The client stated he might be seeing his siblings sometime soon and he wanted to discuss his boundaries with his sister. He stated she has a lot of mental health problems and he doesn't know what his role is in helping her. She lives in [place] and their mom lives in [place], therefore mom is the closest. His brother lives in [place] and doesn't come home often. He feels and obligation to help. Talked about what he is comfortable with when it comes to helping familly. Discussed what he isn't responsible for as well and that he might be trying to take on something he doesn't need to.     Assessments completed prior to visit:  The following assessments were completed by patient for this visit:  PHQ9:   PHQ-9 SCORE 3/31/2022   PHQ-9 Total Score MyChart 6 (Mild depression)   Some encounter information is confidential and restricted. Go to Review Flowsheets activity to see all data.     GAD7:   NICA-7 SCORE 3/31/2022 8/18/2022   Total Score 6 (mild anxiety) 9 (mild anxiety)   Total Score - 9   Some encounter information is confidential and restricted. Go to Review Flowsheets activity to see all data.     PROMIS 10-Global Health (all questions and answers displayed):   PROMIS 10 3/31/2022 8/18/2022   In general, would you say your health is: Good Very good   In general, would you say your quality of life is: Good Good   In general, how would you rate your physical health? Good Very good   In general, how would you rate your mental health, including your mood and your ability to think? Very good Very good   In general, how would you rate your satisfaction with your social activities and relationships? Good Good   In general, please rate how well you carry out your usual social activities and roles Very good Good   To what extent are you able to carry out your everyday physical activities such as walking,  climbing stairs, carrying groceries, or moving a chair? Completely Completely   How often have you been bothered by emotional problems such as feeling anxious, depressed or irritable? Sometimes Sometimes   How would you rate your fatigue on average? Moderate Mild   How would you rate your pain on average?   0 = No Pain  to  10 = Worst Imaginable Pain 3 2   In general, would you say your health is: - 4   In general, would you say your quality of life is: - 3   In general, how would you rate your physical health? - 4   In general, how would you rate your mental health, including your mood and your ability to think? - 4   In general, how would you rate your satisfaction with your social activities and relationships? - 3   In general, please rate how well you carry out your usual social activities and roles. (This includes activities at home, at work and in your community, and responsibilities as a parent, child, spouse, employee, friend, etc.) - 3   To what extent are you able to carry out your everyday physical activities such as walking, climbing stairs, carrying groceries, or moving a chair? - 5   In the past 7 days, how often have you been bothered by emotional problems such as feeling anxious, depressed, or irritable? - 3   In the past 7 days, how would you rate your fatigue on average? - 2   In the past 7 days, how would you rate your pain on average, where 0 means no pain, and 10 means worst imaginable pain? - 2   Global Mental Health Score - 13   Global Physical Health Score - 17   PROMIS TOTAL - SUBSCORES - 30   Some encounter information is confidential and restricted. Go to Review Flowsheets activity to see all data.         ASSESSMENT: Current Emotional / Mental Status (status of significant symptoms):   Risk status (Self / Other harm or suicidal ideation)   Patient denies current fears or concerns for personal safety.   Patient denies current or recent suicidal ideation or behaviors.   Patient denies  current or recent homicidal ideation or behaviors.   Patient denies current or recent self injurious behavior or ideation.   Patient denies other safety concerns.   Patient reports there has been no change in risk factors since their last session.     Patient reports there has been no change in protective factors since their last session.     Recommended that patient call 911 or go to the local ED should there be a change in any of these risk factors.     Appearance:   Appropriate    Eye Contact:   Good    Psychomotor Behavior: Normal    Attitude:   Cooperative    Orientation:   All   Speech    Rate / Production: Normal/ Responsive    Volume:  Normal    Mood:    Normal   Affect:    Appropriate    Thought Content:  Clear    Thought Form:  Coherent  Logical    Insight:    Good      Medication Review:   No current psychiatric medications prescribed     Medication Compliance:   NA     Changes in Health Issues:   None reported     Chemical Use Review:   Substance Use: Chemical use reviewed, no active concerns identified      Tobacco Use: No current tobacco use.      Diagnosis:  1. Generalized anxiety disorder        Collateral Reports Completed:   Not Applicable    PLAN: (Patient Tasks / Therapist Tasks / Other)  Practice what and how skills        WALDEMAR Alexander                                                         ______________________________________________________________________    Individual Treatment Plan    Patient's Name: Vikash Murillo  YOB: 1985    Date of Creation: 8/22/22  Date Treatment Plan Last Reviewed/Revised: 8/22/22    DSM5 Diagnoses: 300.02 (F41.1) Generalized Anxiety Disorder  Psychosocial / Contextual Factors:   PROMIS (reviewed every 90 days):     Referral / Collaboration:  Referral to another professional/service is not indicated at this time..    Anticipated number of session for this episode of care: 6-9 sessions  Anticipation frequency of session: Every other  week  Anticipated Duration of each session: 38-52 minutes  Treatment plan will be reviewed in 90 days or when goals have been changed.       MeasurableTreatment Goal(s) related to diagnosis / functional impairment(s)  Goal 1: Patient will feel confident in his ability to quit his job through increasing assertiveness skills and decreasing social anxiety    I will know I've met my goal when I've quit my job.      Objective #A (Patient Action)    Patient will identify two areas of life that you would like to have improved functioning.  Status: New - Date: 8/22/22     Intervention(s)  Therapist will teach emotional regulation skills. distress tolerance skills, interpersonal effectiveness skills, emotion regluation skills, mindfulness skills, radical acceptance. Therapist will teach client how to ID body cues for anxiety, anxiety reduction techniques, how to ID triggers for depression and anxiety- decrease reactivity/eliminate, lifestyle changes to reduce depression and anxiety, communication skills, explore cognitive beliefs and help client to develop healthy cognitive patterns and beliefs.    Objective #B  Patient will increase assetiveness skills on a daily basis and staying firm in their decisions  Status: New - Date: 8/22/22     Intervention(s)  Therapist will teach emotional regulation skills. distress tolerance skills, interpersonal effectiveness skills, emotion regluation skills, mindfulness skills, radical acceptance. Therapist will teach client how to ID body cues for anxiety, anxiety reduction techniques, how to ID triggers for depression and anxiety- decrease reactivity/eliminate, lifestyle changes to reduce depression and anxiety, communication skills, explore cognitive beliefs and help client to develop healthy cognitive patterns and beliefs.        Goal 2: Patient will increase anxiety reduction skills      Objective #A (Patient Action)    Status: New - Date: 8/22/22     Patient will learn and implement  physical exercises to help reduce physical symptoms of anxiety .    Intervention(s)  Therapist will teach somatic experiences for stress reduction/DBT distress tolerance skills.      Patient has reviewed and agreed to the above plan.      Joyce Coronado [facility]  August 22, 2022

## 2022-09-08 ENCOUNTER — VIRTUAL VISIT (OUTPATIENT)
Dept: PSYCHOLOGY | Facility: CLINIC | Age: 37
End: 2022-09-08
Payer: COMMERCIAL

## 2022-09-08 DIAGNOSIS — F41.1 GENERALIZED ANXIETY DISORDER: Primary | ICD-10-CM

## 2022-09-08 PROCEDURE — 90834 PSYTX W PT 45 MINUTES: CPT | Mod: 95 | Performed by: COUNSELOR

## 2022-09-08 NOTE — PROGRESS NOTES
[facility]                                     Progress Note    Patient Name: Vikash Murillo  Date: 9/8/22         Service Type: Individual      Session Start Time: 8:03am  Session End Time: 8:55am     Session Length: 52    Session #: 4    Attendees: Client    Service Modality:  Video Visit:      Provider verified identity through the following two step process.  Patient provided:  Patient is known previously to provider    Telemedicine Visit: The patient's condition can be safely assessed and treated via synchronous audio and visual telemedicine encounter.      Reason for Telemedicine Visit: Services only offered telehealth    Originating Site (Patient Location): Patient's home    Distant Site (Provider Location): Provider Remote Setting- Home Office    Consent:  The patient/guardian has verbally consented to: the potential risks and benefits of telemedicine (video visit) versus in person care; bill my insurance or make self-payment for services provided; and responsibility for payment of non-covered services.     Patient would like the video invitation sent by:  My Chart    Mode of Communication:  Video Conference via Amwell    As the provider I attest to compliance with applicable laws and regulations related to telemedicine.    DATA  Interactive Complexity: No  Crisis: No        Progress Since Last Session (Related to Symptoms / Goals / Homework):   Symptoms: Improving .    Homework: Completed in session      Episode of Care Goals: Minimal progress - ACTION (Actively working towards change); Intervened by reinforcing change plan / affirming steps taken     Current / Ongoing Stressors and Concerns:   The client is driving to [place] tomorrow to see family.   The client has been struggling with anxiety around being assertive with clients.       Treatment Objective(s) Addressed in This Session:   use distraction each time intrusive worry surfaces       Intervention:   Worked on assertive  communication and how to practice this. Also talked about his concerns around some family matters and how he can be helpfulyet still have boundaries.     Assessments completed prior to visit:  The following assessments were completed by patient for this visit:  PHQ9:   PHQ-9 SCORE 3/31/2022   PHQ-9 Total Score MyChart 6 (Mild depression)   Some encounter information is confidential and restricted. Go to Review Flowsheets activity to see all data.     GAD7:   NICA-7 SCORE 3/31/2022 8/18/2022   Total Score 6 (mild anxiety) 9 (mild anxiety)   Total Score - 9   Some encounter information is confidential and restricted. Go to Review Flowsheets activity to see all data.     PROMIS 10-Global Health (all questions and answers displayed):   PROMIS 10 3/31/2022 8/18/2022   In general, would you say your health is: Good Very good   In general, would you say your quality of life is: Good Good   In general, how would you rate your physical health? Good Very good   In general, how would you rate your mental health, including your mood and your ability to think? Very good Very good   In general, how would you rate your satisfaction with your social activities and relationships? Good Good   In general, please rate how well you carry out your usual social activities and roles Very good Good   To what extent are you able to carry out your everyday physical activities such as walking, climbing stairs, carrying groceries, or moving a chair? Completely Completely   How often have you been bothered by emotional problems such as feeling anxious, depressed or irritable? Sometimes Sometimes   How would you rate your fatigue on average? Moderate Mild   How would you rate your pain on average?   0 = No Pain  to  10 = Worst Imaginable Pain 3 2   In general, would you say your health is: - 4   In general, would you say your quality of life is: - 3   In general, how would you rate your physical health? - 4   In general, how would you rate your  mental health, including your mood and your ability to think? - 4   In general, how would you rate your satisfaction with your social activities and relationships? - 3   In general, please rate how well you carry out your usual social activities and roles. (This includes activities at home, at work and in your community, and responsibilities as a parent, child, spouse, employee, friend, etc.) - 3   To what extent are you able to carry out your everyday physical activities such as walking, climbing stairs, carrying groceries, or moving a chair? - 5   In the past 7 days, how often have you been bothered by emotional problems such as feeling anxious, depressed, or irritable? - 3   In the past 7 days, how would you rate your fatigue on average? - 2   In the past 7 days, how would you rate your pain on average, where 0 means no pain, and 10 means worst imaginable pain? - 2   Global Mental Health Score - 13   Global Physical Health Score - 17   PROMIS TOTAL - SUBSCORES - 30   Some encounter information is confidential and restricted. Go to Review Flowsheets activity to see all data.         ASSESSMENT: Current Emotional / Mental Status (status of significant symptoms):   Risk status (Self / Other harm or suicidal ideation)   Patient denies current fears or concerns for personal safety.   Patient denies current or recent suicidal ideation or behaviors.   Patient denies current or recent homicidal ideation or behaviors.   Patient denies current or recent self injurious behavior or ideation.   Patient denies other safety concerns.   Patient reports there has been no change in risk factors since their last session.     Patient reports there has been no change in protective factors since their last session.     Recommended that patient call 911 or go to the local ED should there be a change in any of these risk factors.     Appearance:   Appropriate    Eye Contact:   Good    Psychomotor Behavior: Normal     Attitude:   Cooperative    Orientation:   All   Speech    Rate / Production: Normal/ Responsive    Volume:  Normal    Mood:    Normal   Affect:    Appropriate    Thought Content:  Clear    Thought Form:  Coherent  Logical    Insight:    Good      Medication Review:   No current psychiatric medications prescribed     Medication Compliance:   NA     Changes in Health Issues:   None reported     Chemical Use Review:   Substance Use: Chemical use reviewed, no active concerns identified      Tobacco Use: No current tobacco use.      Diagnosis:  1. Generalized anxiety disorder        Collateral Reports Completed:   Not Applicable    PLAN: (Patient Tasks / Therapist Tasks / Other)  Client will practice communication skills and will schedule appointments as needed.        Joyce Coronado [facility]                                                         ______________________________________________________________________    Individual Treatment Plan    Patient's Name: Vikash Murillo  YOB: 1985    Date of Creation: 8/22/22  Date Treatment Plan Last Reviewed/Revised: 8/22/22    DSM5 Diagnoses: 300.02 (F41.1) Generalized Anxiety Disorder  Psychosocial / Contextual Factors:   PROMIS (reviewed every 90 days):     Referral / Collaboration:  Referral to another professional/service is not indicated at this time..    Anticipated number of session for this episode of care: 6-9 sessions  Anticipation frequency of session: Every other week  Anticipated Duration of each session: 38-52 minutes  Treatment plan will be reviewed in 90 days or when goals have been changed.       MeasurableTreatment Goal(s) related to diagnosis / functional impairment(s)  Goal 1: Patient will feel confident in his ability to quit his job through increasing assertiveness skills and decreasing social anxiety    I will know I've met my goal when I've quit my job.      Objective #A (Patient Action)    Patient will identify two areas of life that you  would like to have improved functioning.  Status: New - Date: 8/22/22     Intervention(s)  Therapist will teach emotional regulation skills. distress tolerance skills, interpersonal effectiveness skills, emotion regluation skills, mindfulness skills, radical acceptance. Therapist will teach client how to ID body cues for anxiety, anxiety reduction techniques, how to ID triggers for depression and anxiety- decrease reactivity/eliminate, lifestyle changes to reduce depression and anxiety, communication skills, explore cognitive beliefs and help client to develop healthy cognitive patterns and beliefs.    Objective #B  Patient will increase assetiveness skills on a daily basis and staying firm in their decisions  Status: New - Date: 8/22/22     Intervention(s)  Therapist will teach emotional regulation skills. distress tolerance skills, interpersonal effectiveness skills, emotion regluation skills, mindfulness skills, radical acceptance. Therapist will teach client how to ID body cues for anxiety, anxiety reduction techniques, how to ID triggers for depression and anxiety- decrease reactivity/eliminate, lifestyle changes to reduce depression and anxiety, communication skills, explore cognitive beliefs and help client to develop healthy cognitive patterns and beliefs.        Goal 2: Patient will increase anxiety reduction skills      Objective #A (Patient Action)    Status: New - Date: 8/22/22     Patient will learn and implement physical exercises to help reduce physical symptoms of anxiety .    Intervention(s)  Therapist will teach somatic experiences for stress reduction/DBT distress tolerance skills.      Patient has reviewed and agreed to the above plan.      Joyce Coronado [facility]  August 22, 2022

## 2022-09-24 ENCOUNTER — HEALTH MAINTENANCE LETTER (OUTPATIENT)
Age: 37
End: 2022-09-24

## 2023-04-28 PROBLEM — M25.511 ACUTE PAIN OF RIGHT SHOULDER: Status: RESOLVED | Noted: 2022-07-20 | Resolved: 2023-04-28

## 2023-05-08 ENCOUNTER — HEALTH MAINTENANCE LETTER (OUTPATIENT)
Age: 38
End: 2023-05-08

## 2023-08-14 ENCOUNTER — OFFICE VISIT (OUTPATIENT)
Dept: FAMILY MEDICINE | Facility: CLINIC | Age: 38
End: 2023-08-14
Payer: COMMERCIAL

## 2023-08-14 VITALS
DIASTOLIC BLOOD PRESSURE: 65 MMHG | WEIGHT: 118.9 LBS | SYSTOLIC BLOOD PRESSURE: 95 MMHG | RESPIRATION RATE: 18 BRPM | OXYGEN SATURATION: 98 % | BODY MASS INDEX: 18.08 KG/M2 | HEART RATE: 70 BPM

## 2023-08-14 DIAGNOSIS — G89.29 CHRONIC PAIN OF RIGHT KNEE: ICD-10-CM

## 2023-08-14 DIAGNOSIS — M25.561 CHRONIC PAIN OF RIGHT KNEE: ICD-10-CM

## 2023-08-14 DIAGNOSIS — M19.019 ARTHROPATHY OF SHOULDER REGION: ICD-10-CM

## 2023-08-14 DIAGNOSIS — Z11.4 SCREENING FOR HIV (HUMAN IMMUNODEFICIENCY VIRUS): ICD-10-CM

## 2023-08-14 DIAGNOSIS — Z00.00 ANNUAL PHYSICAL EXAM: Primary | ICD-10-CM

## 2023-08-14 DIAGNOSIS — Z11.59 NEED FOR HEPATITIS C SCREENING TEST: ICD-10-CM

## 2023-08-14 LAB
ALBUMIN SERPL BCG-MCNC: 4.7 G/DL (ref 3.5–5.2)
ALP SERPL-CCNC: 55 U/L (ref 40–129)
ALT SERPL W P-5'-P-CCNC: 26 U/L (ref 0–70)
ANION GAP SERPL CALCULATED.3IONS-SCNC: 12 MMOL/L (ref 7–15)
AST SERPL W P-5'-P-CCNC: 28 U/L (ref 0–45)
BILIRUB SERPL-MCNC: 0.5 MG/DL
BUN SERPL-MCNC: 10.7 MG/DL (ref 6–20)
CALCIUM SERPL-MCNC: 9.5 MG/DL (ref 8.6–10)
CHLORIDE SERPL-SCNC: 101 MMOL/L (ref 98–107)
CREAT SERPL-MCNC: 0.79 MG/DL (ref 0.67–1.17)
DEPRECATED HCO3 PLAS-SCNC: 26 MMOL/L (ref 22–29)
ERYTHROCYTE [DISTWIDTH] IN BLOOD BY AUTOMATED COUNT: 12.1 % (ref 10–15)
GFR SERPL CREATININE-BSD FRML MDRD: >90 ML/MIN/1.73M2
GLUCOSE SERPL-MCNC: 83 MG/DL (ref 70–99)
HCT VFR BLD AUTO: 39.7 % (ref 40–53)
HGB BLD-MCNC: 13.5 G/DL (ref 13.3–17.7)
HOLD SPECIMEN: NORMAL
MCH RBC QN AUTO: 31.3 PG (ref 26.5–33)
MCHC RBC AUTO-ENTMCNC: 34 G/DL (ref 31.5–36.5)
MCV RBC AUTO: 92 FL (ref 78–100)
PLATELET # BLD AUTO: 228 10E3/UL (ref 150–450)
POTASSIUM SERPL-SCNC: 4.4 MMOL/L (ref 3.4–5.3)
PROT SERPL-MCNC: 7 G/DL (ref 6.4–8.3)
RBC # BLD AUTO: 4.32 10E6/UL (ref 4.4–5.9)
SODIUM SERPL-SCNC: 139 MMOL/L (ref 136–145)
WBC # BLD AUTO: 4.7 10E3/UL (ref 4–11)

## 2023-08-14 PROCEDURE — 36415 COLL VENOUS BLD VENIPUNCTURE: CPT

## 2023-08-14 PROCEDURE — 99214 OFFICE O/P EST MOD 30 MIN: CPT | Mod: 25

## 2023-08-14 PROCEDURE — 99395 PREV VISIT EST AGE 18-39: CPT | Mod: GC

## 2023-08-14 PROCEDURE — 86803 HEPATITIS C AB TEST: CPT

## 2023-08-14 PROCEDURE — 80053 COMPREHEN METABOLIC PANEL: CPT

## 2023-08-14 PROCEDURE — 85027 COMPLETE CBC AUTOMATED: CPT

## 2023-08-14 PROCEDURE — 87389 HIV-1 AG W/HIV-1&-2 AB AG IA: CPT

## 2023-08-14 NOTE — PROGRESS NOTES
Assessment & Plan     Annual physical exam  Vikash does not have baseline labs. He notes that he historically heals very slowly and experiences a great deal of joint injuries. Wanted to assess for vitamin deficiencies, as he is vegetarian and notes that his injuries heal faster when he drinks chicken broth; however, he is not sure if his insurance would cover it, so I got preliminary labs instead.  - CBC with Platelets and Reflex to Iron Studies; Future  - Comprehensive metabolic panel; Future  - CBC with Platelets and Reflex to Iron Studies  - Comprehensive metabolic panel    Arthropathy of shoulder region  Long-standing pain with shoulder abduction and external rotation. X-ray revealed moderate acromioclavicular joint degenerative change. Focal sclerosis of the medial posterior humeral head neck junction, technically nonspecific but presumably representing a bone island. Physical therapy will be helpful in strengthening the muscles around his shoulder.  - Physical Therapy Referral; Future  - XR Shoulder Right G/E 3 Views; Future    Chronic pain of right knee  Worsening diffuse knee pain with gradual onset in the last 2 months. Pain is induced by external rotation of the knee when sitting cross-legged. X-ray of the knee revealed no acute osseous abnormality, but there was apparent moderate lateral compartment joint space loss. Physical therapy is also recommended to strengthening the surrounding muscles of the knee.  - XR Knee Right 1/2 Views; Future  - Physical Therapy Referral; Future    Screening for HIV (human immunodeficiency virus)  Recommended per guidelines.  - HIV Screening; Future  - HIV Screening    Need for hepatitis C screening test  Recommended per guidelines.  - Hepatitis C Screen Reflex to HCV RNA Quant and Genotype; Future  - Hepatitis C Screen Reflex to HCV RNA Quant and Genotype      Return in about 6 months (around 2/14/2024).    Nasreen Carrasquillo DO, MPH  [facility]  "LUIS ENRIQUE Suh is a 38 year old, presenting for the following health issues:  Pain (Pt here for knee and shoulder pain)        8/14/2023    10:50 AM   Additional Questions   Roomed by Doua   Accompanied by Self         8/14/2023    10:50 AM   Patient Reported Additional Medications   Patient reports taking the following new medications n/a       HPI     Right shoulder pain   Pain with shoulder abduction and external rotation  Pain induced with over-head shoulder press + extended position playing guitar + unsupported arm while using computer at desk + driving  Feels similar to pain last time - PT helped but wasn't sure if he needed to come back for another referral  Has not tried any pain relief medication as the pain only occurs with specific movements  Endorses pain on lateral upper arm   Describes the pain as dull, throbbing, and shooting   Pain radiates into neck  Denies any loss of sensation in right arm and hand  When pain has occurred from exercise, pt reports not exercising that area to let it rest - however his daily activities of playing guitar, using computer, tuning pianos, and driving are unavoidable    Pt endorses he often has to turn head to finish swallowing - has had this since high school - \"feels like a twitching, stinging nerve pain in neck\"    Right knee Pain  Has noticed diffuse knee pain with gradual onset in the last 2 months  Pain is induced by external rotation of the knee when sitting cross-legged   No pain when running  Pt endorses history of ankle weakness that may have lead to current knee pain  Pt reports pain is getting worse    Other Concerns:  Questions on vaccination record - ok with getting any necessary except for COVID (side effects)    Review of Systems   Constitutional, HEENT, cardiovascular, pulmonary, gi and gu systems are negative, except as otherwise noted.      Objective    BP 95/65 (BP Location: Left arm, Patient Position: Sitting, Cuff Size: Adult Regular)  "  Pulse 70   Resp 18   Wt 53.9 kg (118 lb 14.4 oz)   SpO2 98%   BMI 18.08 kg/m    Body mass index is 18.08 kg/m .  Physical Exam  Vitals reviewed.   Constitutional:       Appearance: Normal appearance.   HENT:      Head: Normocephalic and atraumatic.      Nose: Nose normal.      Mouth/Throat:      Mouth: Mucous membranes are moist.      Pharynx: Oropharynx is clear.   Eyes:      Extraocular Movements: Extraocular movements intact.      Conjunctiva/sclera: Conjunctivae normal.   Musculoskeletal:      Right shoulder: Normal range of motion. Normal strength.      Left shoulder: Normal.      Right knee: No tenderness.      Instability Tests: Medial Mahi test negative and lateral Mahi test negative.      Left knee: Normal.      Comments: Positive Hawkin's test on right shoulder.    Neurological:      Mental Status: He is alert.        ----- Service Performed and Documented by Resident or Fellow ------

## 2023-08-15 LAB
HCV AB SERPL QL IA: NONREACTIVE
HIV 1+2 AB+HIV1 P24 AG SERPL QL IA: NONREACTIVE

## 2023-08-16 ENCOUNTER — THERAPY VISIT (OUTPATIENT)
Dept: PHYSICAL THERAPY | Facility: CLINIC | Age: 38
End: 2023-08-16
Attending: FAMILY MEDICINE
Payer: COMMERCIAL

## 2023-08-16 DIAGNOSIS — M19.019 ARTHROPATHY OF SHOULDER REGION: ICD-10-CM

## 2023-08-16 PROCEDURE — 97161 PT EVAL LOW COMPLEX 20 MIN: CPT | Mod: GP | Performed by: PHYSICAL THERAPIST

## 2023-08-16 PROCEDURE — 97112 NEUROMUSCULAR REEDUCATION: CPT | Mod: GP | Performed by: PHYSICAL THERAPIST

## 2023-08-16 PROCEDURE — 97140 MANUAL THERAPY 1/> REGIONS: CPT | Mod: GP | Performed by: PHYSICAL THERAPIST

## 2023-08-16 NOTE — PROGRESS NOTES
Psychiatric                                                                                   OUTPATIENT PHYSICAL THERAPY    PLAN OF TREATMENT FOR OUTPATIENT REHABILITATION   Patient's Last Name, First Name, Vikash Del Toro    YOB: 1985   Provider's Name   Psychiatric   Medical Record No.  [id number]     Onset Date: 01/05/23  Start of Care Date: 08/16/23     Medical Diagnosis:  Arthropathy of shoulder region      PT Treatment Diagnosis:  Arthropathy of shoulder region Plan of Treatment  Frequency/Duration: one time per week/ 8 week    Certification date from 08/16/23 to 10/11/23         See note for plan of treatment details and functional goals     Yun Huizar, PT                          08/16/23 0500   Appointment Info   Signing clinician's name / credentials Yun Huizar PT   Total/Authorized Visits 8   Visits Used 1   Medical Diagnosis Arthropathy of shoulder region   PT Tx Diagnosis Arthropathy of shoulder region   Other pertinent information GOAL   Quick Adds Certification   Progress Note/Certification   Start of Care Date 08/16/23   Onset of illness/injury or Date of Surgery 01/05/23   Therapy Frequency one time per week   Predicted Duration 8 week   Certification date from 08/16/23   Certification date to 10/11/23   Progress Note Due Date 10/15/23   GOALS   PT Goals 2   PT Goal 1   Goal Identifier driving   Goal Description 10 minutes with 0/10 pain, currently 4-5/10   Rationale   (pain free driving)   Target Date 10/11/23   PT Goal 2   Goal Description 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, currently 5-6/10   Rationale   (pain free work)   Target Date 10/11/23   Subjective Report   Subjective Report SEE IE   Treatment Interventions (PT)   Interventions Neuromuscular Re-education;Manual Therapy   Neuromuscular Re-education   Neuromuscular re-ed of mvmt, balance, coord, kinesthetic sense, posture, proprioception  minutes (39264) 8   Neuromuscular Re-education Neuro Re-ed 2   Neuro Re-ed 1 scapular retraction/depression prone   Neuro Re-ed 1 - Details 10 x 5 second hold- inst and perform, pt demo as in sitting as option   Neuro Re-ed 2 arm raise #1 2/10 prone   Neuro Re-ed 2 - Details instruct and perform   Manual Therapy   Manual Therapy: Mobilization, MFR, MLD, friction massage minutes (02458) 8   Manual Therapy 1 right posterior RC x 6, 2 min right UT prone   Eval/Assessments   PT Eval, Low Complexity Minutes (72498) 25   Education   Learner/Method Patient   Education Comments pt instruct to perform HEP daily   Plan   Home program see PTRX   Total Session Time   Timed Code Treatment Minutes 16   Total Treatment Time (sum of timed and untimed services) 41       I CERTIFY THE NEED FOR THESE SERVICES FURNISHED UNDER        THIS PLAN OF TREATMENT AND WHILE UNDER MY CARE     (Physician attestation of this document indicates review and certification of the therapy plan).                Referring Provider:  Stone Duvall      Initial Assessment  See Epic Evaluation- Start of Care Date: 08/16/23

## 2023-08-16 NOTE — PROGRESS NOTES
PHYSICAL THERAPY EVALUATION  Type of Visit: Evaluation    See electronic medical record for Abuse and Falls Screening details.    Subjective       Presenting condition or subjective complaint: posterior and lateral right shoulder  Date of onset: 23    Relevant medical history: -- (good)   Dates & types of surgery: none    Prior diagnostic imaging/testing results: X-ray (ordered)     Prior therapy history for the same diagnosis, illness or injury: Yes  one visit Peter- no changes with exercise program ( not consistently at first and consistently for last 2-3 months    Prior Level of Function  Transfers: na  Ambulation:  na  ADL:  na  IADL:  na    Living Environment  Social support:     Type of home:     Stairs to enter the home:         Ramp:     Stairs inside the home:         Help at home:    Equipment owned:       Employment:    and   Hobbies/Interests: music making, hiking, reading, yoga, and climbing    Patient goals for therapy: driving, playing guitar    Pain assessment: Pain present  Location: right lateral and posterior shoulder, intermittent /Ratin/10 , better with heat, worse with using shoulder with driving, reaching up, reaching back and work      Objective   SHOULDER EVALUATION  PAIN: 5/10 pain level   INTEGUMENTARY (edema, incisions):  na  POSTURE: Standing Posture: Forward head, scapular winging (B)   GAIT:   Weightbearing Status: na  Assistive Device(s): None  Gait Deviations: WNL  BALANCE/PROPRIOCEPTION:  na  WEIGHTBEARING ALIGNMENT: na  ROM:   (Degrees) Left AROM Left PROM Right AROM  Right PROM   Shoulder Flexion 170  162    Shoulder Extension wnl  wnl    Shoulder Abduction 180   with painful pop    Shoulder Adduction       Shoulder Internal Rotation       Shoulder External Rotation       Shoulder Horizontal Abduction       Shoulder Horizontal Adduction       Shoulder Flexion ER       Shoulder Flexion IR       Elbow Extension wnl  wnl    Elbow Flexion  wnl  wnl     Thoracic rotation: 20 % (L), 30% ((R)     STRENGTH:   Pain: - none + mild ++ moderate +++ severe  Strength Scale: 0-5/5 Left Right   Shoulder Flexion 5 5   Shoulder Extension 5 5-   Shoulder Abduction 5 5   Shoulder Adduction       Shoulder Internal Rotation 5 5   Shoulder External Rotation 5 5-   Shoulder Horizontal Abduction     Shoulder Horizontal Adduction     Elbow Flexion 5 5   Elbow Extension     Mid Trap 3+ 3+   Lower Trap 3+ 3+   Rhomboid 5 5   Serratus Anterior 4 4     FLEXIBILITY:  not assessed  SPECIAL TESTS: na  PALPATION:  right posterior rotator cuff, upper trapezius, levator scapula   JOINT MOBILITY:  decrease upper to mid thoracic segmental mobility   Left Right   Glenohumeral anterior WNL WNL   Glenohumeral posterior WNL WNL   Glenohumeral inferior WNL WNL   Acromioclavicular WNL WNL   Scapulothoracic Hypomobile Hypomobile   Sternoclavicular WNL WNL   Scapulohumeral rhythm WNL Hypermobile     CERVICAL SCREEN:  unremarkable    Assessment & Plan   CLINICAL IMPRESSIONS  Medical Diagnosis: Arthropathy of shoulder region    Treatment Diagnosis: Arthropathy of shoulder region   Impression/Assessment: Patient is a 38 year old male with right shoulder complaints.  The following significant findings have been identified: Pain, Decreased ROM/flexibility, Decreased joint mobility, Decreased strength, and Impaired muscle performance. These impairments interfere with their ability to perform work tasks and driving  as compared to previous level of function.     Clinical Decision Making (Complexity):  Clinical Presentation: Stable/Uncomplicated  Clinical Presentation Rationale: based on medical and personal factors listed in PT evaluation  Clinical Decision Making (Complexity): Low complexity    PLAN OF CARE  Treatment Interventions:  Modalities: Cryotherapy, Hot Pack  Interventions: Manual Therapy, Neuromuscular Re-education, Therapeutic Activity, Therapeutic Exercise, Self-Care/Home  Management    Long Term Goals     PT Goal 1  Goal Identifier: driving  Goal Description: 10 minutes with 0/10 pain, currently 4-5/10  Rationale:  (pain free driving)  Target Date: 10/11/23  PT Goal 2  Goal Description: 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   Patient/Family/caregiver agrees with Plan of Care.     Evaluation Time:     PT Eval, Low Complexity Minutes (22545): 25       Signing Clinician: Yun Huizar, PT      [facility]                                                                                   OUTPATIENT PHYSICAL THERAPY      PLAN OF TREATMENT FOR OUTPATIENT REHABILITATION   Patient's Last Name, First Name, ANNALISASuryaCECYVikash Talamantes    YOB: 1985   Provider's Name   [facility]   Medical Record No.  [id number]     Onset Date: 01/05/23  Start of Care Date: 08/16/23     Medical Diagnosis:  Arthropathy of shoulder region      PT Treatment Diagnosis:  Arthropathy of shoulder region Plan of Treatment  Frequency/Duration: one time per week/ 8 week    Certification date from 08/16/23 to 10/11/23         See note for plan of treatment details and functional goals     Yun Huizar, PT                         I CERTIFY THE NEED FOR THESE SERVICES FURNISHED UNDER        THIS PLAN OF TREATMENT AND WHILE UNDER MY CARE     (Physician attestation of this document indicates review and certification of the therapy plan).                Referring Provider:  Stone Duvall      Initial Assessment  See Epic Evaluation- Start of Care Date: 08/16/23

## 2023-08-18 ENCOUNTER — ANCILLARY PROCEDURE (OUTPATIENT)
Dept: GENERAL RADIOLOGY | Facility: CLINIC | Age: 38
End: 2023-08-18
Attending: FAMILY MEDICINE
Payer: COMMERCIAL

## 2023-08-18 DIAGNOSIS — M19.019 ARTHROPATHY OF SHOULDER REGION: ICD-10-CM

## 2023-08-18 DIAGNOSIS — G89.29 CHRONIC PAIN OF RIGHT KNEE: ICD-10-CM

## 2023-08-18 DIAGNOSIS — M25.561 CHRONIC PAIN OF RIGHT KNEE: ICD-10-CM

## 2023-08-18 PROCEDURE — 73030 X-RAY EXAM OF SHOULDER: CPT | Mod: RT | Performed by: RADIOLOGY

## 2023-08-18 PROCEDURE — 73560 X-RAY EXAM OF KNEE 1 OR 2: CPT | Mod: RT | Performed by: RADIOLOGY

## 2023-08-24 ENCOUNTER — THERAPY VISIT (OUTPATIENT)
Dept: PHYSICAL THERAPY | Facility: CLINIC | Age: 38
End: 2023-08-24
Payer: COMMERCIAL

## 2023-08-24 DIAGNOSIS — M19.019 ARTHROPATHY OF SHOULDER REGION: Primary | ICD-10-CM

## 2023-08-24 PROCEDURE — 97112 NEUROMUSCULAR REEDUCATION: CPT | Mod: GP | Performed by: PHYSICAL THERAPIST

## 2023-08-24 PROCEDURE — 97140 MANUAL THERAPY 1/> REGIONS: CPT | Mod: GP | Performed by: PHYSICAL THERAPIST

## 2023-08-25 PROBLEM — M25.561 CHRONIC PAIN OF RIGHT KNEE: Status: ACTIVE | Noted: 2023-08-25

## 2023-08-25 PROBLEM — M19.019 ARTHROPATHY OF SHOULDER REGION: Status: ACTIVE | Noted: 2023-08-25

## 2023-08-25 PROBLEM — G89.29 CHRONIC PAIN OF RIGHT KNEE: Status: ACTIVE | Noted: 2023-08-25

## 2023-08-25 NOTE — PATIENT INSTRUCTIONS
Patient Education   Here is the plan from today's visit    1. Annual physical exam  - CBC with Platelets and Reflex to Iron Studies; Future  - Comprehensive metabolic panel; Future  - CBC with Platelets and Reflex to Iron Studies  - Comprehensive metabolic panel  - Vitamin B12; Future  - Folate; Future  - Vitamin D Level; Future    2. Arthropathy of shoulder region  - Physical Therapy Referral; Future  - XR Shoulder Right G/E 3 Views; Future    3. Chronic pain of right knee  - Physical Therapy Referral; Future  - XR Knee Right 1/2 Views; Future    4. Screening for HIV (human immunodeficiency virus)  - HIV Screening; Future  - HIV Screening    5. Need for hepatitis C screening test  - Hepatitis C Screen Reflex to HCV RNA Quant and Genotype; Future  - Hepatitis C Screen Reflex to HCV RNA Quant and Genotype    Please call or return to clinic if your symptoms don't go away.    Follow up plan  Return in about 6 months (around 2/14/2024).    Thank you for coming to [facility] today.  Lab Testing:  **If you had lab testing today and your results are reassuring or normal they will be mailed to you or sent through [facility] within 7 days.   **If the lab tests need quick action we will call you with the results.  **If you are having labs done on a different day, please call [phone] to schedule at [facility] or [phone] for other [facility] locations. Labs do not offer walk-in appointments.  The phone number we will call with results is # [phone] (home) . If this is not the best number please call our clinic and change the number.  Medication Refills:  If you need any refills please call your pharmacy and they will contact us.   If you need to  your refill at a new pharmacy, please contact the new pharmacy directly. The new pharmacy will help you get your medications transferred faster.   Scheduling:  If you have any concerns about today's visit or wish to schedule another  appointment please call our office during normal business hours [phone] (8-5:00 M-F). If you can no longer make a scheduled visit, please cancel via [facility] or call us to cancel.   If a referral was made to an [facility] specialty provider and you do not get a call from central scheduling, please refer to directions on your visit summary or call our office during normal business hours for assistance.   If a Mammogram was ordered for you at the [facility] call [phone] to schedule or change your appointment.  If you had an XRay/CT/Ultrasound/MRI ordered the number is [phone] to schedule or change your radiology appointment.   [facility] has limited ultrasound appointments available on Wednesdays, if you would like your ultrasound at [facility], please call [phone] to schedule.   Medical Concerns:  If you have urgent medical concerns please call [phone] at any time of the day.    Nasreen Carrasquillo, DO

## 2023-08-28 DIAGNOSIS — G89.29 CHRONIC PAIN OF RIGHT KNEE: Primary | ICD-10-CM

## 2023-08-28 DIAGNOSIS — M25.561 CHRONIC PAIN OF RIGHT KNEE: Primary | ICD-10-CM

## 2023-08-30 ENCOUNTER — TELEPHONE (OUTPATIENT)
Dept: FAMILY MEDICINE | Facility: CLINIC | Age: 38
End: 2023-08-30
Payer: COMMERCIAL

## 2023-08-30 NOTE — CONFIDENTIAL NOTE
[facility] phone call message- general phone call:    Reason for call: Patient needs letter of Medical necessity sent to their insurance ([facility]) regarding X-rays ordered on 8/14. [facility] will not pay for the X-rays and is asking for patient's doctor to send a letter with more information on why these x-rays were ordered. [facility] number [phone].    Return call needed: Yes    OK to leave a message on voice mail? Yes    Primary language: English      needed? No    Call taken on August 30, 2023 at 8:35 AM by Brenda Newberry

## 2023-09-07 ENCOUNTER — THERAPY VISIT (OUTPATIENT)
Dept: PHYSICAL THERAPY | Facility: CLINIC | Age: 38
End: 2023-09-07
Payer: COMMERCIAL

## 2023-09-07 DIAGNOSIS — M25.561 CHRONIC PAIN OF RIGHT KNEE: ICD-10-CM

## 2023-09-07 DIAGNOSIS — G89.29 CHRONIC PAIN OF RIGHT KNEE: ICD-10-CM

## 2023-09-07 PROCEDURE — 97161 PT EVAL LOW COMPLEX 20 MIN: CPT | Mod: GP | Performed by: STUDENT IN AN ORGANIZED HEALTH CARE EDUCATION/TRAINING PROGRAM

## 2023-09-07 PROCEDURE — 97110 THERAPEUTIC EXERCISES: CPT | Mod: GP | Performed by: STUDENT IN AN ORGANIZED HEALTH CARE EDUCATION/TRAINING PROGRAM

## 2023-09-07 PROCEDURE — 97530 THERAPEUTIC ACTIVITIES: CPT | Mod: GP | Performed by: STUDENT IN AN ORGANIZED HEALTH CARE EDUCATION/TRAINING PROGRAM

## 2023-09-07 ASSESSMENT — ACTIVITIES OF DAILY LIVING (ADL)
GO DOWN STAIRS: ACTIVITY IS MINIMALLY DIFFICULT
STIFFNESS: I DO NOT HAVE THE SYMPTOM
PAIN: THE SYMPTOM AFFECTS MY ACTIVITY SLIGHTLY
LIMPING: I DO NOT HAVE THE SYMPTOM
AS_A_RESULT_OF_YOUR_KNEE_INJURY,_HOW_WOULD_YOU_RATE_YOUR_CURRENT_LEVEL_OF_DAILY_ACTIVITY?: NEARLY NORMAL
KNEEL ON THE FRONT OF YOUR KNEE: ACTIVITY IS MINIMALLY DIFFICULT
RISE FROM A CHAIR: ACTIVITY IS NOT DIFFICULT
GIVING WAY, BUCKLING OR SHIFTING OF KNEE: THE SYMPTOM AFFECTS MY ACTIVITY SLIGHTLY
HOW_WOULD_YOU_RATE_THE_OVERALL_FUNCTION_OF_YOUR_KNEE_DURING_YOUR_USUAL_DAILY_ACTIVITIES?: NEARLY NORMAL
RAW_SCORE: 61
SWELLING: I DO NOT HAVE THE SYMPTOM
WEAKNESS: I HAVE THE SYMPTOM BUT IT DOES NOT AFFECT MY ACTIVITY
SQUAT: ACTIVITY IS MINIMALLY DIFFICULT
HOW_WOULD_YOU_RATE_THE_CURRENT_FUNCTION_OF_YOUR_KNEE_DURING_YOUR_USUAL_DAILY_ACTIVITIES_ON_A_SCALE_FROM_0_TO_100_WITH_100_BEING_YOUR_LEVEL_OF_KNEE_FUNCTION_PRIOR_TO_YOUR_INJURY_AND_0_BEING_THE_INABILITY_TO_PERFORM_ANY_OF_YOUR_USUAL_DAILY_ACTIVITIES?: 70
SIT WITH YOUR KNEE BENT: ACTIVITY IS NOT DIFFICULT
KNEE_ACTIVITY_OF_DAILY_LIVING_SCORE: 87.14
STAND: ACTIVITY IS NOT DIFFICULT
GO UP STAIRS: ACTIVITY IS MINIMALLY DIFFICULT
KNEE_ACTIVITY_OF_DAILY_LIVING_SUM: 61
WALK: ACTIVITY IS NOT DIFFICULT

## 2023-09-07 NOTE — PROGRESS NOTES
"PHYSICAL THERAPY EVALUATION  Type of Visit: Evaluation    See electronic medical record for Abuse and Falls Screening details.    Subjective       Presenting condition or subjective complaint: I experience pain in my knee when sitting cross legged, or any position of sitting on the floor with my knee bent. Squats, climbing stairs, and long runs also affect my comfort level and can cause pain. Has had right joint pain and ankle injuries throughout his life. Descending hills and stairs can cause pain. Pain is sharp and shooting during his specific activities but goes away immediately. Does not make him wake from sleep but does have some discomfort on the anterior aspect of the knee when sleeping on his back. When the pain is active its a 6/10.  Wants to get back to yoga and stairs without pain.     Date of onset: 08/28/23    Relevant medical history: -- (good)   Dates & types of surgery: none    Prior diagnostic imaging/testing results: X-ray     Prior therapy history for the same diagnosis, illness or injury: No 2022 one visit Peter- no changes with exercise program ( not consistently at first and consistently for last 2-3 months    Prior Level of Function  Transfers: Independent  Ambulation: Independent  ADL: Independent    Living Environment  Social support: Alone   Type of home: Apartment/condo   Stairs to enter the home: Yes 20 Is there a railing: Yes   Ramp: No   Stairs inside the home: No       Help at home: None  Equipment owned:       Employment: Yes , Music Therapist,   Hobbies/Interests: Music (playing, writing, recording), yoga, reading, hiking    Patient goals for therapy: Sit cross legged, yoga stretches that involve that position, run/climb stairs without pain.       Objective     KNEE EVALUATION    Static Posture  Foot: Pes planus/cavus: slight pes cavus bilat   too many toes sign:\" pos bilat  Knee: Varus/Valgus: valgus bilat  Knee Recurvatum: mild bilat    Lumbar Spine: " WNL    Dynamic Movement Screen  Single leg stance observations: WNL  Double limb squat observations: WNL  Single limb squat observations: WNL B  Gait: toe out bilat    Range of Motion      Knee PROM Extension Flexion   Left WNL WNL   Right WNL WNL     Knee AROM Extension Flexion   Left WNL WNL   Right WNL WNL          Hip and Knee Strength   MMT Hip Abduction Hip Extension Hip ER Knee Flexion   Left 5/5 5/5 5/5 4/5   Right 5/5 5/5 4+/5 4/5 with pain       Knee ligaments and meniscus screen: normal    Patellofemoral screen: normal mobility bilat without pain    PALPATION  Left: WNL  Right: no tenderness upon palpation        Assessment & Plan   CLINICAL IMPRESSIONS  Medical Diagnosis: Chronic pain of right knee    Treatment Diagnosis: chronic pain of right knee   Impression/Assessment: Patient is a 38 year old male with chronic right knee complaints.  The following significant findings have been identified: Pain, Decreased joint mobility, and Decreased activity tolerance. These impairments interfere with their ability to perform self care tasks, work tasks, and recreational activities as compared to previous level of function.     Clinical Decision Making (Complexity):  Clinical Presentation: Stable/Uncomplicated  Clinical Presentation Rationale: based on medical and personal factors listed in PT evaluation  Clinical Decision Making (Complexity): Low complexity    PLAN OF CARE  Treatment Interventions:  Interventions: Gait Training, Manual Therapy, Neuromuscular Re-education, Therapeutic Activity, Therapeutic Exercise    Long Term Goals     PT Goal 1  Goal Identifier: Seated position  Goal Description: Pt will sit cross legged on floor without knee pain  Rationale: to maximize safety and independence with performance of ADLs and functional tasks;to maximize safety and independence with self cares  Target Date: 11/02/23  PT Goal 2  Goal Identifier: Return to activity  Goal Description: pt bud participate in 30 minutes  of yoga without pain  Rationale: to maximize safety and independence with performance of ADLs and functional tasks;to maximize safety and independence with self cares  Target Date: 11/02/23      Frequency of Treatment: every other week  Duration of Treatment: 8 weeks    Education Assessment:   Learner/Method: Patient  Education Comments: pt instruct to perform HEP daily    PT Eval, Low Complexity Minutes (01237): 10Signing Clinician: CHRIS COOPER PT      [facility]                                                                                   OUTPATIENT PHYSICAL THERAPY      PLAN OF TREATMENT FOR OUTPATIENT REHABILITATION   Patient's Last Name, First Name, M.IVikash Talamantes    YOB: 1985   Provider's Name   [facility]   Medical Record No.  [id number]     Onset Date: 08/28/23  Start of Care Date: 09/07/23     Medical Diagnosis:  Chronic pain of right knee      PT Treatment Diagnosis:  chronic pain of right knee Plan of Treatment  Frequency/Duration: every other week/ 8 weeks    Certification date from 09/07/23 to 11/02/23         See note for plan of treatment details and functional goals     CHRIS COOPER PT                         I CERTIFY THE NEED FOR THESE SERVICES FURNISHED UNDER        THIS PLAN OF TREATMENT AND WHILE UNDER MY CARE     (Physician attestation of this document indicates review and certification of the therapy plan).                Referring Provider:  Nasreen Carrasquillo      Initial Assessment  See Epic Evaluation- Start of Care Date: 09/07/23

## 2023-09-12 ENCOUNTER — THERAPY VISIT (OUTPATIENT)
Dept: PHYSICAL THERAPY | Facility: CLINIC | Age: 38
End: 2023-09-12
Payer: COMMERCIAL

## 2023-09-12 DIAGNOSIS — M19.019 ARTHROPATHY OF SHOULDER REGION: Primary | ICD-10-CM

## 2023-09-12 PROCEDURE — 97140 MANUAL THERAPY 1/> REGIONS: CPT | Mod: GP | Performed by: PHYSICAL THERAPIST

## 2023-09-12 PROCEDURE — 97112 NEUROMUSCULAR REEDUCATION: CPT | Mod: GP | Performed by: PHYSICAL THERAPIST

## 2023-09-14 ENCOUNTER — LAB (OUTPATIENT)
Dept: LAB | Facility: CLINIC | Age: 38
End: 2023-09-14
Payer: COMMERCIAL

## 2023-09-14 DIAGNOSIS — Z00.00 ANNUAL PHYSICAL EXAM: ICD-10-CM

## 2023-09-14 LAB — FOLATE SERPL-MCNC: 9.9 NG/ML (ref 4.6–34.8)

## 2023-09-14 PROCEDURE — 82306 VITAMIN D 25 HYDROXY: CPT

## 2023-09-14 PROCEDURE — 82746 ASSAY OF FOLIC ACID SERUM: CPT

## 2023-09-14 PROCEDURE — 36415 COLL VENOUS BLD VENIPUNCTURE: CPT

## 2023-09-14 PROCEDURE — 82607 VITAMIN B-12: CPT

## 2023-09-15 LAB — VIT B12 SERPL-MCNC: 312 PG/ML (ref 232–1245)

## 2023-09-18 LAB — DEPRECATED CALCIDIOL+CALCIFEROL SERPL-MC: 29 UG/L (ref 20–75)

## 2023-09-21 ENCOUNTER — THERAPY VISIT (OUTPATIENT)
Dept: PHYSICAL THERAPY | Facility: CLINIC | Age: 38
End: 2023-09-21
Payer: COMMERCIAL

## 2023-09-21 DIAGNOSIS — M25.561 CHRONIC PAIN OF RIGHT KNEE: Primary | ICD-10-CM

## 2023-09-21 DIAGNOSIS — G89.29 CHRONIC PAIN OF RIGHT KNEE: Primary | ICD-10-CM

## 2023-09-21 PROCEDURE — 97530 THERAPEUTIC ACTIVITIES: CPT | Mod: GP | Performed by: STUDENT IN AN ORGANIZED HEALTH CARE EDUCATION/TRAINING PROGRAM

## 2023-09-21 PROCEDURE — 97112 NEUROMUSCULAR REEDUCATION: CPT | Mod: 59 | Performed by: STUDENT IN AN ORGANIZED HEALTH CARE EDUCATION/TRAINING PROGRAM

## 2023-09-21 PROCEDURE — 97110 THERAPEUTIC EXERCISES: CPT | Mod: 59 | Performed by: STUDENT IN AN ORGANIZED HEALTH CARE EDUCATION/TRAINING PROGRAM

## 2023-09-28 ENCOUNTER — THERAPY VISIT (OUTPATIENT)
Dept: PHYSICAL THERAPY | Facility: CLINIC | Age: 38
End: 2023-09-28
Payer: COMMERCIAL

## 2023-09-28 DIAGNOSIS — M19.019 ARTHROPATHY OF SHOULDER REGION: Primary | ICD-10-CM

## 2023-09-28 PROCEDURE — 97110 THERAPEUTIC EXERCISES: CPT | Mod: GP | Performed by: PHYSICAL THERAPIST

## 2023-09-28 PROCEDURE — 97140 MANUAL THERAPY 1/> REGIONS: CPT | Mod: GP | Performed by: PHYSICAL THERAPIST

## 2023-10-10 ENCOUNTER — THERAPY VISIT (OUTPATIENT)
Dept: PHYSICAL THERAPY | Facility: CLINIC | Age: 38
End: 2023-10-10
Payer: COMMERCIAL

## 2023-10-10 DIAGNOSIS — M19.019 ARTHROPATHY OF SHOULDER REGION: Primary | ICD-10-CM

## 2023-10-10 PROCEDURE — 97112 NEUROMUSCULAR REEDUCATION: CPT | Mod: GP | Performed by: PHYSICAL THERAPIST

## 2023-10-10 PROCEDURE — 97140 MANUAL THERAPY 1/> REGIONS: CPT | Mod: GP | Performed by: PHYSICAL THERAPIST

## 2023-10-25 ENCOUNTER — THERAPY VISIT (OUTPATIENT)
Dept: PHYSICAL THERAPY | Facility: CLINIC | Age: 38
End: 2023-10-25
Payer: COMMERCIAL

## 2023-10-25 DIAGNOSIS — M19.019 ARTHROPATHY OF SHOULDER REGION: Primary | ICD-10-CM

## 2023-10-25 PROCEDURE — 97140 MANUAL THERAPY 1/> REGIONS: CPT | Mod: GP | Performed by: PHYSICAL THERAPIST

## 2023-10-25 PROCEDURE — 97112 NEUROMUSCULAR REEDUCATION: CPT | Mod: GP | Performed by: PHYSICAL THERAPIST

## 2023-10-25 NOTE — PROGRESS NOTES
[facility]                                                                                   OUTPATIENT PHYSICAL THERAPY    PLAN OF TREATMENT FOR OUTPATIENT REHABILITATION   Patient's Last Name, First Name, Vikash Del Toro    YOB: 1985   Provider's Name   [facility]   Medical Record No.  [id number]     Onset Date: 01/05/23  Start of Care Date: 08/16/23     Medical Diagnosis:  RArthropathy of shoulder region      PT Treatment Diagnosis:  Arthropathy of shoulder region Plan of Treatment  Frequency/Duration: one time per week/ 8 week    Certification date from (P) 10/25/23 to (P) 11/29/23         See note for plan of treatment details and functional goals     Yun Huizar, PT                          10/25/23 0500   Appointment Info   Signing clinician's name / credentials Yun Huizar   Total/Authorized Visits 8   Visits Used 7   Medical Diagnosis RArthropathy of shoulder region   PT Tx Diagnosis Arthropathy of shoulder region   Other pertinent information GOAL   Quick Adds Certification   Progress Note/Certification   Start of Care Date 08/16/23   Onset of illness/injury or Date of Surgery 01/05/23   Therapy Frequency one time per week   Predicted Duration 8 week   Certification date from 10/25/23   Certification date to 11/29/23   GOALS   PT Goals 2   PT Goal 1   Goal Identifier driving   Goal Description 10 minutes with 0/10 pain, currently 4-5/10   Rationale   (pain free driving)   Goal Progress 3/10 pain level and improving   Target Date 11/29/23   PT Goal 2   Goal Description 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, currently 5-6/10   Rationale   (pain free work)   Goal Progress has not tested   Target Date 11/29/23   Subjective Report   Subjective Report SEE SC   Treatment Interventions (PT)   Interventions Neuromuscular Re-education;Manual Therapy   Therapeutic Procedure/Exercise   PTRx Ther Proc 1 Rhomboid Stretch    PTRx Ther Proc 1 - Details 15 sec x 3    PTRx Ther Proc 2 Side-lying Posterior Capsule Stretch   PTRx Ther Proc 2 - Details 10 second hold x 10    PTRx Ther Proc 3 Supine Ceiling Punch   PTRx Ther Proc 3 - Details 2 sets 15 with red tubing each side ( up count 4 and down count 4)   PTRx Ther Proc 4 Shoulder Theraband Adduction   PTRx Ther Proc 4 - Details 2 sets 10 green red tubing each side   Skilled Intervention to improve shoulder girdle strength   Patient Response/Progress pt tolerated well   Neuromuscular Re-education   PTRx Neuro Re-ed 1 Push-Up Plus At Wall- instruct and perform   PTRx Neuro Re-ed 1 - Details 2 sets 15 with rounded slightly (not squeezing shoulder blades together)    PTRx Neuro Re-ed 2 Supine Ceiling Punch -review and discuss anatomy and perform   PTRx Neuro Re-ed 2 - Details 1 sets 15 with  red  tubing each side ( up count 4 and down count 4)    PTRx Neuro Re-ed 3 Shoulder Theraband Rows   PTRx Neuro Re-ed 3 - Details 2 sets 15 green tubing - single row and tighten abds so body does not rotateeach side    PTRx Neuro Re-ed 4 Shoulder Theraband Adduction   PTRx Neuro Re-ed 4 - Details 2 sets 15   green   tubing each side   Skilled Intervention improve shoulder girdle muscle function   Patient Response/Progress pt tolerated well   Manual Therapy   Manual Therapy Manual Therapy 2   Manual Therapy 1 right posterior RC x 4   Manual Therapy 2 thoracic rotational mobs: grade 3-4  5 min each side   Skilled Intervention to improve shoulder mobility   Patient Response/Progress significant increase in shoulder IR   Education   Learner/Method Patient   Education Comments advanced strengthening program   Plan   Home program continue daily   Updates to plan of care see PTRX       I CERTIFY THE NEED FOR THESE SERVICES FURNISHED UNDER        THIS PLAN OF TREATMENT AND WHILE UNDER MY CARE     (Physician attestation of this document indicates review and certification of the therapy plan).                 Referring Provider:  Stone Duvall      Initial Assessment  See Epic Evaluation- Start of Care Date: 08/16/23

## 2023-10-25 NOTE — PROGRESS NOTES
PROGRESS  REPORT    Progress reporting period is from 8- to 10-.       SUBJECTIVE  Subjective changes noted by patient:  Moderate improvement overall. He notes decreased pain with driving. 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       Current pain level is 4/10  .     Previous pain level was  5/10  .   Changes in function:  Yes (See Goal flowsheet attached for changes in current functional level)  Adverse reaction to treatment or activity: None    OBJECTIVE  Changes noted in objective findings:  ROM:   (Degrees) Left AROM Left PROM Right AROM  Right PROM   Shoulder Flexion 170   162     Shoulder Extension wnl   wnl     Shoulder Abduction 180    120 with pain to 140 with painful pop     Shoulder Adduction           Shoulder Internal Rotation  90    45     Shoulder External Rotation  85 with end range tightness    85 degrees with end range tightness      Shoulder Horizontal Abduction           Shoulder Horizontal Adduction           Shoulder Flexion ER           Shoulder Flexion IR           Elbow Extension wnl   wnl     Elbow Flexion wnl   wnl      Thoracic rotation: 35 % (L), 40% ((R)      STRENGTH:   Pain: - none + mild ++ moderate +++ severe  Strength Scale: 0-5/5 Left Right   Shoulder Flexion 5 5   Shoulder Extension 5 5-   Shoulder Abduction 5 5   Shoulder Adduction          Shoulder Internal Rotation 5 5   Shoulder External Rotation 5 5-   Shoulder Horizontal Abduction       Shoulder Horizontal Adduction       Elbow Flexion 5 5   Elbow Extension       Mid Trap 4- 3+   Lower Trap 3+ 3+   Rhomboid 5 5   Serratus Anterior        FLEXIBILITY:  not assessed  SPECIAL TESTS: na  PALPATION:  right posterior rotator cuff, upper trapezius, levator scapula   JOINT MOBILITY:  decrease upper to mid thoracic segmental mobility    Left Right   Glenohumeral anterior WNL WNL   Glenohumeral posterior WNL WNL   Glenohumeral inferior WNL WNL   Acromioclavicular WNL WNL    Scapulothoracic Hypomobile Hypomobile   Sternoclavicular WNL WNL   Scapulohumeral rhythm WNL Hypermobile      CERVICAL SCREEN:  unremarkable              ASSESSMENT/PLAN  Updated problem list and treatment plan: Diagnosis 1:   right shoulder arthopathy   Pain -  hot/cold therapy and manual therapy  Decreased joint mobility - manual therapy, therapeutic exercise, therapeutic activity, and home program  Decreased strength - therapeutic exercise, therapeutic activities, and home program  Decreased function - therapeutic activities and home program  STG/LTGs have been met or progress has been made towards goals:  Yes (See Goal flow sheet completed today.)  Assessment of Progress: The patient's condition is improving.  Self Management Plans:  Patient has been instructed in a home treatment program.  Patient is independent in a home treatment program.  Patient  has been instructed in self management of symptoms.  I have re-evaluated this patient and find that the nature, scope, duration and intensity of the therapy is appropriate for the medical condition of the patient.  Vikash continues to require the following intervention to meet STG and LTG's:  PT    Recommendations:  This patient would benefit from continued therapy.     Frequency:  1 X week, once daily  Duration:  for 4 weeks    Please refer to the daily flowsheet for treatment today, total treatment time and time spent performing 1:1 timed codes.

## 2023-11-02 ENCOUNTER — THERAPY VISIT (OUTPATIENT)
Dept: PHYSICAL THERAPY | Facility: CLINIC | Age: 38
End: 2023-11-02
Payer: COMMERCIAL

## 2023-11-02 DIAGNOSIS — G89.29 CHRONIC PAIN OF RIGHT KNEE: Primary | ICD-10-CM

## 2023-11-02 DIAGNOSIS — M25.561 CHRONIC PAIN OF RIGHT KNEE: Primary | ICD-10-CM

## 2023-11-02 PROCEDURE — 97112 NEUROMUSCULAR REEDUCATION: CPT | Mod: GP | Performed by: STUDENT IN AN ORGANIZED HEALTH CARE EDUCATION/TRAINING PROGRAM

## 2023-11-02 PROCEDURE — 97110 THERAPEUTIC EXERCISES: CPT | Mod: GP | Performed by: STUDENT IN AN ORGANIZED HEALTH CARE EDUCATION/TRAINING PROGRAM

## 2023-11-02 ASSESSMENT — ACTIVITIES OF DAILY LIVING (ADL)
STIFFNESS: I DO NOT HAVE THE SYMPTOM
AS_A_RESULT_OF_YOUR_KNEE_INJURY,_HOW_WOULD_YOU_RATE_YOUR_CURRENT_LEVEL_OF_DAILY_ACTIVITY?: NEARLY NORMAL
PAIN: THE SYMPTOM AFFECTS MY ACTIVITY MODERATELY
WEAKNESS: THE SYMPTOM AFFECTS MY ACTIVITY SLIGHTLY
HOW_WOULD_YOU_RATE_THE_OVERALL_FUNCTION_OF_YOUR_KNEE_DURING_YOUR_USUAL_DAILY_ACTIVITIES?: ABNORMAL
HOW_WOULD_YOU_RATE_THE_CURRENT_FUNCTION_OF_YOUR_KNEE_DURING_YOUR_USUAL_DAILY_ACTIVITIES_ON_A_SCALE_FROM_0_TO_100_WITH_100_BEING_YOUR_LEVEL_OF_KNEE_FUNCTION_PRIOR_TO_YOUR_INJURY_AND_0_BEING_THE_INABILITY_TO_PERFORM_ANY_OF_YOUR_USUAL_DAILY_ACTIVITIES?: 50
GO DOWN STAIRS: ACTIVITY IS SOMEWHAT DIFFICULT
RAW_SCORE: 53
SQUAT: ACTIVITY IS SOMEWHAT DIFFICULT
KNEE_ACTIVITY_OF_DAILY_LIVING_SCORE: 75.71
SWELLING: I DO NOT HAVE THE SYMPTOM
LIMPING: I DO NOT HAVE THE SYMPTOM
RISE FROM A CHAIR: ACTIVITY IS MINIMALLY DIFFICULT
GO UP STAIRS: ACTIVITY IS MINIMALLY DIFFICULT
WALK: ACTIVITY IS MINIMALLY DIFFICULT
KNEEL ON THE FRONT OF YOUR KNEE: ACTIVITY IS NOT DIFFICULT
SIT WITH YOUR KNEE BENT: ACTIVITY IS VERY DIFFICULT
STAND: ACTIVITY IS NOT DIFFICULT
GIVING WAY, BUCKLING OR SHIFTING OF KNEE: I HAVE THE SYMPTOM BUT IT DOES NOT AFFECT MY ACTIVITY
KNEE_ACTIVITY_OF_DAILY_LIVING_SUM: 53

## 2023-11-02 NOTE — PROGRESS NOTES
11/02/23 0500   Appointment Info   Signing clinician's name / credentials Shauna Toth DPT   Total/Authorized Visits 8 E+T   Visits Used 3   Medical Diagnosis Chronic pain of right knee   PT Tx Diagnosis chronic pain of right knee   Progress Note/Certification   Start of Care Date 09/07/23   Onset of illness/injury or Date of Surgery 08/28/23   Therapy Frequency every other week   Predicted Duration 8 weeks   Certification date from 11/02/23   Certification date to 12/28/23   Progress Note Due Date 11/02/23   Progress Note Completed Date 11/02/23   PT Goal 1   Goal Identifier Seated position   Goal Description Pt will sit cross legged on floor without knee pain   Rationale to maximize safety and independence with performance of ADLs and functional tasks;to maximize safety and independence with self cares   Target Date 11/02/23   PT Goal 2   Goal Description pt bud participate in 30 minutes of yoga without pain   Rationale to maximize safety and independence with performance of ADLs and functional tasks;to maximize safety and independence with self cares   Goal Progress has not tested   Target Date 11/02/23   Goal Identifier Return to activity   Date Met 11/02/23   Subjective Report   Subjective Report Still having pain every day, mostly with sitting on the floor or randomly with walking. Pain is a 5/10 on average when he feels it.   Objective Measures   Objective Measures Objective Measure 1   Treatment Interventions (PT)   Interventions Therapeutic Procedure/Exercise;Therapeutic Activity;Manual Therapy;Neuromuscular Re-education   Therapeutic Procedure/Exercise   PTRx Ther Proc 1 standing hip abduction with IR x 12 e   PTRx Ther Proc 2 Hamstring physioball curl DL -> SL 1 x 10 reps   PTRx Ther Proc 2 - Details HEP   PTRx Ther Proc 3 physioball wall squat hold 2 x 6 holding 10 sec   Therapeutic Procedures: strength, endurance, ROM, flexibillity minutes (17902) 25   Ther Proc 1 SL calf raise off step 2 x 12  "  Therapeutic Activity   Therapeutic Activities Ther Act 2   Neuromuscular Re-education   Neuromuscular re-ed of mvmt, balance, coord, kinesthetic sense, posture, proprioception minutes (18952) 10   PTRx Neuro Re-ed 1 Lateral step down 6\" 1 x 10   Neuro Re-ed 1 3 way lunge with cone reach 2 x 3 rounds   Neuro Re-ed 2 anterior step down 6\" 1 x 10   Education   Learner/Method Patient   Plan   Home program PTRx   Plan for next session work into knee flexion end range and nhung cross motion   Comments   Comments no pain with exercises today   Total Session Time   Timed Code Treatment Minutes 35   Total Treatment Time (sum of timed and untimed services) 35         [facility]                                                                                   OUTPATIENT PHYSICAL THERAPY    PLAN OF TREATMENT FOR OUTPATIENT REHABILITATION   Patient's Last Name, First Name, Vikash Del Toro    YOB: 1985   Provider's Name   [facility]   Medical Record No.  [id number]     Onset Date: 08/28/23  Start of Care Date: 09/07/23     Medical Diagnosis:  Chronic pain of right knee      PT Treatment Diagnosis:  chronic pain of right knee Plan of Treatment  Frequency/Duration: every other week/ 8 weeks    Certification date from 11/02/23 to 12/28/23         See note for plan of treatment details and functional goals     CHRIS COOPER, PT                         I CERTIFY THE NEED FOR THESE SERVICES FURNISHED UNDER        THIS PLAN OF TREATMENT AND WHILE UNDER MY CARE     (Physician attestation of this document indicates review and certification of the therapy plan).                Referring Provider:  Nasreen Carrasquillo      Initial Assessment  See Epic Evaluation- Start of Care Date: 09/07/23          "

## 2023-11-30 ENCOUNTER — THERAPY VISIT (OUTPATIENT)
Dept: PHYSICAL THERAPY | Facility: CLINIC | Age: 38
End: 2023-11-30
Payer: COMMERCIAL

## 2023-11-30 DIAGNOSIS — M25.561 CHRONIC PAIN OF RIGHT KNEE: Primary | ICD-10-CM

## 2023-11-30 DIAGNOSIS — G89.29 CHRONIC PAIN OF RIGHT KNEE: Primary | ICD-10-CM

## 2023-11-30 PROCEDURE — 97112 NEUROMUSCULAR REEDUCATION: CPT | Mod: GP | Performed by: STUDENT IN AN ORGANIZED HEALTH CARE EDUCATION/TRAINING PROGRAM

## 2023-11-30 PROCEDURE — 97110 THERAPEUTIC EXERCISES: CPT | Mod: GP | Performed by: STUDENT IN AN ORGANIZED HEALTH CARE EDUCATION/TRAINING PROGRAM

## 2023-11-30 PROCEDURE — 97530 THERAPEUTIC ACTIVITIES: CPT | Mod: GP | Performed by: STUDENT IN AN ORGANIZED HEALTH CARE EDUCATION/TRAINING PROGRAM

## 2023-12-05 ENCOUNTER — THERAPY VISIT (OUTPATIENT)
Dept: PHYSICAL THERAPY | Facility: CLINIC | Age: 38
End: 2023-12-05
Payer: COMMERCIAL

## 2023-12-05 DIAGNOSIS — M19.019 ARTHROPATHY OF SHOULDER REGION: Primary | ICD-10-CM

## 2023-12-05 PROCEDURE — 97530 THERAPEUTIC ACTIVITIES: CPT | Mod: GP | Performed by: PHYSICAL THERAPIST

## 2023-12-05 PROCEDURE — 97110 THERAPEUTIC EXERCISES: CPT | Mod: 59 | Performed by: PHYSICAL THERAPIST

## 2023-12-05 PROCEDURE — 97112 NEUROMUSCULAR REEDUCATION: CPT | Mod: 59 | Performed by: PHYSICAL THERAPIST

## 2023-12-05 NOTE — PROGRESS NOTES
DISCHARGE REPORT    Progress reporting period is from 1025-2023 to 12-5-2023.       SUBJECTIVE  Subjective changes noted by patient:   Overall improvement has been moderate to significant.  Patient start [employer] class and aggravated right shoulder and is 2x/ week. He is tolerating exercise        Current pain level is 3/10  .     Previous pain level was  5/10  .   Changes in function:  Yes (See Goal flowsheet attached for changes in current functional level)  Adverse reaction to treatment or activity: None    OBJECTIVE  Changes noted in objective findings:     OBJECTIVE  Changes noted in objective findings:  ROM:   (Degrees) Left AROM Left PROM Right AROM  Right PROM   Shoulder Flexion 170   170 with end range pain      Shoulder Extension wnl   wnl     Shoulder Abduction 180    165 with pain      Shoulder Adduction           Shoulder Internal Rotation  90    85 with end range tightness      Shoulder External Rotation  85 with end range tightness     90 degrees with end range tightness      Shoulder Horizontal Abduction           Shoulder Horizontal Adduction           Shoulder Flexion ER           Shoulder Flexion IR           Elbow Extension wnl   wnl     Elbow Flexion wnl   wnl        Pain: - none + mild ++ moderate +++ severe  Strength Scale: 0-5/5 Left Right   Shoulder Flexion 5 5   Shoulder Extension 5 5-/5-5/5   Shoulder Abduction 5 5   Shoulder Adduction          Shoulder Internal Rotation 5 5   Shoulder External Rotation 5 5-/5-5/5   Shoulder Horizontal Abduction       Shoulder Horizontal Adduction       Elbow Flexion 5 5   Elbow Extension       Mid Trap 4- 4   Lower Trap     Rhomboid 5 5   Serratus Anterior             ASSESSMENT/PLAN  Updated problem list and treatment plan: Diagnosis 1:   right shoulder arthopathy   Pain -  hot/cold therapy and manual therapy  Decreased joint mobility - manual therapy, therapeutic exercise, therapeutic activity, and home program  Decreased strength - therapeutic exercise,  therapeutic activities, and home program  Decreased function - therapeutic activities and home program  STG/LTGs have been met or progress has been made towards goals:  Yes (See Goal flow sheet completed today.)  Assessment of Progress: The patient's condition is improving.  Self Management Plans:  Patient has been instructed in a home treatment program.  Patient is independent in a home treatment program.  Patient  has been instructed in self management of symptoms.  I have re-evaluated this patient and find that the nature, scope, duration and intensity of the therapy is appropriate for the medical condition of the patient.  Vikash continues to require the following intervention to meet STG and LTG's:  PT     Recommendations:  This patient would benefit from continued therapy.     Frequency:  1 X week, once daily  Duration:  for 4 weeks     Please refer to the daily flowsheet for treatment today, total treatment time and time spent performing 1:1 timed codes.                    ASSESSMENT/PLAN  Updated problem list and treatment plan: Diagnosis 1:   right shoulder arthopathy   Pain -  hot/cold therapy and manual therapy  Decreased joint mobility - manual therapy, therapeutic exercise, therapeutic activity, and home program  Decreased strength - therapeutic exercise, therapeutic activities, and home program    STG/LTGs have been met or progress has been made towards goals:  Yes (See Goal flow sheet completed today.)  Assessment of Progress: The patient's condition is improving.  Self Management Plans:  Patient is independent in a home treatment program.  Patient  has been instructed in self management of symptoms.  Patient is independent in self management of symptoms.  I have re-evaluated this patient and find that the nature, scope, duration and intensity of the therapy is appropriate for the medical condition of the patient.  Vikash continues to require the following intervention to meet STG and LTG's:  PT  intervention is no longer required to meet STG/LTG.    Recommendations:  This patient is ready to be discharged from therapy and continue their home treatment program.    Please refer to the daily flowsheet for treatment today, total treatment time and time spent performing 1:1 timed codes.

## 2023-12-05 NOTE — PROGRESS NOTES
[facility]                                                                                   OUTPATIENT PHYSICAL THERAPY    PLAN OF TREATMENT FOR OUTPATIENT REHABILITATION   Patient's Last Name, First Name, Vikash Del Toro    YOB: 1985   Provider's Name   [facility]   Medical Record No.  [id number]     Onset Date: 01/05/23  Start of Care Date: 08/16/23     Medical Diagnosis:  RArthropathy of shoulder region      PT Treatment Diagnosis:  Arthropathy of shoulder region Plan of Treatment  Frequency/Duration: one time per week/ 8 week    Certification date from 12/05/23 to 01/02/24         See note for plan of treatment details and functional goals     Yun Huizar, PT                          12/05/23 0500   Appointment Info   Signing clinician's name / credentials Yun Huizar   Total/Authorized Visits 8   Visits Used 8   Medical Diagnosis RArthropathy of shoulder region   PT Tx Diagnosis Arthropathy of shoulder region   Other pertinent information GOAL   Quick Adds Certification   Progress Note/Certification   Start of Care Date 08/16/23   Onset of illness/injury or Date of Surgery 01/05/23   Therapy Frequency one time per week   Predicted Duration 8 week   Certification date from 12/05/23   Certification date to 01/02/24   GOALS   PT Goals 2   PT Goal 1   Goal Identifier driving   Goal Description 10 minutes with 0/10 pain, currently 4-5/10   Rationale   (pain free driving)   Goal Progress 1-2/10 pain level and improving   Target Date 01/02/24   PT Goal 2   Goal Description 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, currently 5-6/10   Rationale   (pain free work)   Goal Progress has not tested   Target Date 11/29/23   Treatment Interventions (PT)   Interventions Neuromuscular Re-education;Manual Therapy   Therapeutic Procedure/Exercise   PTRx Ther Proc 1 Upper Trapezius Stretch   PTRx Ther Proc 1 - Details 3 with 15 second  hold each side  with and without hand as option    PTRx Ther Proc 2 Side-lying Posterior Capsule Stretch   PTRx Ther Proc 2 - Details 10 second hold x 10    PTRx Ther Proc 3 Levator Scapulae Stretch   PTRx Ther Proc 3 - Details 3 with 15 second hold each side  with and without hand as option  can perform on opposite side    PTRx Ther Proc 4 Rhomboid Stretch   PTRx Ther Proc 4 - Details 15 sec x 3    PTRx Ther Proc 5 Thoracic Rotation   PTRx Ther Proc 5 - Details 10 x each side    Skilled Intervention to improve shoulder girdle strength   Patient Response/Progress pt tolerated well   Neuromuscular Re-education   PTRx Neuro Re-ed 1 Prone Arm Raise #1   PTRx Neuro Re-ed 1 - Details 3 sets 10 4 # Standing   PTRx Neuro Re-ed 2 Supine Dumbbell Flies   PTRx Neuro Re-ed 2 - Details 3 sets 10 4# (lower arms until just in front of shoulder)    PTRx Neuro Re-ed 3 Push-Up Plus At Wall   PTRx Neuro Re-ed 3 - Details 2 sets 15 with rounded slightly (not squeezing shoulder blades together)    PTRx Neuro Re-ed 4 Supine Ceiling Punch   PTRx Neuro Re-ed 4 - Details 1 sets 20 with blue  tubing each side ( up count 4 and down count 4)    PTRx Neuro Re-ed 5 Shoulder Theraband Rows   PTRx Neuro Re-ed 5 - Details   1 set 30  blue tubing - single row and tighten abds so body does not rotateeach side    PTRx Neuro Re-ed 6 Prone Arm Raise #1   PTRx Neuro Re-ed 6 - Details 2 sets 10 3 # Standing   Skilled Intervention improve shoulder girdle muscle function   Patient Response/Progress pt tolerated well   Manual Therapy   Manual Therapy Manual Therapy 2   Manual Therapy 1 right posterior RC x 13   Manual Therapy 2 thoracic rotational mobs: grade 3-4  5 min each side   Skilled Intervention to improve shoulder mobility   Patient Response/Progress significant increase in shoulder IR   Education   Learner/Method Patient   Education Comments advanced strengthening program   Plan   Home program continue daily   Updates to plan of care see PTRX        I CERTIFY THE NEED FOR THESE SERVICES FURNISHED UNDER        THIS PLAN OF TREATMENT AND WHILE UNDER MY CARE     (Physician attestation of this document indicates review and certification of the therapy plan).              Referring Provider:  Stone Duvall    Initial Assessment  See Epic Evaluation- Start of Care Date: 08/16/23

## 2024-01-25 ENCOUNTER — THERAPY VISIT (OUTPATIENT)
Dept: PHYSICAL THERAPY | Facility: CLINIC | Age: 39
End: 2024-01-25
Payer: COMMERCIAL

## 2024-01-25 DIAGNOSIS — M25.561 CHRONIC PAIN OF RIGHT KNEE: Primary | ICD-10-CM

## 2024-01-25 DIAGNOSIS — G89.29 CHRONIC PAIN OF RIGHT KNEE: Primary | ICD-10-CM

## 2024-01-25 PROCEDURE — 97530 THERAPEUTIC ACTIVITIES: CPT | Mod: GP | Performed by: STUDENT IN AN ORGANIZED HEALTH CARE EDUCATION/TRAINING PROGRAM

## 2024-01-25 PROCEDURE — 97112 NEUROMUSCULAR REEDUCATION: CPT | Mod: 59 | Performed by: STUDENT IN AN ORGANIZED HEALTH CARE EDUCATION/TRAINING PROGRAM

## 2024-01-25 PROCEDURE — 97110 THERAPEUTIC EXERCISES: CPT | Mod: 59 | Performed by: STUDENT IN AN ORGANIZED HEALTH CARE EDUCATION/TRAINING PROGRAM

## 2024-01-25 ASSESSMENT — ACTIVITIES OF DAILY LIVING (ADL)
LIMPING: I DO NOT HAVE THE SYMPTOM
SIT WITH YOUR KNEE BENT: ACTIVITY IS NOT DIFFICULT
SWELLING: I DO NOT HAVE THE SYMPTOM
STAND: ACTIVITY IS NOT DIFFICULT
KNEEL ON THE FRONT OF YOUR KNEE: ACTIVITY IS MINIMALLY DIFFICULT
HOW_WOULD_YOU_RATE_THE_OVERALL_FUNCTION_OF_YOUR_KNEE_DURING_YOUR_USUAL_DAILY_ACTIVITIES?: NEARLY NORMAL
GIVING WAY, BUCKLING OR SHIFTING OF KNEE: I DO NOT HAVE THE SYMPTOM
SQUAT: ACTIVITY IS MINIMALLY DIFFICULT
RAW_SCORE: 66
AS_A_RESULT_OF_YOUR_KNEE_INJURY,_HOW_WOULD_YOU_RATE_YOUR_CURRENT_LEVEL_OF_DAILY_ACTIVITY?: NEARLY NORMAL
GO UP STAIRS: ACTIVITY IS NOT DIFFICULT
PAIN: I DO NOT HAVE THE SYMPTOM
GO DOWN STAIRS: ACTIVITY IS MINIMALLY DIFFICULT
STIFFNESS: I HAVE THE SYMPTOM BUT IT DOES NOT AFFECT MY ACTIVITY
KNEE_ACTIVITY_OF_DAILY_LIVING_SUM: 66
WEAKNESS: I DO NOT HAVE THE SYMPTOM
RISE FROM A CHAIR: ACTIVITY IS NOT DIFFICULT
KNEE_ACTIVITY_OF_DAILY_LIVING_SCORE: 94.29
WALK: ACTIVITY IS NOT DIFFICULT
HOW_WOULD_YOU_RATE_THE_CURRENT_FUNCTION_OF_YOUR_KNEE_DURING_YOUR_USUAL_DAILY_ACTIVITIES_ON_A_SCALE_FROM_0_TO_100_WITH_100_BEING_YOUR_LEVEL_OF_KNEE_FUNCTION_PRIOR_TO_YOUR_INJURY_AND_0_BEING_THE_INABILITY_TO_PERFORM_ANY_OF_YOUR_USUAL_DAILY_ACTIVITIES?: 90

## 2024-01-25 NOTE — PROGRESS NOTES
01/25/24 0500   Appointment Info   Signing clinician's name / credentials Shauna Toth DPT   Total/Authorized Visits 8 E+T   Visits Used 5   Medical Diagnosis Chronic pain of right knee   PT Tx Diagnosis chronic pain of right knee   Progress Note/Certification   Start of Care Date 09/07/23   Onset of illness/injury or Date of Surgery 08/28/23   Therapy Frequency every other week   Predicted Duration 1 visit   Certification date from 12/28/23   Certification date to 02/22/24   Progress Note Due Date 11/02/23   Progress Note Completed Date 01/25/24   PT Goal 1   Goal Identifier Seated position   Goal Description Pt will sit cross legged on floor without knee pain   Rationale to maximize safety and independence with performance of ADLs and functional tasks;to maximize safety and independence with self cares   Target Date 11/02/23   Date Met 01/25/24   PT Goal 2   Goal Identifier Return to activity   Goal Description pt bud participate in 30 minutes of yoga without pain   Rationale to maximize safety and independence with performance of ADLs and functional tasks;to maximize safety and independence with self cares   Goal Progress has not tested   Target Date 11/02/23   Date Met 11/02/23   Subjective Report   Subjective Report Knee has been feeling much better since his last appointment. able to do all that he needs without knee pain. feels at least 90% back to full fx   Treatment Interventions (PT)   Interventions Therapeutic Procedure/Exercise;Therapeutic Activity;Manual Therapy;Neuromuscular Re-education   Therapeutic Procedure/Exercise   Therapeutic Procedures: strength, endurance, ROM, flexibillity minutes (57048) 12   PTRx Ther Proc 1 standing hip abduction with IR RTB 2 x 12 e   PTRx Ther Proc 1 - Details rev   PTRx Ther Proc 2 hamstring slider DL x 10   PTRx Ther Proc 2 - Details HEP   PTRx Ther Proc 3 BSS x 10 reps   PTRx Ther Proc 4 hamstring slider SL x 10 reps   PTRx Ther Proc 3 - Details HEP   PTRx Ther  "Proc 4 - Details HEP   Therapeutic Activity   Therapeutic Activities: dynamic activities to improve functional performance minutes (51987) 10   Therapeutic Activities Ther Act 2   Ther Act 1 education on HEP, progression of ex's, return to running   Neuromuscular Re-education   Neuromuscular re-ed of mvmt, balance, coord, kinesthetic sense, posture, proprioception minutes (73133) 10   Neuro Re-ed 1 side plank knees with hip abduction 2 x 10 reps e   Neuro Re-ed 2 Lateral step down 8\" box x 10 reps   Neuro Re-ed 2 - Details rev   PTRx Neuro Re-ed 1 side plank with hip abduction x 10 reps e   PTRx Neuro Re-ed 2 lateral band walks x 10 ft e   Education   Learner/Method Patient   Plan   Home program PTRx   Updates to plan of care d/c pt   Total Session Time   Timed Code Treatment Minutes 32   Total Treatment Time (sum of timed and untimed services) 32         [facility]                                                                                   OUTPATIENT PHYSICAL THERAPY    PLAN OF TREATMENT FOR OUTPATIENT REHABILITATION   Patient's Last Name, First Name, MVikash Ba    YOB: 1985   Provider's Name   [facility]   Medical Record No.  [id number]     Onset Date: 08/28/23  Start of Care Date: 09/07/23     Medical Diagnosis:  Chronic pain of right knee      PT Treatment Diagnosis:  chronic pain of right knee Plan of Treatment  Frequency/Duration: every other week/ 1 visit    Certification date from 12/28/23 to 02/22/24         See note for plan of treatment details and functional goals     CHRIS COOPER, PT                         I CERTIFY THE NEED FOR THESE SERVICES FURNISHED UNDER        THIS PLAN OF TREATMENT AND WHILE UNDER MY CARE     (Physician attestation of this document indicates review and certification of the therapy plan).              Referring Provider:  Nasreen Carrasquillo    Initial Assessment  See Epic Evaluation- Start of " Care Date: 09/07/23            DISCHARGE  Reason for Discharge: Patient has met all goals.    Equipment Issued: none    Discharge Plan: Patient to continue home program.    Referring Provider:  Nasreen Carrasquillo

## 2024-02-07 ENCOUNTER — OFFICE VISIT (OUTPATIENT)
Dept: FAMILY MEDICINE | Facility: CLINIC | Age: 39
End: 2024-02-07
Payer: COMMERCIAL

## 2024-02-07 VITALS
OXYGEN SATURATION: 99 % | HEIGHT: 68 IN | BODY MASS INDEX: 18.49 KG/M2 | WEIGHT: 122 LBS | DIASTOLIC BLOOD PRESSURE: 65 MMHG | HEART RATE: 60 BPM | TEMPERATURE: 97.6 F | SYSTOLIC BLOOD PRESSURE: 102 MMHG | RESPIRATION RATE: 16 BRPM

## 2024-02-07 DIAGNOSIS — Z23 NEED FOR INFLUENZA VACCINATION: ICD-10-CM

## 2024-02-07 DIAGNOSIS — L70.0 ACNE VULGARIS: Primary | ICD-10-CM

## 2024-02-07 PROCEDURE — 90471 IMMUNIZATION ADMIN: CPT

## 2024-02-07 PROCEDURE — 99213 OFFICE O/P EST LOW 20 MIN: CPT | Mod: 25

## 2024-02-07 PROCEDURE — 90686 IIV4 VACC NO PRSV 0.5 ML IM: CPT

## 2024-02-07 RX ORDER — BENZOYL PEROXIDE 10 G/100G
GEL TOPICAL DAILY
Qty: 30 G | Refills: 1 | Status: SHIPPED | OUTPATIENT
Start: 2024-02-07

## 2024-02-07 NOTE — PROGRESS NOTES
"  Assessment & Plan     Acne vulgaris  Recommended that he avoid wearing winter hats for the time being and that he exfoliate the dead skin on his forehead with an exfoliating sponge followed by benzoyl peroxide and moisturizing cream. Suggested that he wash his face immediately after sweating, whether it be in the sauna or from a work out. Counseled him on using benzoyl peroxide daily or every other day for the next month and to return to clinic if it does not improve.   - benzoyl peroxide (ACNE-CLEAR) 10 % external gel; Apply topically daily    Need for influenza vaccination  -     INFLUENZA VACCINE >6 MONTHS (AFLURIA/FLUZONE)    Return if symptoms worsen or fail to improve.    Subjective   Vikash is a 38 year old, presenting for the following health issues:  Derm Problem (Reaction on forehead. Appears after applying lotion. Ongoing issue for 6 months.)        2/7/2024     8:29 AM   Additional Questions   Roomed by Daysi   Accompanied by self     HPI   - Presents to clinic for a \"rash\" on forehead that began 6 months ago  - States that it has been intermittently appearing over the past 6 months associated with dry skin  - States that moisturizing has not been helpful, nor has changing his soap from Dr. Lr's to a lighter moisturizing soap  - Reports that it has been improving over the past month or so; the only change is that he is no longer wearing his winter hat because he lost it  - Says that he does not exfoliate, but he once tried to rub off the dead skin while he was in the sauna and the acne worsened  - No history of acne  - Denies pruritus and pain    Review of Systems  Constitutional, neuro, ENT, endocrine, pulmonary, cardiac, gastrointestinal, genitourinary, musculoskeletal, integument and psychiatric systems are negative, except as otherwise noted.      Objective    /65   Pulse 60   Temp 97.6  F (36.4  C) (Oral)   Resp 16   Ht 1.727 m (5' 8\")   Wt 55.3 kg (122 lb)   SpO2 99%   BMI 18.55 " kg/m    Body mass index is 18.55 kg/m .  Physical Exam  Vitals reviewed.   Constitutional:       Appearance: Normal appearance.   HENT:      Head: Normocephalic and atraumatic.   Eyes:      Extraocular Movements: Extraocular movements intact.      Conjunctiva/sclera: Conjunctivae normal.   Pulmonary:      Effort: Pulmonary effort is normal.   Musculoskeletal:         General: Normal range of motion.   Skin:     General: Skin is warm and dry.      Comments: Numerous mildly erythematous pustules on forehead. Non-tender to palpation.   Neurological:      Mental Status: He is alert and oriented to person, place, and time.   Psychiatric:         Mood and Affect: Mood normal.         Behavior: Behavior normal.         Thought Content: Thought content normal.         Judgment: Judgment normal.        Signed Electronically by: Nasreen Carrasquillo DO, MPH

## 2024-02-07 NOTE — PROGRESS NOTES
Preceptor Attestation:    I discussed the patient with the resident and evaluated the patient in person. I have verified the content of the note, which accurately reflects my assessment of the patient and the plan of care.   Supervising Physician:  Calvin Jensen MD.

## 2024-02-07 NOTE — PATIENT INSTRUCTIONS
Patient Education   Here is the plan from today's visit    1. Acne vulgaris  Remove dead skin with an exfoliative sponge. Wet the sponge, rub on affected skin, wash with benzoyl peroxide, then put moisturizer on. Use the benzoyl peroxide once a day. After sweating, wash face after every episode of sweating with either water or the once-a-day benzoyl peroxide  - benzoyl peroxide (ACNE-CLEAR) 10 % external gel; Apply topically daily  Dispense: 30 g; Refill: 1    Please call or return to clinic if your symptoms don't go away.    Follow up plan  Return if symptoms worsen or fail to improve.    Thank you for coming to [facility] today.  Lab Testing:  **If you had lab testing today and your results are reassuring or normal they will be mailed to you or sent through [employer] within 7 days.   **If the lab tests need quick action we will call you with the results.  **If you are having labs done on a different day, please call [phone] to schedule at [facility] or [phone] for other [facility] locations. Labs do not offer walk-in appointments.  The phone number we will call with results is # [phone] (home) . If this is not the best number please call our clinic and change the number.  Medication Refills:  If you need any refills please call your pharmacy and they will contact us.   If you need to  your refill at a new pharmacy, please contact the new pharmacy directly. The new pharmacy will help you get your medications transferred faster.   Scheduling:  If you have any concerns about today's visit or wish to schedule another appointment please call our office during normal business hours [phone] (8-5:00 M-F). If you can no longer make a scheduled visit, please cancel via [employer] or call us to cancel.   If a referral was made to an [facility] specialty provider and you do not get a call from central scheduling, please refer to directions on your visit summary or call  our office during normal business hours for assistance.   If a Mammogram was ordered for you at the [facility] call [phone] to schedule or change your appointment.  If you had an XRay/CT/Ultrasound/MRI ordered the number is [phone] to schedule or change your radiology appointment.   [facility] has limited ultrasound appointments available on Wednesdays, if you would like your ultrasound at [facility], please call [phone] to schedule.   Medical Concerns:  If you have urgent medical concerns please call [phone] at any time of the day.    Nasreen Carrasquillo, DO

## 2024-07-12 ENCOUNTER — TELEPHONE (OUTPATIENT)
Dept: FAMILY MEDICINE | Facility: CLINIC | Age: 39
End: 2024-07-12
Payer: COMMERCIAL

## 2024-07-12 NOTE — TELEPHONE ENCOUNTER
RN COVID TREATMENT VISIT  07/12/24      The patient has been triaged and does not require a higher level of care.    Vikash Chidi  39 year old  Current weight? 122    Has the patient been seen by a primary care provider at an [facility] or [facility] within the past two years? Yes.   Have you been in close proximity to/do you have a known exposure to a person with a confirmed case of influenza?     Patient does not qualify for Paxlovid, patient tested positive on 7/5/24    Gave patient recommendations for home care    Azucena Shukla RN

## 2024-07-12 NOTE — TELEPHONE ENCOUNTER
COVID Positive/Requesting COVID treatment    Patient is positive for COVID and requesting treatment options.    Date of positive COVID test (PCR or at home)? 7/5/24; home  Current COVID symptoms: shortness of breath or difficulty breathing, fatigue, and nausea or vomiting  Date COVID symptoms began: 7/4/24    Message should be routed to clinic RN pool. Best phone number to use for call back: [phone]

## 2024-07-13 ENCOUNTER — HOSPITAL ENCOUNTER (EMERGENCY)
Facility: CLINIC | Age: 39
Discharge: HOME | End: 2024-07-13
Attending: EMERGENCY MEDICINE | Admitting: EMERGENCY MEDICINE
Payer: COMMERCIAL

## 2024-07-13 ENCOUNTER — APPOINTMENT (OUTPATIENT)
Dept: GENERAL RADIOLOGY | Facility: CLINIC | Age: 39
End: 2024-07-13
Attending: EMERGENCY MEDICINE
Payer: COMMERCIAL

## 2024-07-13 ENCOUNTER — APPOINTMENT (OUTPATIENT)
Dept: CT IMAGING | Facility: CLINIC | Age: 39
End: 2024-07-13
Attending: EMERGENCY MEDICINE
Payer: COMMERCIAL

## 2024-07-13 VITALS
SYSTOLIC BLOOD PRESSURE: 108 MMHG | DIASTOLIC BLOOD PRESSURE: 74 MMHG | RESPIRATION RATE: 16 BRPM | HEART RATE: 85 BPM | HEIGHT: 67 IN | TEMPERATURE: 98.2 F | OXYGEN SATURATION: 99 % | WEIGHT: 118 LBS | BODY MASS INDEX: 18.52 KG/M2

## 2024-07-13 DIAGNOSIS — U07.1 COVID-19 VIRUS INFECTION: ICD-10-CM

## 2024-07-13 DIAGNOSIS — R07.89 CHEST TIGHTNESS: ICD-10-CM

## 2024-07-13 DIAGNOSIS — R06.02 SHORTNESS OF BREATH: ICD-10-CM

## 2024-07-13 LAB
ANION GAP SERPL CALCULATED.3IONS-SCNC: 10 MMOL/L (ref 7–15)
BASOPHILS # BLD AUTO: 0 10E3/UL (ref 0–0.2)
BASOPHILS NFR BLD AUTO: 0 %
BUN SERPL-MCNC: 6.5 MG/DL (ref 6–20)
CALCIUM SERPL-MCNC: 9 MG/DL (ref 8.6–10)
CHLORIDE SERPL-SCNC: 93 MMOL/L (ref 98–107)
CREAT SERPL-MCNC: 0.72 MG/DL (ref 0.67–1.17)
D DIMER PPP FEU-MCNC: 0.62 UG/ML FEU (ref 0–0.5)
DEPRECATED HCO3 PLAS-SCNC: 28 MMOL/L (ref 22–29)
EGFRCR SERPLBLD CKD-EPI 2021: >90 ML/MIN/1.73M2
EOSINOPHIL # BLD AUTO: 0 10E3/UL (ref 0–0.7)
EOSINOPHIL NFR BLD AUTO: 1 %
ERYTHROCYTE [DISTWIDTH] IN BLOOD BY AUTOMATED COUNT: 11.7 % (ref 10–15)
GLUCOSE SERPL-MCNC: 119 MG/DL (ref 70–99)
GROUP A STREP BY PCR: NOT DETECTED
HCT VFR BLD AUTO: 39.9 % (ref 40–53)
HGB BLD-MCNC: 14.1 G/DL (ref 13.3–17.7)
IMM GRANULOCYTES # BLD: 0 10E3/UL
IMM GRANULOCYTES NFR BLD: 0 %
LYMPHOCYTES # BLD AUTO: 0.4 10E3/UL (ref 0.8–5.3)
LYMPHOCYTES NFR BLD AUTO: 13 %
MCH RBC QN AUTO: 31.3 PG (ref 26.5–33)
MCHC RBC AUTO-ENTMCNC: 35.3 G/DL (ref 31.5–36.5)
MCV RBC AUTO: 89 FL (ref 78–100)
MONOCYTES # BLD AUTO: 0.4 10E3/UL (ref 0–1.3)
MONOCYTES NFR BLD AUTO: 11 %
NEUTROPHILS # BLD AUTO: 2.5 10E3/UL (ref 1.6–8.3)
NEUTROPHILS NFR BLD AUTO: 75 %
NRBC # BLD AUTO: 0 10E3/UL
NRBC BLD AUTO-RTO: 0 /100
NT-PROBNP SERPL-MCNC: <36 PG/ML (ref 0–450)
PLATELET # BLD AUTO: 215 10E3/UL (ref 150–450)
POTASSIUM SERPL-SCNC: 3.8 MMOL/L (ref 3.4–5.3)
RBC # BLD AUTO: 4.51 10E6/UL (ref 4.4–5.9)
SARS-COV-2 RNA RESP QL NAA+PROBE: POSITIVE
SODIUM SERPL-SCNC: 131 MMOL/L (ref 135–145)
TROPONIN T SERPL HS-MCNC: <6 NG/L
WBC # BLD AUTO: 3.4 10E3/UL (ref 4–11)

## 2024-07-13 PROCEDURE — 99285 EMERGENCY DEPT VISIT HI MDM: CPT | Mod: 25 | Performed by: EMERGENCY MEDICINE

## 2024-07-13 PROCEDURE — 85379 FIBRIN DEGRADATION QUANT: CPT | Performed by: EMERGENCY MEDICINE

## 2024-07-13 PROCEDURE — 71046 X-RAY EXAM CHEST 2 VIEWS: CPT

## 2024-07-13 PROCEDURE — 93005 ELECTROCARDIOGRAM TRACING: CPT | Mod: RTG

## 2024-07-13 PROCEDURE — 87651 STREP A DNA AMP PROBE: CPT

## 2024-07-13 PROCEDURE — 93010 ELECTROCARDIOGRAM REPORT: CPT | Performed by: EMERGENCY MEDICINE

## 2024-07-13 PROCEDURE — 85025 COMPLETE CBC W/AUTO DIFF WBC: CPT | Performed by: EMERGENCY MEDICINE

## 2024-07-13 PROCEDURE — 87635 SARS-COV-2 COVID-19 AMP PRB: CPT

## 2024-07-13 PROCEDURE — 84484 ASSAY OF TROPONIN QUANT: CPT | Performed by: EMERGENCY MEDICINE

## 2024-07-13 PROCEDURE — 99285 EMERGENCY DEPT VISIT HI MDM: CPT | Performed by: EMERGENCY MEDICINE

## 2024-07-13 PROCEDURE — 250N000013 HC RX MED GY IP 250 OP 250 PS 637: Performed by: EMERGENCY MEDICINE

## 2024-07-13 PROCEDURE — 250N000009 HC RX 250: Performed by: EMERGENCY MEDICINE

## 2024-07-13 PROCEDURE — 83880 ASSAY OF NATRIURETIC PEPTIDE: CPT | Performed by: EMERGENCY MEDICINE

## 2024-07-13 PROCEDURE — 250N000011 HC RX IP 250 OP 636: Performed by: EMERGENCY MEDICINE

## 2024-07-13 PROCEDURE — 93005 ELECTROCARDIOGRAM TRACING: CPT | Performed by: EMERGENCY MEDICINE

## 2024-07-13 PROCEDURE — 80048 BASIC METABOLIC PNL TOTAL CA: CPT | Performed by: EMERGENCY MEDICINE

## 2024-07-13 PROCEDURE — 71275 CT ANGIOGRAPHY CHEST: CPT

## 2024-07-13 PROCEDURE — 36415 COLL VENOUS BLD VENIPUNCTURE: CPT | Performed by: EMERGENCY MEDICINE

## 2024-07-13 RX ORDER — HYDROXYZINE HYDROCHLORIDE 25 MG/1
25-50 TABLET, FILM COATED ORAL 2 TIMES DAILY PRN
Qty: 10 TABLET | Refills: 0 | Status: SHIPPED | OUTPATIENT
Start: 2024-07-13 | End: 2024-07-14

## 2024-07-13 RX ORDER — LORAZEPAM 0.5 MG/1
0.5 TABLET ORAL ONCE
Status: COMPLETED | OUTPATIENT
Start: 2024-07-13 | End: 2024-07-13

## 2024-07-13 RX ORDER — IOPAMIDOL 755 MG/ML
100 INJECTION, SOLUTION INTRAVASCULAR ONCE
Status: COMPLETED | OUTPATIENT
Start: 2024-07-13 | End: 2024-07-13

## 2024-07-13 RX ADMIN — LORAZEPAM 0.5 MG: 0.5 TABLET ORAL at 09:11

## 2024-07-13 RX ADMIN — IOPAMIDOL 50 ML: 755 INJECTION, SOLUTION INTRAVENOUS at 13:01

## 2024-07-13 RX ADMIN — SODIUM CHLORIDE 72 ML: 9 INJECTION, SOLUTION INTRAVENOUS at 13:02

## 2024-07-13 ASSESSMENT — ACTIVITIES OF DAILY LIVING (ADL)
ADLS_ACUITY_SCORE: 35
ADLS_ACUITY_SCORE: 33
ADLS_ACUITY_SCORE: 35

## 2024-07-13 ASSESSMENT — ENCOUNTER SYMPTOMS: SHORTNESS OF BREATH: 1

## 2024-07-13 NOTE — ED TRIAGE NOTES
Getting over covid, having worse fatigue. Feeling more short of breath. Getting up walking to the bathroom feels he has to sit and catch his breath after.  Positive covid test last Friday. Tested this morning and was negative. No throat pain. Was febrile last weekend.      Triage Assessment (Adult)       Row Name 07/13/24 0804          Triage Assessment    Airway WDL WDL        Respiratory WDL    Respiratory WDL WDL        Skin Circulation/Temperature WDL    Skin Circulation/Temperature WDL WDL        Cardiac WDL    Cardiac WDL WDL        Peripheral/Neurovascular WDL    Peripheral Neurovascular WDL WDL        Cognitive/Neuro/Behavioral WDL    Cognitive/Neuro/Behavioral WDL WDL

## 2024-07-13 NOTE — ED PROVIDER NOTES
ED Provider Note  [facility]      History     Chief Complaint   Patient presents with    Shortness of Breath     Getting over covid, having worse fatigue. Feeling more short of breath. Getting up walking to the bathroom feels he has to sit and catch his breath after.  Positive covid test last Friday. Tested this morning and was negative. No throat pain. Was febrile last weekend.          Shortness of Breath    Vikash Murillo is a 39-year-old man with history of anxiety presenting with shortness of breath.  He states he was diagnosed with COVID 19 infection 8 days ago.  Since then his symptoms have resolved, however, as of yesterday he is having difficulty breathing with a even slight exertion.  Some chest tightness with those symptoms as well.  Currently he is symptom-free.  No fevers.  He tested himself for COVID-19 this morning and the test was negative.  He does not smoke.  No history of diabetes or cholesterol.  No history of CAD.  No nausea/vomiting, headache, neck pain, abdominal pain.  No other concerns or complaints.      This part of the medical record was transcribed by Maxime Roland, Medical Scribe, from a dictation done by Obi Barrow MD.     Past Medical History  No past medical history on file.  Past Surgical History:   Procedure Laterality Date    HC PSYCHOTHERAPY W PATIENT 38-52 MINUTES  5/12/2022    OK PSYCHOTHERAPY W PATIENT 38-52 MINUTES  4/14/2022    OK PSYCHOTHERAPY W PATIENT 38-52 MINUTES  4/21/2022     benzoyl peroxide (ACNE-CLEAR) 10 % external gel  hydrOXYzine HCl (ATARAX) 25 MG tablet      No Known Allergies  Family History  Family History   Problem Relation Age of Onset    Arthritis Mother     Diabetes Father     Lung Cancer Father     Heart Disease Maternal Grandfather     Hyperlipidemia Brother      Social History   Social History     Tobacco Use    Smoking status: Never    Smokeless tobacco: Never   Vaping Use    Vaping status: Never Used   Substance  "Use Topics    Alcohol use: Not Currently    Drug use: Not Currently      Past medical history, past surgical history, medications, allergies, family history, and social history were reviewed with the patient. No additional pertinent items.   A medically appropriate review of systems was performed with pertinent positives and negatives noted in the HPI, and all other systems negative.    Physical Exam   BP: 108/74  Pulse: 85  Temp: 98.2  F (36.8  C)  Resp: 16  Height: 170.2 cm (5' 7\")  Weight: 53.5 kg (118 lb)  SpO2: 99 %  Physical Exam  Vitals and nursing note reviewed.   Constitutional:       General: He is not in acute distress.     Appearance: Normal appearance. He is normal weight. He is not ill-appearing, toxic-appearing or diaphoretic.   HENT:      Head: Normocephalic and atraumatic.   Eyes:      General: No scleral icterus.     Extraocular Movements: Extraocular movements intact.      Conjunctiva/sclera: Conjunctivae normal.      Pupils: Pupils are equal, round, and reactive to light.   Cardiovascular:      Rate and Rhythm: Normal rate.      Heart sounds: Normal heart sounds.   Pulmonary:      Effort: Pulmonary effort is normal. No respiratory distress.      Breath sounds: Normal breath sounds. No wheezing, rhonchi or rales.   Abdominal:      Palpations: Abdomen is soft.      Tenderness: There is no abdominal tenderness.   Musculoskeletal:         General: No tenderness. Normal range of motion.      Cervical back: Normal range of motion and neck supple.      Right lower leg: No edema.      Left lower leg: No edema.   Skin:     General: Skin is warm.      Findings: No rash.   Neurological:      General: No focal deficit present.      Mental Status: He is alert and oriented to person, place, and time.   Psychiatric:         Mood and Affect: Mood normal.         Behavior: Behavior normal.         Thought Content: Thought content normal.         Judgment: Judgment normal.           ED Course, Procedures, & Data "      Procedures            EKG Interpretation:      Interpreted by Obi Barrow MD  Time reviewed: 8:51 AM  Symptoms at time of EKG: Shortness of breath  Rhythm: normal sinus   Rate: normal  Axis: normal  Ectopy: none  Conduction: normal  ST Segments/ T Waves: No ST-T wave changes  Q Waves: none  Comparison to prior: No old EKG available    Clinical Impression: NSR, no acute ischemia           Results for orders placed or performed during the hospital encounter of 07/13/24   XR Chest 2 Views     Status: None    Narrative    EXAM: XR CHEST 2 VIEWS  LOCATION: [facility]  DATE: 7/13/2024    INDICATION: SOB  COMPARISON: None.      Impression    IMPRESSION: Negative chest.   CT Chest Pulmonary Embolism w Contrast     Status: None    Narrative    EXAM: CT CHEST PULMONARY EMBOLISM W CONTRAST  LOCATION: [facility]  DATE: 7/13/2024    INDICATION: Shortness of breath, elevated D dimer, recent COVID 19 infection.; Male sex; No prior imaging in the last 24 hours; Pulmonary Embolism Rule Out Criteria (PERC) score not > 0  COMPARISON: None.  TECHNIQUE: CT chest pulmonary angiogram during arterial phase injection of IV contrast. Multiplanar reformats and MIP reconstructions were performed. Dose reduction techniques were used.   CONTRAST: 50 mL Isovue-370    FINDINGS:  ANGIOGRAM CHEST: Pulmonary arteries are normal caliber and negative for pulmonary emboli. Thoracic aorta is negative for dissection. No CT evidence of right heart strain.    LUNGS AND PLEURA: Calcified granuloma right upper lobe.    MEDIASTINUM/AXILLAE: Normal.    CORONARY ARTERY CALCIFICATION: None.    UPPER ABDOMEN: Normal.    MUSCULOSKELETAL: Normal.      Impression    IMPRESSION:  1.  No pulmonary embolus, aortic dissection, or aneurysm.   Symptomatic COVID-19 Virus (Coronavirus) by PCR Nasopharyngeal     Status: Abnormal    Specimen: Nasopharyngeal; Swab   Result Value  Ref Range    SARS CoV2 PCR Positive (A) Negative    Narrative    Testing was performed using the Xpert Xpress SARS-CoV-2 Assay on the [employer] Gene-Xpert Instrument Systems. Additional information about this Emergency Use Authorization (EUA) assay can be found via the Lab Guide. This test should be ordered for the detection of SARS-CoV-2 in individuals who meet SARS-CoV-2 clinical and/or epidemiological criteria as well as from individuals without symptoms or other reasons to suspect COVID-19. Test performance for asymptomatic patients has only been established in anterior nasal swab specimens. This test is for in vitro diagnostic use under the FDA EUA for laboratories certified under CLIA to perform high complexity testing. This test has not been FDA cleared or approved. A negative result does not rule out the presence of PCR inhibitors in the specimen or target RNA concentration below the limit of detection for the assay. The possibility of a false negative should be considered if the patient's recent exposure or clinical presentation suggests COVID-19. This test was validated by the [facility]. This laboratory is certified under the Clinical Laboratory Improvement Amendments (CLIA) as qualified to perform high complexity laboratory testing.     D dimer quantitative     Status: Abnormal   Result Value Ref Range    D-Dimer Quantitative 0.62 (H) 0.00 - 0.50 ug/mL FEU    Narrative    This D-dimer assay is intended for use in conjunction with a clinical pretest probability assessment model to exclude pulmonary embolism (PE) and deep venous thrombosis (DVT) in outpatients suspected of PE or DVT. The cut-off value is 0.50 ug/mL FEU.   Basic metabolic panel     Status: Abnormal   Result Value Ref Range    Sodium 131 (L) 135 - 145 mmol/L    Potassium 3.8 3.4 - 5.3 mmol/L    Chloride 93 (L) 98 - 107 mmol/L    Carbon Dioxide (CO2) 28 22 - 29 mmol/L    Anion Gap 10 7 - 15 mmol/L    Urea  Nitrogen 6.5 6.0 - 20.0 mg/dL    Creatinine 0.72 0.67 - 1.17 mg/dL    GFR Estimate >90 >60 mL/min/1.73m2    Calcium 9.0 8.6 - 10.0 mg/dL    Glucose 119 (H) 70 - 99 mg/dL   Troponin T, High Sensitivity     Status: Normal   Result Value Ref Range    Troponin T, High Sensitivity <6 <=22 ng/L   Nt probnp inpatient (BNP)     Status: Normal   Result Value Ref Range    N terminal Pro BNP Inpatient <36 0 - 450 pg/mL   CBC with platelets and differential     Status: Abnormal   Result Value Ref Range    WBC Count 3.4 (L) 4.0 - 11.0 10e3/uL    RBC Count 4.51 4.40 - 5.90 10e6/uL    Hemoglobin 14.1 13.3 - 17.7 g/dL    Hematocrit 39.9 (L) 40.0 - 53.0 %    MCV 89 78 - 100 fL    MCH 31.3 26.5 - 33.0 pg    MCHC 35.3 31.5 - 36.5 g/dL    RDW 11.7 10.0 - 15.0 %    Platelet Count 215 150 - 450 10e3/uL    % Neutrophils 75 %    % Lymphocytes 13 %    % Monocytes 11 %    % Eosinophils 1 %    % Basophils 0 %    % Immature Granulocytes 0 %    NRBCs per 100 WBC 0 <1 /100    Absolute Neutrophils 2.5 1.6 - 8.3 10e3/uL    Absolute Lymphocytes 0.4 (L) 0.8 - 5.3 10e3/uL    Absolute Monocytes 0.4 0.0 - 1.3 10e3/uL    Absolute Eosinophils 0.0 0.0 - 0.7 10e3/uL    Absolute Basophils 0.0 0.0 - 0.2 10e3/uL    Absolute Immature Granulocytes 0.0 <=0.4 10e3/uL    Absolute NRBCs 0.0 10e3/uL   Group A Streptococcus PCR Throat Swab     Status: Normal    Specimen: Throat; Swab   Result Value Ref Range    Group A strep by PCR Not Detected Not Detected    Narrative    The Xpert Xpress Strep A test, performed on the [employer], is a rapid, qualitative in vitro diagnostic test for the detection of Streptococcus pyogenes (Group A ß-hemolytic Streptococcus, Strep A) in throat swab specimens from patients with signs and symptoms of pharyngitis. The Xpert Xpress Strep A test can be used as an aid in the diagnosis of Group A Streptococcal pharyngitis. The assay is not intended to monitor treatment for Group A Streptococcus infections. The Xpert  Xpress Strep A test utilizes an automated real-time polymerase chain reaction (PCR) to detect Streptococcus pyogenes DNA.   CBC with platelets differential     Status: Abnormal    Narrative    The following orders were created for panel order CBC with platelets differential.  Procedure                               Abnormality         Status                     ---------                               -----------         ------                     CBC with platelets and d...[[id number]]  Abnormal            Final result                 Please view results for these tests on the individual orders.     Medications   LORazepam (ATIVAN) tablet 0.5 mg (0.5 mg Oral $Given 7/13/24 0911)   iopamidol (ISOVUE-370) solution 100 mL (50 mLs Intravenous $Given 7/13/24 1301)   sodium chloride 0.9 % bag 100mL for CT scan flush use (72 mLs Intravenous $Given 7/13/24 1302)     Labs Ordered and Resulted from Time of ED Arrival to Time of ED Departure   COVID-19 VIRUS (CORONAVIRUS) BY PCR - Abnormal       Result Value    SARS CoV2 PCR Positive (*)    D DIMER QUANTITATIVE - Abnormal    D-Dimer Quantitative 0.62 (*)    BASIC METABOLIC PANEL - Abnormal    Sodium 131 (*)     Potassium 3.8      Chloride 93 (*)     Carbon Dioxide (CO2) 28      Anion Gap 10      Urea Nitrogen 6.5      Creatinine 0.72      GFR Estimate >90      Calcium 9.0      Glucose 119 (*)    CBC WITH PLATELETS AND DIFFERENTIAL - Abnormal    WBC Count 3.4 (*)     RBC Count 4.51      Hemoglobin 14.1      Hematocrit 39.9 (*)     MCV 89      MCH 31.3      MCHC 35.3      RDW 11.7      Platelet Count 215      % Neutrophils 75      % Lymphocytes 13      % Monocytes 11      % Eosinophils 1      % Basophils 0      % Immature Granulocytes 0      NRBCs per 100 WBC 0      Absolute Neutrophils 2.5      Absolute Lymphocytes 0.4 (*)     Absolute Monocytes 0.4      Absolute Eosinophils 0.0      Absolute Basophils 0.0      Absolute Immature Granulocytes 0.0      Absolute NRBCs 0.0      TROPONIN T, HIGH SENSITIVITY - Normal    Troponin T, High Sensitivity <6     NT PROBNP INPATIENT - Normal    N terminal Pro BNP Inpatient <36     GROUP A STREPTOCOCCUS PCR THROAT SWAB - Normal    Group A strep by PCR Not Detected       CT Chest Pulmonary Embolism w Contrast   Final Result   IMPRESSION:   1.  No pulmonary embolus, aortic dissection, or aneurysm.      XR Chest 2 Views   Final Result   IMPRESSION: Negative chest.             Critical care was not performed.     Medical Decision Making  The patient's presentation was of high complexity (an acute health issue posing potential threat to life or bodily function).    The patient's evaluation involved:  review of external note(s) from 1 sources (see separate area of note for details)  review of 3+ test result(s) ordered prior to this encounter (see separate area of note for details)  ordering and/or review of 3+ test(s) in this encounter (see separate area of note for details)  independent interpretation of testing performed by another health professional (see separate area of note for details)    The patient's management necessitated high risk (a decision regarding hospitalization).    Assessment & Plan    39-year-old man presenting with shortness of breath.  Differential diagnosis: Pneumonia, post-COVID syndrome, ACS, pulmonary embolus, myocarditis, CHF.    After thorough history physical exam patient peers to be no acute distress.  I will obtain chest x-ray, EKG, and laboratory studies for further diagnostic evaluation.  He agrees with the plan.    Laboratory studies returned with no leukocytosis, however, there is some leukopenia of 3400.  There is no anemia, hemoglobin is normal at 14.1.  Electrolytes show no evidence of dehydration, creatinine is normal 0.72.  There is slight hyponatremia with sodium of 131.  D-dimer was elevated 0.62.  Troponin is undetectable.  BNP is undetectable.  EKG showed no acute ischemia.  I highly doubt ACS.  COVID-19 test  is positive.  CT angiogram of the chest was done which showed no evidence of pulmonary embolus or pneumonia.  I reviewed the study and I read the radiology report.  Also reviewed his chest x-ray and I read the radiology report; no evidence of pneumothorax.  At this point patient stable for discharge with outpatient follow-up.  He agrees with the plan.  He will return if his symptoms worsen.    I have reviewed the nursing notes. I have reviewed the findings, diagnosis, plan and need for follow up with the patient.    Discharge Medication List as of 7/13/2024  2:00 PM          Final diagnoses:   Shortness of breath   Chest tightness   COVID-19 virus infection       Obi Barrow MD  [facility] EMERGENCY DEPARTMENT  7/13/2024     Obi Barrow MD  07/13/24 1510

## 2024-07-13 NOTE — DISCHARGE INSTRUCTIONS
Please make an appointment to follow up with Your Primary Care Provider in 2-3 days if you have any concerns.  If your symptoms worsen please come back to the emergency department.

## 2024-07-14 ENCOUNTER — HEALTH MAINTENANCE LETTER (OUTPATIENT)
Age: 39
End: 2024-07-14

## 2024-07-14 LAB
ATRIAL RATE - MUSE: 65 BPM
DIASTOLIC BLOOD PRESSURE - MUSE: NORMAL MMHG
INTERPRETATION ECG - MUSE: NORMAL
P AXIS - MUSE: 85 DEGREES
PR INTERVAL - MUSE: 158 MS
QRS DURATION - MUSE: 108 MS
QT - MUSE: 402 MS
QTC - MUSE: 418 MS
R AXIS - MUSE: 98 DEGREES
SYSTOLIC BLOOD PRESSURE - MUSE: NORMAL MMHG
T AXIS - MUSE: 64 DEGREES
VENTRICULAR RATE- MUSE: 65 BPM

## 2024-07-14 RX ORDER — HYDROXYZINE HYDROCHLORIDE 25 MG/1
25-50 TABLET, FILM COATED ORAL 2 TIMES DAILY PRN
Qty: 10 TABLET | Refills: 0 | Status: SHIPPED | OUTPATIENT
Start: 2024-07-14 | End: 2024-08-26

## 2024-07-15 ENCOUNTER — OFFICE VISIT (OUTPATIENT)
Dept: FAMILY MEDICINE | Facility: CLINIC | Age: 39
End: 2024-07-15
Payer: COMMERCIAL

## 2024-07-15 VITALS
TEMPERATURE: 98.2 F | HEART RATE: 76 BPM | DIASTOLIC BLOOD PRESSURE: 68 MMHG | OXYGEN SATURATION: 100 % | RESPIRATION RATE: 14 BRPM | SYSTOLIC BLOOD PRESSURE: 100 MMHG

## 2024-07-15 DIAGNOSIS — F33.1 MODERATE EPISODE OF RECURRENT MAJOR DEPRESSIVE DISORDER (H): ICD-10-CM

## 2024-07-15 DIAGNOSIS — E87.1 HYPONATREMIA: ICD-10-CM

## 2024-07-15 DIAGNOSIS — D72.810 LYMPHOPENIA: ICD-10-CM

## 2024-07-15 DIAGNOSIS — F41.1 GENERALIZED ANXIETY DISORDER: Primary | ICD-10-CM

## 2024-07-15 DIAGNOSIS — F41.0 PANIC DISORDER WITHOUT AGORAPHOBIA: ICD-10-CM

## 2024-07-15 DIAGNOSIS — R53.83 FATIGUE, UNSPECIFIED TYPE: ICD-10-CM

## 2024-07-15 PROCEDURE — 99214 OFFICE O/P EST MOD 30 MIN: CPT | Mod: GC | Performed by: STUDENT IN AN ORGANIZED HEALTH CARE EDUCATION/TRAINING PROGRAM

## 2024-07-15 RX ORDER — PROPRANOLOL HYDROCHLORIDE 10 MG/1
10 TABLET ORAL 3 TIMES DAILY PRN
Qty: 90 TABLET | Refills: 0 | Status: SHIPPED | OUTPATIENT
Start: 2024-07-15 | End: 2024-08-26

## 2024-07-15 RX ORDER — FLUOXETINE 10 MG/1
10 CAPSULE ORAL DAILY
Qty: 30 CAPSULE | Refills: 0 | Status: SHIPPED | OUTPATIENT
Start: 2024-07-15 | End: 2024-08-21

## 2024-07-15 ASSESSMENT — PATIENT HEALTH QUESTIONNAIRE - PHQ9
SUM OF ALL RESPONSES TO PHQ QUESTIONS 1-9: 11
10. IF YOU CHECKED OFF ANY PROBLEMS, HOW DIFFICULT HAVE THESE PROBLEMS MADE IT FOR YOU TO DO YOUR WORK, TAKE CARE OF THINGS AT HOME, OR GET ALONG WITH OTHER PEOPLE: VERY DIFFICULT
SUM OF ALL RESPONSES TO PHQ QUESTIONS 1-9: 11

## 2024-07-15 NOTE — Clinical Note
Unfortunately I forgot to have him get labs! I ordered them for next time. He's coming in 8/1 for a follow up and I told him he could make a lab appt or just wait. I added on an HIV test too. I messaged him to tell him all of this and asked whether he'd prefer to do a lab appointment beforehand or just wait so we'll see what he says. I asked him about potential HIV exposures so that could be useful info too. Sorry again about that!

## 2024-07-15 NOTE — PROGRESS NOTES
Preceptor Attestation:   Patient seen, evaluated and discussed with the resident. I have verified the content of the note, which accurately reflects my assessment of the patient and the plan of care.   Supervising Physician:  Stone Duvall MD         3/31/2022    10:28 AM 7/15/2024    10:13 AM   PHQ   PHQ-9 Total Score 6 11   Q9: Thoughts of better off dead/self-harm past 2 weeks Not at all Not at all          3/31/2022    10:27 AM 8/18/2022     2:45 PM   NICA-7 SCORE   Total Score 6 (mild anxiety) 9 (mild anxiety)   Total Score 6 9        Answers submitted by the patient for this visit:  Patient Health Questionnaire (Submitted on 7/15/2024)  If you checked off any problems, how difficult have these problems made it for you to do your work, take care of things at home, or get along with other people?: Very difficult  PHQ9 TOTAL SCORE: 11

## 2024-07-15 NOTE — PROGRESS NOTES
Assessment & Plan     Generalized anxiety disorder  Panic disorder without agoraphobia  Moderate episode of recurrent major depressive disorder (H)  Fatigue, unspecified type  Vikash presents in the context of having visited the ED two days ago for extreme fatigue that had started when he was diagnosed with COVID 11 days ago and worsened since then. He has a lifelong history of periods of low motivation, sadness, lack of interest in activities he once enjoyed, loneliness with sleep disturbances, appetite changes, and fatigue and is currently experiencing one of these periods (although his fatigue has much improved since then). He denies ever having had SI. He was been worked up for several medical causes of fatigue at the ED such as heart failure and PE (see ED note from 7/13 for more information) and these were ruled out. Notably, Na was 131 and WBC was 3.4 in ED with wnl Hgb and platelets. Given this and additional context above, additional differentials include hypothyroidism, malnutrition, HIV and less likely blood cancer or other disorder.   Psychiatric differential includes generalized anxiety disorder with panic disorder given that anxiety is interfering with his functioning and causing short episodes of intense anxiety with chest tightness and pounding of heartbeat in chest. Low concern for BPAD given absence of chao/hypomania symptoms. Concern for MDD given low motivation, loneliness and additional symptoms above.   Prescribed prozac as well as short courses of propranolol and ativan (to try only after propranolol if it is not effective). Reviewed common side effects; he is particularly concerned about initial anxiety after starting selective serotonin reuptake inhibitor so will follow up on this next visit.  Will obtain TSH, CBC, CMP, HIV test at next visit to rule out additional medical differentials described above or have patient return sooner for lab visit if he is amenable.  - FLUoxetine (PROZAC) 10  "MG capsule  Dispense: 30 capsule; Refill: 0  - propranolol (INDERAL) 10 MG tablet  Dispense: 90 tablet; Refill: 0  - LORazepam (ATIVAN) 0.5 MG tablet  Dispense: 10 tablet; Refill: 0      Lymphopenia  See above, WBC 3.4 at ED visit on 7/13. Will obtain labs at next visit or beforehand if patient can come for a lab visit.    Hyponatremia  See above, Na 131 at ED visit on 7/13. Will obtain labs at next visit or beforehand if patient can come for a lab visit.    Depression Screening Follow Up        7/15/2024    10:13 AM   PHQ   PHQ-9 Total Score 11   Q9: Thoughts of better off dead/self-harm past 2 weeks Not at all           Follow Up Actions Taken  Referred patient back to current mental health provider.  Started patient on anti-depressant.           Return in about 4 weeks (around 8/12/2024) for Follow up.    Subjective   Vikash is a 39 year old, presenting for the following health issues:  Recheck Medication (Meds for anxiety and depression, covid exhaustion - onset of covid 10 days ago)        7/15/2024    10:13 AM   Additional Questions   Roomed by Sophie   Accompanied by self         7/15/2024    Information    services provided? No        HPI     Feeling fatigued when covid started 11 days ago  Went to ED with fatigue Saturday - had a hard time walking around house, SOB, could still eat and drink  Had similar fatigue episode when first had covid a few years ago  Worked up in ED, unremarkable, see note from 7/13    Mental health  Alsways been prone to sadness, existential  Has been in \"crisis mode\" specifically around relationships ending  Has not felt suicidal but considering he may one day  Loses interest in things interesting to him  Difficulty with feelings of loneliness  These episodes have been going on his whole life  Thinks this may have had something to do with covid isolation  Stopped using marijuana two weeks ago, said it had been a crutch in the past  Feelings of regret about smoking " marijuana and missing out on social interaction and other life events  Mood swings since childhood, quick changes in same   No feelings of euphoria, irritability, high energy periods that last a week or more  Sleep problems - difficulty falling asleep, started using marijuana for this use  No feelings of guilt   No history of turbulent relationships  Anxiety - for years,  Started 2008  Tightness in chest, difficulty relaxing  Feels like heartbeat is increased in strength but not rapid during these episodes  Just started therapy last week            Objective    /68   Pulse 76   Temp 98.2  F (36.8  C) (Oral)   Resp 14   SpO2 100%   There is no height or weight on file to calculate BMI.  Physical Exam  Constitutional:       General: He is not in acute distress.     Appearance: Normal appearance.   HENT:      Head: Normocephalic and atraumatic.   Eyes:      Conjunctiva/sclera: Conjunctivae normal.   Cardiovascular:      Rate and Rhythm: Normal rate.   Pulmonary:      Effort: Pulmonary effort is normal. No respiratory distress.   Musculoskeletal:         General: No swelling, deformity or signs of injury. Normal range of motion.      Cervical back: Normal range of motion and neck supple.      Right lower leg: No edema.      Left lower leg: No edema.   Skin:     General: Skin is warm and dry.      Coloration: Skin is not jaundiced or pale.      Findings: No bruising, erythema, lesion or rash.   Neurological:      Mental Status: He is alert and oriented to person, place, and time.      Coordination: Coordination normal.      Gait: Gait normal.   Psychiatric:         Attention and Perception: Attention and perception normal.         Mood and Affect: Mood is anxious and depressed.         Speech: Speech normal.         Behavior: Behavior is slowed. Behavior is cooperative.         Thought Content: Thought content normal. Thought content does not include suicidal ideation.         Cognition and Memory: Cognition  and memory normal.         Judgment: Judgment normal.      Comments: Overall, presented with low mood, low energy, anxious and depressed affect. No concern for manic/hypomanic symptoms based on information documented in this exam and in history. No history of SI and no current SI.                    Signed Electronically by: Shahab Mcguire MD

## 2024-07-15 NOTE — PATIENT INSTRUCTIONS
Patient Education   Here is the plan from today's visit    1. Generalized anxiety disorder    - FLUoxetine (PROZAC) 10 MG capsule; Take 1 capsule (10 mg) by mouth daily for 30 days  Dispense: 30 capsule; Refill: 0  - propranolol (INDERAL) 10 MG tablet; Take 1 tablet (10 mg) by mouth 3 times daily as needed (anxiety)  Dispense: 90 tablet; Refill: 0  - LORazepam (ATIVAN) 0.5 MG tablet; Take 1 tablet (0.5 mg) by mouth daily as needed for anxiety  Dispense: 10 tablet; Refill: 0          Please call or return to clinic if your symptoms don't go away.    Follow up plan  No follow-ups on file.    Thank you for coming to [facility] today.  Lab Testing:  **If you had lab testing today and your results are reassuring or normal they will be mailed to you or sent through [employer] within 7 days.   **If the lab tests need quick action we will call you with the results.  **If you are having labs done on a different day, please call [phone] to schedule at [facility] or [phone] for other [facility] Outpatient Lab locations. Labs do not offer walk-in appointments.  The phone number we will call with results is # [phone] (home) . If this is not the best number please call our clinic and change the number.  Medication Refills:  If you need any refills please call your pharmacy and they will contact us.   If you need to  your refill at a new pharmacy, please contact the new pharmacy directly. The new pharmacy will help you get your medications transferred faster.   Scheduling:  If you have any concerns about today's visit or wish to schedule another appointment please call our office during normal business hours [phone] (8-5:00 M-F). If you can no longer make a scheduled visit, please cancel via [employer] or call us to cancel.   If a referral was made to an [facility] specialty provider and you do not get a call from central scheduling, please refer to directions on your visit summary or call  our office during normal business hours for assistance.   If a Mammogram was ordered for you at the [facility] call [phone] to schedule or change your appointment.  If you had an XRay/CT/Ultrasound/MRI ordered the number is [phone] to schedule or change your radiology appointment.   [facility] has limited ultrasound appointments available on Wednesdays, if you would like your ultrasound at [facility], please call [phone] to schedule.   Medical Concerns:  If you have urgent medical concerns please call [phone] at any time of the day.    Shahab Mcguire MD

## 2024-07-16 RX ORDER — LORAZEPAM 0.5 MG/1
0.5 TABLET ORAL DAILY PRN
Qty: 10 TABLET | Refills: 0 | Status: SHIPPED | OUTPATIENT
Start: 2024-07-16 | End: 2024-08-26

## 2024-08-01 ENCOUNTER — OFFICE VISIT (OUTPATIENT)
Dept: FAMILY MEDICINE | Facility: CLINIC | Age: 39
End: 2024-08-01
Payer: COMMERCIAL

## 2024-08-01 VITALS
BODY MASS INDEX: 18.48 KG/M2 | TEMPERATURE: 98 F | OXYGEN SATURATION: 97 % | DIASTOLIC BLOOD PRESSURE: 65 MMHG | HEART RATE: 89 BPM | HEIGHT: 67 IN | RESPIRATION RATE: 16 BRPM | SYSTOLIC BLOOD PRESSURE: 98 MMHG

## 2024-08-01 DIAGNOSIS — F41.0 PANIC DISORDER WITHOUT AGORAPHOBIA: ICD-10-CM

## 2024-08-01 DIAGNOSIS — Z11.3 ROUTINE SCREENING FOR STI (SEXUALLY TRANSMITTED INFECTION): Primary | ICD-10-CM

## 2024-08-01 DIAGNOSIS — F41.1 GENERALIZED ANXIETY DISORDER: ICD-10-CM

## 2024-08-01 DIAGNOSIS — F33.1 MODERATE EPISODE OF RECURRENT MAJOR DEPRESSIVE DISORDER (H): ICD-10-CM

## 2024-08-01 LAB
HIV 1+2 AB+HIV1 P24 AG SERPL QL IA: NONREACTIVE
T PALLIDUM AB SER QL: NONREACTIVE

## 2024-08-01 PROCEDURE — 99214 OFFICE O/P EST MOD 30 MIN: CPT | Mod: GC | Performed by: STUDENT IN AN ORGANIZED HEALTH CARE EDUCATION/TRAINING PROGRAM

## 2024-08-01 PROCEDURE — 86780 TREPONEMA PALLIDUM: CPT | Performed by: STUDENT IN AN ORGANIZED HEALTH CARE EDUCATION/TRAINING PROGRAM

## 2024-08-01 PROCEDURE — 36415 COLL VENOUS BLD VENIPUNCTURE: CPT | Performed by: STUDENT IN AN ORGANIZED HEALTH CARE EDUCATION/TRAINING PROGRAM

## 2024-08-01 PROCEDURE — 87491 CHLMYD TRACH DNA AMP PROBE: CPT | Performed by: STUDENT IN AN ORGANIZED HEALTH CARE EDUCATION/TRAINING PROGRAM

## 2024-08-01 PROCEDURE — 87389 HIV-1 AG W/HIV-1&-2 AB AG IA: CPT | Performed by: STUDENT IN AN ORGANIZED HEALTH CARE EDUCATION/TRAINING PROGRAM

## 2024-08-01 PROCEDURE — 87591 N.GONORRHOEAE DNA AMP PROB: CPT | Performed by: STUDENT IN AN ORGANIZED HEALTH CARE EDUCATION/TRAINING PROGRAM

## 2024-08-01 NOTE — PROGRESS NOTES
Assessment & Plan     Routine screening for STI (sexually transmitted infection)  Patient requested prior to starting dating after recent breakup. Not sexually active.  - Chlamydia trachomatis/Neisseria gonorrhoeae by PCR - Clinic Collect  - HIV Antigen Antibody Combo  - Treponema Abs w Reflex to RPR and Titer  - HIV Antigen Antibody Combo  - Treponema Abs w Reflex to RPR and Titer    Panic disorder without agoraphobia  Generalized anxiety disorder  Moderate episode of recurrent major depressive disorder (H)  Patient following up after visit a few weeks ago to initiate Prozac and trying short-acting anxiolytics (hydroxyzine, ativan, propranolol) all of which were ineffective and since starting Prozac has had severe anxiety upon waking as well as occasional SI without plan (feeling safe, no SI today, has moderate support system). Patient's depression still not controlled as evidenced by SI, decreased appetite, low mood. Per patient, many of his symptoms stem from recent breakup from relationship that was extremely important to him, a light work schedule, and wanting a bigger support network. Patient agreed to wait at least 6 weeks for Prozac to begin to work and for anxiety side effects to subside. Will follow up in one week--no concern for patient's safety but would like to check in frequently as patient does have SI and anxiety and is in significant distress. Will provide additional resources for finding social network. Patient will continue therapy twice per week.                  Return in about 1 week (around 8/8/2024).    Subjective   Vikash is a 39 year old, presenting for the following health issues:  Follow Up        8/1/2024     8:23 AM   Additional Questions   Roomed by Debbie   Accompanied by self         8/1/2024    Information    services provided? No        HPI     Anxiety and depression  Prozac - started taking a week ago  Increased anxiety, depression since started, some  "fatigue  Mornings are worst  Recent breakup  Prescribed propranolol, ativan, hydroxyzine a few weeks ago  Making money to survive but not a lot extra, doesn't have a lot of work  In therapy twice per week  Has some social support  Exercising, going to yoga, camping  Poor appetite  Sometimse has SI, feels safe, not today  Lots of anxiety arund relationship that recently ended    Desires STI testing - not currently sexually active; has not been for a few months and not since partner who had tested negative since they were together. Unsure what type of sex he will have or anatomy of future partners; will reassess later once patient has explored this.        Objective    BP 98/65   Pulse 89   Temp 98  F (36.7  C) (Oral)   Resp 16   Ht 1.702 m (5' 7\")   SpO2 97%   BMI 18.48 kg/m    Body mass index is 18.48 kg/m .  Physical Exam  Constitutional:       General: He is not in acute distress.     Appearance: Normal appearance. He is normal weight.   HENT:      Head: Normocephalic and atraumatic.   Eyes:      Conjunctiva/sclera: Conjunctivae normal.   Cardiovascular:      Rate and Rhythm: Normal rate.   Pulmonary:      Effort: Pulmonary effort is normal. No respiratory distress.   Musculoskeletal:         General: No swelling, deformity or signs of injury. Normal range of motion.      Cervical back: Normal range of motion and neck supple.      Right lower leg: No edema.      Left lower leg: No edema.   Skin:     General: Skin is warm and dry.      Coloration: Skin is not jaundiced or pale.      Findings: No bruising, erythema, lesion or rash.   Neurological:      Mental Status: He is alert and oriented to person, place, and time.      Coordination: Coordination normal.      Gait: Gait normal.   Psychiatric:         Attention and Perception: Attention and perception normal.         Mood and Affect: Mood is anxious and depressed. Mood is not elated. Affect is not labile, flat, tearful or inappropriate.         Speech: " Speech normal.         Behavior: Behavior is slowed. Behavior is cooperative.         Thought Content: Thought content does not include suicidal ideation. Thought content does not include suicidal plan.         Cognition and Memory: Cognition and memory normal.         Judgment: Judgment normal.      Comments: The patient endorses occasional SI without plan, but states he feels safe. He denies SI today.                    Signed Electronically by: Shahab Mcguire MD

## 2024-08-01 NOTE — PROGRESS NOTES
Preceptor Attestation:   Patient seen, evaluated and discussed with the resident. I have verified the content of the note, which accurately reflects my assessment of the patient and the plan of care.   Supervising Physician:  Jeffry Powell MD

## 2024-08-02 LAB
C TRACH DNA SPEC QL PROBE+SIG AMP: NEGATIVE
N GONORRHOEA DNA SPEC QL NAA+PROBE: NEGATIVE

## 2024-08-02 NOTE — PATIENT INSTRUCTIONS
Patient Education   Here is the plan from today's visit    1. Routine screening for STI (sexually transmitted infection)    - Chlamydia trachomatis/Neisseria gonorrhoeae by PCR - Clinic Collect  - HIV Antigen Antibody Combo; Future  - Treponema Abs w Reflex to RPR and Titer; Future  - HIV Antigen Antibody Combo  - Treponema Abs w Reflex to RPR and Titer    2. Panic disorder without agoraphobia      3. Generalized anxiety disorder      4. Moderate episode of recurrent major depressive disorder (H)            Please call or return to clinic if your symptoms don't go away.    Follow up plan  Return in about 1 week (around 8/8/2024).    Thank you for coming to [facility] today.  Lab Testing:  **If you had lab testing today and your results are reassuring or normal they will be mailed to you or sent through [employer] within 7 days.   **If the lab tests need quick action we will call you with the results.  **If you are having labs done on a different day, please call [phone] to schedule at [facility] or [phone] for other [facility] Outpatient Lab locations. Labs do not offer walk-in appointments.  The phone number we will call with results is # [phone] (home) . If this is not the best number please call our clinic and change the number.  Medication Refills:  If you need any refills please call your pharmacy and they will contact us.   If you need to  your refill at a new pharmacy, please contact the new pharmacy directly. The new pharmacy will help you get your medications transferred faster.   Scheduling:  If you have any concerns about today's visit or wish to schedule another appointment please call our office during normal business hours [phone] (8-5:00 M-F). If you can no longer make a scheduled visit, please cancel via [facility] [employer] or call us to cancel.   If a referral was made to an [facility] specialty provider and you do not get a call from central scheduling, please refer  to directions on your visit summary or call our office during normal business hours for assistance.   If a Mammogram was ordered for you at the [facility] call [phone] to schedule or change your appointment.  If you had an XRay/CT/Ultrasound/MRI ordered the number is [phone] to schedule or change your radiology appointment.   [facility] has limited ultrasound appointments available on Wednesdays, if you would like your ultrasound at [facility], please call [phone] to schedule.   Medical Concerns:  If you have urgent medical concerns please call [phone] at any time of the day.    Shahab Mcguire MD

## 2024-08-07 ENCOUNTER — OFFICE VISIT (OUTPATIENT)
Dept: FAMILY MEDICINE | Facility: CLINIC | Age: 39
End: 2024-08-07
Payer: COMMERCIAL

## 2024-08-07 VITALS
OXYGEN SATURATION: 98 % | DIASTOLIC BLOOD PRESSURE: 61 MMHG | BODY MASS INDEX: 18.48 KG/M2 | RESPIRATION RATE: 16 BRPM | HEIGHT: 67 IN | TEMPERATURE: 98 F | HEART RATE: 68 BPM | SYSTOLIC BLOOD PRESSURE: 95 MMHG

## 2024-08-07 DIAGNOSIS — R53.83 FATIGUE, UNSPECIFIED TYPE: ICD-10-CM

## 2024-08-07 DIAGNOSIS — F33.1 MODERATE EPISODE OF RECURRENT MAJOR DEPRESSIVE DISORDER (H): Primary | ICD-10-CM

## 2024-08-07 DIAGNOSIS — F41.1 GENERALIZED ANXIETY DISORDER: ICD-10-CM

## 2024-08-07 DIAGNOSIS — G56.21 ULNAR NERVE ENTRAPMENT AT ELBOW, RIGHT: ICD-10-CM

## 2024-08-07 LAB
ALBUMIN SERPL BCG-MCNC: 4.7 G/DL (ref 3.5–5.2)
ALP SERPL-CCNC: 49 U/L (ref 40–150)
ALT SERPL W P-5'-P-CCNC: 24 U/L (ref 0–70)
ANION GAP SERPL CALCULATED.3IONS-SCNC: 10 MMOL/L (ref 7–15)
AST SERPL W P-5'-P-CCNC: 22 U/L (ref 0–45)
BASOPHILS # BLD AUTO: 0 10E3/UL (ref 0–0.2)
BASOPHILS NFR BLD AUTO: 1 %
BILIRUB SERPL-MCNC: 0.6 MG/DL
BUN SERPL-MCNC: 8.9 MG/DL (ref 6–20)
CALCIUM SERPL-MCNC: 9.2 MG/DL (ref 8.8–10.4)
CHLORIDE SERPL-SCNC: 95 MMOL/L (ref 98–107)
CREAT SERPL-MCNC: 0.78 MG/DL (ref 0.67–1.17)
EGFRCR SERPLBLD CKD-EPI 2021: >90 ML/MIN/1.73M2
EOSINOPHIL # BLD AUTO: 0 10E3/UL (ref 0–0.7)
EOSINOPHIL NFR BLD AUTO: 1 %
ERYTHROCYTE [DISTWIDTH] IN BLOOD BY AUTOMATED COUNT: 12.6 % (ref 10–15)
GLUCOSE SERPL-MCNC: 98 MG/DL (ref 70–99)
HCO3 SERPL-SCNC: 27 MMOL/L (ref 22–29)
HCT VFR BLD AUTO: 40.6 % (ref 40–53)
HGB BLD-MCNC: 13.5 G/DL (ref 13.3–17.7)
IMM GRANULOCYTES # BLD: 0 10E3/UL
IMM GRANULOCYTES NFR BLD: 0 %
LYMPHOCYTES # BLD AUTO: 1 10E3/UL (ref 0.8–5.3)
LYMPHOCYTES NFR BLD AUTO: 23 %
MCH RBC QN AUTO: 30.9 PG (ref 26.5–33)
MCHC RBC AUTO-ENTMCNC: 33.3 G/DL (ref 31.5–36.5)
MCV RBC AUTO: 93 FL (ref 78–100)
MONOCYTES # BLD AUTO: 0.5 10E3/UL (ref 0–1.3)
MONOCYTES NFR BLD AUTO: 12 %
NEUTROPHILS # BLD AUTO: 2.6 10E3/UL (ref 1.6–8.3)
NEUTROPHILS NFR BLD AUTO: 63 %
PLATELET # BLD AUTO: 261 10E3/UL (ref 150–450)
POTASSIUM SERPL-SCNC: 5 MMOL/L (ref 3.4–5.3)
PROT SERPL-MCNC: 6.9 G/DL (ref 6.4–8.3)
RBC # BLD AUTO: 4.37 10E6/UL (ref 4.4–5.9)
SODIUM SERPL-SCNC: 132 MMOL/L (ref 135–145)
TSH SERPL DL<=0.005 MIU/L-ACNC: 0.89 UIU/ML (ref 0.3–4.2)
WBC # BLD AUTO: 4.1 10E3/UL (ref 4–11)

## 2024-08-07 PROCEDURE — 99214 OFFICE O/P EST MOD 30 MIN: CPT | Mod: 25

## 2024-08-07 PROCEDURE — 80053 COMPREHEN METABOLIC PANEL: CPT

## 2024-08-07 PROCEDURE — 85025 COMPLETE CBC W/AUTO DIFF WBC: CPT

## 2024-08-07 PROCEDURE — 90746 HEPB VACCINE 3 DOSE ADULT IM: CPT

## 2024-08-07 PROCEDURE — 84443 ASSAY THYROID STIM HORMONE: CPT

## 2024-08-07 PROCEDURE — 36415 COLL VENOUS BLD VENIPUNCTURE: CPT

## 2024-08-07 PROCEDURE — 90471 IMMUNIZATION ADMIN: CPT

## 2024-08-07 NOTE — PROGRESS NOTES
Assessment & Plan     Moderate episode of recurrent major depressive disorder (H)  Generalized anxiety disorder  On second week of Prozac, going to therapy twice per week which is going very well.  Has no side effects to Prozac that he has noticed. Has pretty dysregulated sleep, so discussed sleep hygiene and trying CBTi if this is something he is interested in. Intermittently has passive SI but no plan, feels safe, and currently does not have SI. Follow up in 4 weeks - can discuss increasing Prozac at that time.     Fatigue, unspecified type  Patient to complete labs that were ordered at previous visit.   - CBC with platelets differential  - Comprehensive metabolic panel  - TSH with free T4 reflex    Ulnar nerve entrapment at elbow, right  Has had this before. Worsening again in the past 1-2 weeks. Is trying conservative measures now with some home exercises, cushioning on arm of chairs, and avoiding compression of nerve while sitting or driving. May be interested in PT - asked him to send me a message if he ends up wanting a referral.    Return in about 4 weeks (around 9/4/2024) for Follow up.    Rhonda Suh is a 39 year old, presenting for the following health issues:  Follow Up (Medication follow up + pt referral )        8/7/2024    11:26 AM   Additional Questions   Roomed by Halie   Accompanied by self         8/7/2024   Declines Weight   Did patient decline having their weight taken? Yes          8/7/2024    Information    services provided? No        HPI   Week 2 on prozac  Started therapy twice per week  May trial hypotherapy  Prozac is the first med he has been on  Grieving a break up  Therapy in person, next appt is tomorrow    First week was tough, going through a lot of personal stuff  Mornings have been tough  Takes prozac in the morning  No side effects - no nausea  Very fatigued, but unable to fall asleep some nights until 1 am  Has tried going to bed earlier  No screens  "in bedroom  Racing thoughts  Breathing practices, meditation    Doing a lot of transition  Working on looking for new career and new living situation  Goal to feeling more regulated and less overwhelmed  Wondering about ADHD          Objective    BP 95/61   Pulse 68   Temp 98  F (36.7  C) (Oral)   Resp 16   Ht 1.702 m (5' 7\")   SpO2 98%   BMI 18.48 kg/m    Body mass index is 18.48 kg/m .  Physical Exam  Vitals reviewed.   Constitutional:       General: He is not in acute distress.     Appearance: Normal appearance. He is not ill-appearing or diaphoretic.   HENT:      Head: Normocephalic and atraumatic.   Eyes:      General: No scleral icterus.     Conjunctiva/sclera: Conjunctivae normal.   Cardiovascular:      Rate and Rhythm: Normal rate.   Pulmonary:      Effort: Pulmonary effort is normal.   Skin:     General: Skin is warm and dry.   Neurological:      General: No focal deficit present.      Mental Status: He is alert.   Psychiatric:         Attention and Perception: Attention normal.         Mood and Affect: Affect normal. Mood is anxious.         Speech: Speech normal.         Behavior: Behavior normal.         Thought Content: Thought content does not include suicidal ideation. Thought content does not include suicidal plan.         Judgment: Judgment is not impulsive or inappropriate.        Results for orders placed or performed in visit on 08/07/24   Comprehensive metabolic panel     Status: Abnormal   Result Value Ref Range    Sodium 132 (L) 135 - 145 mmol/L    Potassium 5.0 3.4 - 5.3 mmol/L    Carbon Dioxide (CO2) 27 22 - 29 mmol/L    Anion Gap 10 7 - 15 mmol/L    Urea Nitrogen 8.9 6.0 - 20.0 mg/dL    Creatinine 0.78 0.67 - 1.17 mg/dL    GFR Estimate >90 >60 mL/min/1.73m2    Calcium 9.2 8.8 - 10.4 mg/dL    Chloride 95 (L) 98 - 107 mmol/L    Glucose 98 70 - 99 mg/dL    Alkaline Phosphatase 49 40 - 150 U/L    AST 22 0 - 45 U/L    ALT 24 0 - 70 U/L    Protein Total 6.9 6.4 - 8.3 g/dL    Albumin 4.7 3.5 " - 5.2 g/dL    Bilirubin Total 0.6 <=1.2 mg/dL   TSH with free T4 reflex     Status: Normal   Result Value Ref Range    TSH 0.89 0.30 - 4.20 uIU/mL   CBC with platelets and differential     Status: Abnormal   Result Value Ref Range    WBC Count 4.1 4.0 - 11.0 10e3/uL    RBC Count 4.37 (L) 4.40 - 5.90 10e6/uL    Hemoglobin 13.5 13.3 - 17.7 g/dL    Hematocrit 40.6 40.0 - 53.0 %    MCV 93 78 - 100 fL    MCH 30.9 26.5 - 33.0 pg    MCHC 33.3 31.5 - 36.5 g/dL    RDW 12.6 10.0 - 15.0 %    Platelet Count 261 150 - 450 10e3/uL    % Neutrophils 63 %    % Lymphocytes 23 %    % Monocytes 12 %    % Eosinophils 1 %    % Basophils 1 %    % Immature Granulocytes 0 %    Absolute Neutrophils 2.6 1.6 - 8.3 10e3/uL    Absolute Lymphocytes 1.0 0.8 - 5.3 10e3/uL    Absolute Monocytes 0.5 0.0 - 1.3 10e3/uL    Absolute Eosinophils 0.0 0.0 - 0.7 10e3/uL    Absolute Basophils 0.0 0.0 - 0.2 10e3/uL    Absolute Immature Granulocytes 0.0 <=0.4 10e3/uL   CBC with platelets differential     Status: Abnormal    Narrative    The following orders were created for panel order CBC with platelets differential.  Procedure                               Abnormality         Status                     ---------                               -----------         ------                     CBC with platelets and d...[[id number]]  Abnormal            Final result                 Please view results for these tests on the individual orders.     Signed Electronically by: Kurt Antonio MD

## 2024-08-18 ASSESSMENT — ACTIVITIES OF DAILY LIVING (ADL)
LAUNDERING_CLOTHES: A LITTLE BIT OF DIFFICULTY
OPENING_DOORS: A LITTLE BIT OF DIFFICULTY
CARRYING_A_SMALL_SUITCASE_WITH_YOUR_AFFECTED_LIMB: A LITTLE BIT OF DIFFICULTY
ANY_OF_YOUR_USUAL_WORK,_HOUSEWORK_OR_SCHOOL_ACTIVITIES: MODERATE DIFFICULTY
WASHING_YOUR_HAIR_OR_SCALP: MODERATE DIFFICULTY
VACUUMING,_SWEEPING,_OR_RAKING: MODERATE DIFFICULTY
TYING_OR_LACING_SHOES: A LITTLE BIT OF DIFFICULTY
PREPARING_FOOD: MODERATE DIFFICULTY
THROWING_A_BALL: MODERATE DIFFICULTY
PLACING_AN_OBJECT_ONTO,_OR_REMOVING_IT_FROM_AN_OVERHEAD_SHELF: A LITTLE BIT OF DIFFICULTY
WALKING_A_MILE: QUITE A BIT OF DIFFICULTY
PLEASE_INDICATE_YOR_PRIMARY_REASON_FOR_REFERRAL_TO_THERAPY:: ELBOW
PLEASE_INDICATE_YOR_PRIMARY_REASON_FOR_REFERRAL_TO_THERAPY:: FOOT AND/OR ANKLE
STANDING_FOR_1_HOUR: QUITE A BIT OF DIFFICULTY
SQUATTING: A LITTLE BIT OF DIFFICULTY
LIFTING_A_BAG_OF_GROCERIES_TO_WAIST_LEVEL: MODERATE DIFFICULTY
ROLLING_OVER_IN_BED: NO DIFFICULTY
DRIVING: A LITTLE BIT OF DIFFICULTY
UEFI_TOTAL_SCORE/80: .59
LEFS_RAW_SCORE: 0
CLEANING: MODERATE DIFFICULTY
DOING_UP_BUTTONS: A LITTLE BIT OF DIFFICULTY
SHOPPING: A LITTLE BIT OF DIFFICULTY
ANY_OF_YOUR_USUAL_WORK,_HOUSEWORK_OR_SCHOOL_ACTIVITIES: A LITTLE BIT OF DIFFICULTY
GETTING_INTO_AND_OUT_OF_A_BATH: A LITTLE BIT OF DIFFICULTY
PERFORMING_HEAVY_ACTIVITIES_AROUND_YOUR_HOME: A LITTLE BIT OF DIFFICULTY
WALKING_2_BLOCKS: MODERATE DIFFICULTY
LIFTING_AN_OBJECT,_LIKE_A_BAG_OF_GROCERIES_FROM_THE_FLOOR: NO DIFFICULTY
DRESS: A LITTLE BIT OF DIFFICULTY
YOUR_USUAL_HOBBIES,_RECREATIONAL_OR_SPORTING_ACTIVITIES: MODERATE DIFFICULTY
PUTTING_ON_YOUR_SHOES_OR_SOCKS: NO DIFFICULTY
GETTING_INTO_OR_OUT_OF_A_CAR: A LITTLE BIT OF DIFFICULTY
RUNNING_ON_EVEN_GROUND: MODERATE DIFFICULTY
GOING_UP_OR_DOWN_10_STAIRS: A LITTLE BIT OF DIFFICULTY
YOUR_USUAL_HOBBIES,_RECREATIONAL_OR_SPORTING_ACTIVITIES: QUITE A BIT OF DIFFICULTY
PERFORMING_LIGHT_ACTIVITIES_AROUND_YOUR_HOME: A LITTLE BIT OF DIFFICULTY
SITTING_FOR_1_HOUR: A LITTLE BIT OF DIFFICULTY
PUSHING_UP_ON_YOUR_HANDS: MODERATE DIFFICULTY
UEFI_TOTAL_SCORE: 47
LEFS_SCORE(%): 0
MAKING_SHARP_TURNS_WHILE_RUNNING_FAST: A LITTLE BIT OF DIFFICULTY
OPENING_A_JAR: MODERATE DIFFICULTY
SLEEPING: A LITTLE BIT OF DIFFICULTY
WALKING_BETWEEN_ROOMS: A LITTLE BIT OF DIFFICULTY
USING_TOOLS_OR_APPLIANCES: A LITTLE BIT OF DIFFICULTY
RUNNING_ON_UNEVEN_GROUND: QUITE A BIT OF DIFFICULTY

## 2024-08-19 ENCOUNTER — THERAPY VISIT (OUTPATIENT)
Dept: PHYSICAL THERAPY | Facility: CLINIC | Age: 39
End: 2024-08-19
Payer: COMMERCIAL

## 2024-08-19 DIAGNOSIS — G56.21 ULNAR NERVE ENTRAPMENT AT ELBOW, RIGHT: ICD-10-CM

## 2024-08-19 PROCEDURE — 97161 PT EVAL LOW COMPLEX 20 MIN: CPT | Mod: GP

## 2024-08-19 PROCEDURE — 97110 THERAPEUTIC EXERCISES: CPT | Mod: GP
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
## 2024-08-21 ENCOUNTER — MYC REFILL (OUTPATIENT)
Dept: FAMILY MEDICINE | Facility: CLINIC | Age: 39
End: 2024-08-21
Payer: COMMERCIAL

## 2024-08-21 DIAGNOSIS — F41.1 GENERALIZED ANXIETY DISORDER: ICD-10-CM

## 2024-08-21 RX ORDER — FLUOXETINE 10 MG/1
10 CAPSULE ORAL DAILY
Qty: 30 CAPSULE | Refills: 0 | Status: SHIPPED | OUTPATIENT
Start: 2024-08-21 | End: 2024-08-26

## 2024-08-21 NOTE — TELEPHONE ENCOUNTER
"Request for medication refill:  FLUoxetine (PROZAC) 10 MG capsule ()   Providers if patient needs an appointment and you are willing to give a one month supply please refill for one month and  send a letter/MyChart using \".SMILLIMITEDREFILL\" .smillimited and route chart to \"P [facility] \" (Giving one month refill in non controlled medications is strongly recommended before denial)    If refill has been denied, meaning absolutely no refills without visit, please complete the smart phrase \".smirxrefuse\" and route it to the \"P SMI MED REFILLS\"  pool to inform the patient and the pharmacy.    Areli Dawson MA     "

## 2024-08-26 ENCOUNTER — OFFICE VISIT (OUTPATIENT)
Dept: FAMILY MEDICINE | Facility: CLINIC | Age: 39
End: 2024-08-26
Payer: COMMERCIAL

## 2024-08-26 VITALS
SYSTOLIC BLOOD PRESSURE: 94 MMHG | WEIGHT: 117.8 LBS | RESPIRATION RATE: 16 BRPM | BODY MASS INDEX: 18.49 KG/M2 | TEMPERATURE: 97.8 F | OXYGEN SATURATION: 98 % | HEIGHT: 67 IN | HEART RATE: 75 BPM | DIASTOLIC BLOOD PRESSURE: 63 MMHG

## 2024-08-26 DIAGNOSIS — F41.1 GENERALIZED ANXIETY DISORDER: ICD-10-CM

## 2024-08-26 DIAGNOSIS — S56.419A STRAIN OF EXTENSOR DIGITORUM TENDON: ICD-10-CM

## 2024-08-26 DIAGNOSIS — M67.88 PERONEAL TENDINOSIS: ICD-10-CM

## 2024-08-26 DIAGNOSIS — M25.579 PAIN IN JOINT, ANKLE AND FOOT, UNSPECIFIED LATERALITY: Primary | ICD-10-CM

## 2024-08-26 PROCEDURE — 99214 OFFICE O/P EST MOD 30 MIN: CPT | Mod: GC

## 2024-08-26 RX ORDER — FLUOXETINE 10 MG/1
20 CAPSULE ORAL DAILY
Qty: 30 CAPSULE | Refills: 0 | Status: SHIPPED | OUTPATIENT
Start: 2024-08-26 | End: 2024-09-06

## 2024-08-26 ASSESSMENT — PATIENT HEALTH QUESTIONNAIRE - PHQ9
SUM OF ALL RESPONSES TO PHQ QUESTIONS 1-9: 4
SUM OF ALL RESPONSES TO PHQ QUESTIONS 1-9: 4
10. IF YOU CHECKED OFF ANY PROBLEMS, HOW DIFFICULT HAVE THESE PROBLEMS MADE IT FOR YOU TO DO YOUR WORK, TAKE CARE OF THINGS AT HOME, OR GET ALONG WITH OTHER PEOPLE: SOMEWHAT DIFFICULT

## 2024-08-26 NOTE — PROGRESS NOTES
Assessment & Plan     Generalized anxiety disorder  Has been on prozac 10mg for 1 month (since 7/24/24) and not noticing much improvement.  Some increased psychomotor agitation and flattening of mood.  Offered options of switching to antianxiety medicine with lower risk of psychomotor agitation such as escitalopram versus increasing fluoxetine to therapeutic dose and monitoring for worsening of side effects.  Would like to try increasing to 20 mg.  If side effects worsen on 20 mg, will go back down to 10 mg and notify me via MyChart.  Continue with therapy, follow-up in 4 weeks or sooner if symptoms worsen  - FLUoxetine (PROZAC) 10 MG capsule; Take 2 capsules (20 mg) by mouth daily.    Pain in joint, ankle and foot, unspecified laterality  Peroneal tendinosis  Strain of extensor digitorum tendon  Mild to moderate midfoot, medial ankle, and lateral ankle muscle soreness after suddenly increasing physical activity level.  Appears to be simple tendinosis.  No laxity, bony tenderness, restriction of range of motion or strength.  Somewhat prominent base of third metatarsal on the right, however unknown clinical significance of this.  Very mild flatfoot deformity but this does not correlate with location of pain and so do not suspect this to be a contributor.  If trial of physical therapy does not improve symptoms, would recommend further imaging and referral to orthotics.  - Physical Therapy  Referral; Future          Return in about 4 weeks (around 9/23/2024).    Prominence of base of 2nd metatarsal. No ligamental laxity, flat foot deformity,     Subjective   Vikash is a 39 year old, presenting for the following health issues:  Follow Up and Referral (PT for ankles )        8/26/2024     8:23 AM   Additional Questions   Roomed by Indra         8/26/2024    Information    services provided? No      HPI   Last seen for depression and anxiety at [facility] on August 7.  At that time, was on second  "week of Prozac (10mg) and going to therapy twice per week.  Intermittent passive SI but no plan or intent.  Recommended to follow-up to discuss increasing Prozac.     Since then, hasn't noticed much of a difference.   Doesn't feel like they have as much access to their emotions  Recently has increased restlessness and anxiety    Ankles  Noted ankles getting sore and didn't rest them  Last month, had covid and was in bed for 2 weeks. Went from that to walking a lot in not great shoes  Pain in the medial ankle, dorsal foot, achilles  No pain in bottom of foot  Both feet, but R > L        3/31/2022    10:28 AM 7/15/2024    10:13 AM 8/26/2024     8:04 AM   PHQ   PHQ-9 Total Score 6 11 4   Q9: Thoughts of better off dead/self-harm past 2 weeks Not at all Not at all Not at all             3/31/2022    10:27 AM 8/18/2022     2:45 PM   NICA-7 SCORE   Total Score 6 (mild anxiety) 9 (mild anxiety)   Total Score 6 9                       Objective    BP 94/63   Pulse 75   Temp 97.8  F (36.6  C) (Temporal)   Resp 16   Ht 1.702 m (5' 7\")   Wt 53.4 kg (117 lb 12.8 oz)   SpO2 98%   BMI 18.45 kg/m    Body mass index is 18.45 kg/m .  Physical Exam  Constitutional:       Appearance: Normal appearance.   Musculoskeletal:      Right ankle: Normal. No swelling, deformity or ecchymosis. No tenderness. Normal range of motion. Normal pulse.      Right Achilles Tendon: Normal. No tenderness or defects. Churchill's test negative.      Left ankle: Normal. No swelling, deformity or ecchymosis. No tenderness. Normal range of motion. Normal pulse.      Left Achilles Tendon: Normal. No tenderness or defects. Churchill's test negative.      Right foot: Normal range of motion and normal capillary refill. Deformity present. No swelling, bunion, Charcot foot, foot drop, prominent metatarsal heads, tenderness, bony tenderness or crepitus. Normal pulse.      Left foot: Normal range of motion and normal capillary refill. No swelling, deformity, " bunion, Charcot foot, foot drop, prominent metatarsal heads, tenderness, bony tenderness or crepitus. Normal pulse.   Neurological:      Mental Status: He is alert.                    Signed Electronically by: Uma Walton MD    Answers submitted by the patient for this visit:  Patient Health Questionnaire (Submitted on 8/26/2024)  If you checked off any problems, how difficult have these problems made it for you to do your work, take care of things at home, or get along with other people?: Somewhat difficult  PHQ9 TOTAL SCORE: 4     Alar Island Pedicle Flap Text: The defect edges were debeveled with a #15 scalpel blade.  Given the location of the defect, shape of the defect and the proximity to the alar rim an island pedicle advancement flap was deemed most appropriate.  Using a sterile surgical marker, an appropriate advancement flap was drawn incorporating the defect, outlining the appropriate donor tissue and placing the expected incisions within the nasal ala running parallel to the alar rim. The area thus outlined was incised with a #15 scalpel blade.  The skin margins were undermined minimally to an appropriate distance in all directions around the primary defect and laterally outward around the island pedicle utilizing iris scissors.  There was minimal undermining beneath the pedicle flap.

## 2024-08-26 NOTE — PROGRESS NOTES
Preceptor Attestation:   Patient seen, evaluated and discussed with the resident. I have verified the content of the note, which accurately reflects my assessment of the patient and the plan of care.   Supervising Physician:  Stone Duvall MD

## 2024-08-29 ENCOUNTER — THERAPY VISIT (OUTPATIENT)
Dept: PHYSICAL THERAPY | Facility: CLINIC | Age: 39
End: 2024-08-29
Attending: FAMILY MEDICINE
Payer: COMMERCIAL

## 2024-08-29 DIAGNOSIS — M25.579 PAIN IN JOINT, ANKLE AND FOOT, UNSPECIFIED LATERALITY: Primary | ICD-10-CM

## 2024-08-29 PROBLEM — M25.572 BILATERAL ANKLE PAIN: Status: ACTIVE | Noted: 2024-08-29

## 2024-08-29 PROBLEM — M25.571 BILATERAL ANKLE PAIN: Status: ACTIVE | Noted: 2024-08-29

## 2024-08-29 PROCEDURE — 97110 THERAPEUTIC EXERCISES: CPT | Mod: GP

## 2024-08-29 PROCEDURE — 97161 PT EVAL LOW COMPLEX 20 MIN: CPT | Mod: GP

## 2024-08-29 ASSESSMENT — ACTIVITIES OF DAILY LIVING (ADL)
RUNNING_ON_EVEN_GROUND: QUITE A BIT OF DIFFICULTY
SITTING_FOR_1_HOUR: A LITTLE BIT OF DIFFICULTY
GOING_UP_OR_DOWN_10_STAIRS: A LITTLE BIT OF DIFFICULTY
MAKING_SHARP_TURNS_WHILE_RUNNING_FAST: MODERATE DIFFICULTY
LEFS_SCORE(%): 0
WALKING_2_BLOCKS: MODERATE DIFFICULTY
RUNNING_ON_UNEVEN_GROUND: EXTREME DIFFICULTY OR UNABLE TO PERFORM ACTIVITY
PUTTING_ON_YOUR_SHOES_OR_SOCKS: A LITTLE BIT OF DIFFICULTY
PLEASE_INDICATE_YOR_PRIMARY_REASON_FOR_REFERRAL_TO_THERAPY:: FOOT AND/OR ANKLE
YOUR_USUAL_HOBBIES,_RECREATIONAL_OR_SPORTING_ACTIVITIES: QUITE A BIT OF DIFFICULTY
ROLLING_OVER_IN_BED: NO DIFFICULTY
PERFORMING_HEAVY_ACTIVITIES_AROUND_YOUR_HOME: MODERATE DIFFICULTY
WALKING_A_MILE: QUITE A BIT OF DIFFICULTY
LIFTING_AN_OBJECT,_LIKE_A_BAG_OF_GROCERIES_FROM_THE_FLOOR: A LITTLE BIT OF DIFFICULTY
LEFS_RAW_SCORE: 0
STANDING_FOR_1_HOUR: QUITE A BIT OF DIFFICULTY
WALKING_BETWEEN_ROOMS: A LITTLE BIT OF DIFFICULTY
SHOPPING: MODERATE DIFFICULTY
GETTING_INTO_OR_OUT_OF_A_CAR: NO DIFFICULTY
GETTING_INTO_AND_OUT_OF_A_BATH: NO DIFFICULTY
SQUATTING: A LITTLE BIT OF DIFFICULTY
ANY_OF_YOUR_USUAL_WORK,_HOUSEWORK_OR_SCHOOL_ACTIVITIES: MODERATE DIFFICULTY
PERFORMING_LIGHT_ACTIVITIES_AROUND_YOUR_HOME: A LITTLE BIT OF DIFFICULTY

## 2024-08-29 NOTE — PROGRESS NOTES
PHYSICAL THERAPY EVALUATION  Type of Visit: Evaluation        Fall Risk Screen:  Have you fallen 2 or more times in the past year?: No  Have you fallen and had an injury in the past year?: Yes  Is patient a fall risk?: No    Subjective       Presenting condition or subjective complaint: Patient reports an increase in activity during a conference about 6 weeks ago with an onset of ankle pain that has not gone away since  Date of onset: 07/17/24    Relevant medical history: Depression   Dates & types of surgery:      Prior diagnostic imaging/testing results:       Prior therapy history for the same diagnosis, illness or injury: Yes about 20 years ago for similar ankle pain    Prior Level of Function  Transfers: Independent  Ambulation: Independent  ADL: Independent    Living Environment  Social support: Alone   Type of home: House   Stairs to enter the home: Yes 15 Is there a railing: Yes     Ramp: No   Stairs inside the home: No       Help at home: None  Equipment owned:       Employment: Yes  and Music Therapist  Hobbies/Interests: Music performance, hiking, yoga    Patient goals for therapy: walking, hiking, yoga, generally being active in the community    Pain assessment: Pain present     Objective   FOOT/ANKLE EVALUATION  PAIN: Pain Level at Rest: 1/10  Pain Level with Use: 5/10  Pain Location: ankle  Pain Quality: achy, stinging, swollen  Pain is Exacerbated By: walking, hiking, yoga, static positioning  Pain is Relieved By: cold, rest, and elevation, PF stretch  INTEGUMENTARY (edema, incisions): WNL  POSTURE: WNL  GAIT:   Gait Deviations: WNL  BALANCE/PROPRIOCEPTION: WNL  ROM:   (Degrees) Left AROM Left PROM  Right AROM Right PROM   Ankle Dorsiflexion WNL WNL     Ankle Plantarflexion WNL WNL     Ankle Inversion WNL WNL     Ankle Eversion WNL WNL     Great Toe Flexion       Great Toe Extension       No pain with overpressure    STRENGTH:  WNL Ankle DF, EVR, INV  SL Calf Raises: R 12, L  14    FLEXIBILITY: WNL  SPECIAL TESTS:    Left Right   Tinel Sign Negative  Negative    Churchill Sign     Windlass Test     Ligamentous Stability     Anterior Drawer Negative,   Negative,     Kleiger ER Test     Longitudinal Arch Angle Test     Talar Tilt Negative,   Negative,       FUNCTIONAL TESTS: Single Leg Squat: Good technique/no significant findings  PALPATION: WNL  JOINT MOBILITY: WNL    Assessment & Plan   CLINICAL IMPRESSIONS  Medical Diagnosis: Unspecified Ankle pain    Treatment Diagnosis: bilateral ankle pain   Impression/Assessment: Patient is a 39 year old male with bilateral ankle pain complaints.  The following significant findings have been identified: Pain, Decreased strength, Impaired muscle performance, and Decreased activity tolerance. These impairments interfere with their ability to perform recreational activities as compared to previous level of function.     Clinical Decision Making (Complexity):  Clinical Presentation: Stable/Uncomplicated  Clinical Presentation Rationale: based on medical and personal factors listed in PT evaluation  Clinical Decision Making (Complexity): Low complexity    PLAN OF CARE  Treatment Interventions:  Interventions: Gait Training, Manual Therapy, Neuromuscular Re-education, Therapeutic Activity, Therapeutic Exercise, Self-Care/Home Management    Long Term Goals     PT Goal 1  Goal Identifier: walking  Goal Description: Patient will be able to walk >30 minutes without an increase in symptoms  Rationale: to maximize safety and independence with performance of ADLs and functional tasks  Target Date: 11/21/24  PT Goal 2  Goal Identifier: Yoga  Goal Description: Patient will resume regular participation in yoga without an increase in symptoms  Rationale: to maximize safety and independence with performance of ADLs and functional tasks  Target Date: 11/21/24      Frequency of Treatment: 1x/week  Duration of Treatment: 12 weeks    Recommended Referrals to Other  Professionals: None  Education Assessment:   Learner/Method: Patient;Demonstration;Pictures/Video  Education Comments: Discussion on impairments, plan of care, use of targeted exercise to improve ankle function/pain    Risks and benefits of evaluation/treatment have been explained.   Patient/Family/caregiver agrees with Plan of Care.     Evaluation Time:     PT Eval, Low Complexity Minutes (17046): 15     Signing Clinician: SHERRI Weinberg [facility]                                                                                   OUTPATIENT PHYSICAL THERAPY      PLAN OF TREATMENT FOR OUTPATIENT REHABILITATION   Patient's Last Name, First Name, ANNALISASuryaCECYVikash Talamantes    YOB: 1985   Provider's Name   [facility]   Medical Record No.  [id number]     Onset Date: 07/17/24  Start of Care Date: 08/29/24     Medical Diagnosis:  Unspecified Ankle pain      PT Treatment Diagnosis:  bilateral ankle pain Plan of Treatment  Frequency/Duration: 1x/week/ 12 weeks    Certification date from 08/29/24 to 11/21/24         See note for plan of treatment details and functional goals     Elpidio Preciado PT                         I CERTIFY THE NEED FOR THESE SERVICES FURNISHED UNDER        THIS PLAN OF TREATMENT AND WHILE UNDER MY CARE     (Physician attestation of this document indicates review and certification of the therapy plan).              Referring Provider:  Stone Duvall    Initial Assessment  See Epic Evaluation- Start of Care Date: 08/29/24

## 2024-09-05 ENCOUNTER — THERAPY VISIT (OUTPATIENT)
Dept: PHYSICAL THERAPY | Facility: CLINIC | Age: 39
End: 2024-09-05
Payer: COMMERCIAL

## 2024-09-05 DIAGNOSIS — M25.571 BILATERAL ANKLE PAIN, UNSPECIFIED CHRONICITY: Primary | ICD-10-CM

## 2024-09-05 DIAGNOSIS — M25.572 BILATERAL ANKLE PAIN, UNSPECIFIED CHRONICITY: Primary | ICD-10-CM

## 2024-09-05 PROCEDURE — 97110 THERAPEUTIC EXERCISES: CPT | Mod: GP

## 2024-09-05 PROCEDURE — 97112 NEUROMUSCULAR REEDUCATION: CPT | Mod: GP

## 2024-09-06 ENCOUNTER — MYC REFILL (OUTPATIENT)
Dept: FAMILY MEDICINE | Facility: CLINIC | Age: 39
End: 2024-09-06
Payer: COMMERCIAL

## 2024-09-06 DIAGNOSIS — F41.1 GENERALIZED ANXIETY DISORDER: ICD-10-CM

## 2024-09-06 NOTE — TELEPHONE ENCOUNTER
" Patient Comment: Upped to 20mg a day so they will run out next Tuesday.     Request for medication refill:    Providers if patient needs an appointment and you are willing to give a one month supply please refill for one month and  send a letter/MyChart using \".SMILLIMITEDREFILL\" .smillimited and route chart to \"P SMI \" (Giving one month refill in non controlled medications is strongly recommended before denial)    If refill has been denied, meaning absolutely no refills without visit, please complete the smart phrase \".smirxrefuse\" and route it to the \"P SMI MED REFILLS\"  pool to inform the patient and the pharmacy.    Azucena Shukla RN      "

## 2024-09-09 RX ORDER — FLUOXETINE 10 MG/1
20 CAPSULE ORAL DAILY
Qty: 30 CAPSULE | Refills: 0 | Status: SHIPPED | OUTPATIENT
Start: 2024-09-09 | End: 2024-09-16

## 2024-09-16 ENCOUNTER — OFFICE VISIT (OUTPATIENT)
Dept: FAMILY MEDICINE | Facility: CLINIC | Age: 39
End: 2024-09-16
Payer: COMMERCIAL

## 2024-09-16 VITALS
SYSTOLIC BLOOD PRESSURE: 106 MMHG | BODY MASS INDEX: 18.45 KG/M2 | OXYGEN SATURATION: 99 % | RESPIRATION RATE: 16 BRPM | TEMPERATURE: 98.1 F | HEIGHT: 67 IN | DIASTOLIC BLOOD PRESSURE: 64 MMHG | HEART RATE: 69 BPM

## 2024-09-16 DIAGNOSIS — F41.1 GENERALIZED ANXIETY DISORDER: ICD-10-CM

## 2024-09-16 PROCEDURE — 90746 HEPB VACCINE 3 DOSE ADULT IM: CPT

## 2024-09-16 PROCEDURE — 90472 IMMUNIZATION ADMIN EACH ADD: CPT

## 2024-09-16 PROCEDURE — 90656 IIV3 VACC NO PRSV 0.5 ML IM: CPT

## 2024-09-16 PROCEDURE — 99214 OFFICE O/P EST MOD 30 MIN: CPT | Mod: 25

## 2024-09-16 PROCEDURE — 90471 IMMUNIZATION ADMIN: CPT

## 2024-09-16 RX ORDER — FLUOXETINE 10 MG/1
20 CAPSULE ORAL DAILY
Qty: 30 CAPSULE | Refills: 0 | Status: SHIPPED | OUTPATIENT
Start: 2024-09-16

## 2024-09-16 RX ORDER — ESCITALOPRAM OXALATE 10 MG/1
TABLET ORAL
Qty: 40 TABLET | Refills: 0 | Status: SHIPPED | OUTPATIENT
Start: 2024-09-16

## 2024-09-16 ASSESSMENT — ANXIETY QUESTIONNAIRES
GAD7 TOTAL SCORE: 4
IF YOU CHECKED OFF ANY PROBLEMS ON THIS QUESTIONNAIRE, HOW DIFFICULT HAVE THESE PROBLEMS MADE IT FOR YOU TO DO YOUR WORK, TAKE CARE OF THINGS AT HOME, OR GET ALONG WITH OTHER PEOPLE: SOMEWHAT DIFFICULT
1. FEELING NERVOUS, ANXIOUS, OR ON EDGE: SEVERAL DAYS
5. BEING SO RESTLESS THAT IT IS HARD TO SIT STILL: NOT AT ALL
7. FEELING AFRAID AS IF SOMETHING AWFUL MIGHT HAPPEN: NOT AT ALL
GAD7 TOTAL SCORE: 4
6. BECOMING EASILY ANNOYED OR IRRITABLE: NOT AT ALL
2. NOT BEING ABLE TO STOP OR CONTROL WORRYING: SEVERAL DAYS
3. WORRYING TOO MUCH ABOUT DIFFERENT THINGS: SEVERAL DAYS

## 2024-09-16 ASSESSMENT — PATIENT HEALTH QUESTIONNAIRE - PHQ9
5. POOR APPETITE OR OVEREATING: SEVERAL DAYS
SUM OF ALL RESPONSES TO PHQ QUESTIONS 1-9: 4

## 2024-09-16 NOTE — PROGRESS NOTES
Assessment & Plan     Generalized anxiety disorder  No improvement with fluoxetine. Going to therapy, feels like a good fit. Is experiencing insomnia, has had this for years. No better or worse with fluoxetine. Having side effect of blunted affect. Taper off fluoxetine and start escitalopram after tapering off.  Follow-up in 4-6 weeks.  If tolerating, would increase escitalopram to 20 mg after 4 weeks at 10 mg  - FLUoxetine (PROZAC) 10 MG capsule; Take 2 capsules (20 mg) by mouth daily.  - escitalopram (LEXAPRO) 10 MG tablet; Take 5 mg (half of a pill) for 7 days, then 10 mg (1 pill) until our next follow up.    The longitudinal plan of care for the diagnosis(es)/condition(s) as documented were addressed during this visit. Due to the added complexity in care, I will continue to support Vikash in the subsequent management and with ongoing continuity of care.          Return in about 6 weeks (around 10/28/2024) for Follow up, with me.    Rhonda Suh is a 39 year old, presenting for the following health issues:  Follow Up (Hep b) and Recheck Medication        9/16/2024     8:19 AM   Additional Questions   Roomed by Annettein         9/16/2024    Information    services provided? No        HPI   Last visit 8/26/24 (3 weeks ago) increased from 10mg prozac to 20mg. We have been watching psychomotor agitation   Hasn't noticed any improvement in mood  No increased psychomotor agitation    Phq=4  NICA = 4          3/31/2022    10:27 AM 8/18/2022     2:45 PM 9/16/2024     9:05 AM   NICA-7 SCORE   Total Score 6 (mild anxiety) 9 (mild anxiety)    Total Score 6 9 4           7/15/2024    10:13 AM 8/26/2024     8:04 AM 9/16/2024     9:05 AM   PHQ   PHQ-9 Total Score 11 4 4   Q9: Thoughts of better off dead/self-harm past 2 weeks Not at all Not at all Not at all                         Objective    /64 (BP Location: Right arm, Patient Position: Sitting, Cuff Size: Adult Regular)   Pulse 69   Temp 98.1  " F (36.7  C) (Oral)   Resp 16   Ht 1.702 m (5' 7\")   SpO2 99%   BMI 18.45 kg/m    Body mass index is 18.45 kg/m .  Physical Exam     General: Alert and oriented, in no acute distress.  Skin: Warm and dry, no abnormalities noted.  Eyes: Extra-ocular muscles grossly intact, pupils equal.  ENT: Speech intact, nasal passages open, no hearing impairment noted.  CV: No cyanosis or pallor, warm and well perfused.  Respiratory: No respiratory distress, no accessory muscle use.  Neuro: Gait and station normal, comprehension intact. Gross and fine motor skills intact.   Psychiatric: Mood and affect appear normal.   Extremities: Warm, able to move all four extremities at will.              Signed Electronically by: Uma Walton MD    "

## 2024-09-16 NOTE — PATIENT INSTRUCTIONS
Patient Education   Here is the plan from today's visit    1. Generalized anxiety disorder  - Taper off fluoxetine (prozac): decrease to 10mg for 7 days, then off  - Once off the fluoxetine, Start escitalopram (lexapro) half a pill for 7 days, then 1 pill until our next follow up      Follow up plan  No follow-ups on file.    Thank you for coming to [facility] today.  Lab Testing:  **If you had lab testing today and your results are reassuring or normal they will be mailed to you or sent through [employer] within 7 days.   **If the lab tests need quick action we will call you with the results.  **If you are having labs done on a different day, please call [phone] to schedule at [facility] or [phone] for other [facility] Outpatient Lab locations. Labs do not offer walk-in appointments.  The phone number we will call with results is # [phone] (home) . If this is not the best number please call our clinic and change the number.  Medication Refills:  If you need any refills please call your pharmacy and they will contact us.   If you need to  your refill at a new pharmacy, please contact the new pharmacy directly. The new pharmacy will help you get your medications transferred faster.   Scheduling:  If you have any concerns about today's visit or wish to schedule another appointment please call our office during normal business hours [phone] (8-5:00 M-F). If you can no longer make a scheduled visit, please cancel via [employer] or call us to cancel.   If a referral was made to an [facility] specialty provider and you do not get a call from central scheduling, please refer to directions on your visit summary or call our office during normal business hours for assistance.   If a Mammogram was ordered for you at the [facility] call [phone] to schedule or change your appointment.  If you had an XRay/CT/Ultrasound/MRI ordered the number is [phone] to schedule or change  your radiology appointment.   [facility] has limited ultrasound appointments available on Wednesdays, if you would like your ultrasound at [facility], please call [phone] to schedule.   Medical Concerns:  If you have urgent medical concerns please call [phone] at any time of the day.    Uma Walton MD

## 2024-10-03 ENCOUNTER — THERAPY VISIT (OUTPATIENT)
Dept: PHYSICAL THERAPY | Facility: CLINIC | Age: 39
End: 2024-10-03
Payer: COMMERCIAL

## 2024-10-03 DIAGNOSIS — M25.571 BILATERAL ANKLE PAIN, UNSPECIFIED CHRONICITY: Primary | ICD-10-CM

## 2024-10-03 DIAGNOSIS — M25.572 BILATERAL ANKLE PAIN, UNSPECIFIED CHRONICITY: Primary | ICD-10-CM

## 2024-10-03 PROCEDURE — 97530 THERAPEUTIC ACTIVITIES: CPT | Mod: GP

## 2024-10-03 PROCEDURE — 97110 THERAPEUTIC EXERCISES: CPT | Mod: GP

## 2024-10-03 PROCEDURE — 97112 NEUROMUSCULAR REEDUCATION: CPT | Mod: GP

## 2024-10-11 ENCOUNTER — OFFICE VISIT (OUTPATIENT)
Dept: FAMILY MEDICINE | Facility: CLINIC | Age: 39
End: 2024-10-11
Payer: COMMERCIAL

## 2024-10-11 VITALS
HEART RATE: 65 BPM | OXYGEN SATURATION: 98 % | DIASTOLIC BLOOD PRESSURE: 60 MMHG | RESPIRATION RATE: 16 BRPM | TEMPERATURE: 98.1 F | SYSTOLIC BLOOD PRESSURE: 94 MMHG | HEIGHT: 67 IN | BODY MASS INDEX: 18.45 KG/M2

## 2024-10-11 DIAGNOSIS — F41.1 GENERALIZED ANXIETY DISORDER: Primary | ICD-10-CM

## 2024-10-11 DIAGNOSIS — F33.1 MODERATE EPISODE OF RECURRENT MAJOR DEPRESSIVE DISORDER (H): ICD-10-CM

## 2024-10-11 DIAGNOSIS — M67.949: ICD-10-CM

## 2024-10-11 DIAGNOSIS — M79.642 HAND PAIN, LEFT: ICD-10-CM

## 2024-10-11 PROCEDURE — 99214 OFFICE O/P EST MOD 30 MIN: CPT | Mod: GC

## 2024-10-11 NOTE — PROGRESS NOTES
Assessment & Plan   Generalized anxiety disorder  Moderate episode of recurrent major depressive disorder (H)  Switched from Prozac 20mg to Lexparo 10mg last visit (9/16/24). Mood is significantly improved on Lexapro. However, noticed that he is more fatigued on the Lexapro. Will continue on the 10mg dose for now. We discussed that he can titrate down to 5mg if the fatigue does not improve in 1-2 weeks. If he does titrate down and anxiety symptoms resume, can increase back up to 10mg dose.     Tendinopathy of left pointer finger  Acute, consistent with flexor tendinosis with tenderness along the A2 and A3 pulley. Likely 2/2 overuse. Could also be early trigger finger - describes it as resistance rather than triggering and could only elicit a slight snap on exam. Recommend supportive cares including temporary rest from guitar playing and climbing, tylenol PRN, and ice/heat. Will refer to hand therapy today as well.   - Occupational Therapy  Referral; Future    Follow-up in 4-8 weeks.     Subjective   Vikash is a 39 year old, presenting for the following health issues:  Follow Up (Medication follow up ) and Joint Pain (Pt is c/o joint pain in fingers )        9/16/2024     8:19 AM   Additional Questions   Roomed by Areli GALVEZ     - Last visit (9/16/24) switched from 20mg prozac to 10mg lexapro.  Anxiety and depression is much improved since switching to escitalopram. Feels happier, has more motivation and friends have noticed that he seems happier. He is still going to therapy.   - He has noticed fatigue and feeling very tired. Wondering about cutting back on the dose?     - Bilateral join pain left pointer finger, left worse than right. The pain has been going on for 2-3 weeks and has been getting worse. 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 He notices resistance when flexing his L pointer finger and pain upon flexion, especially when climbing.  No swelling. No fevers, chills. No other  "joint involvement except hands. No recent trauma.  Family history significant for osetoarthritis in mom and grandma but no other autoimmune diseases.         3/31/2022    10:27 AM 8/18/2022     2:45 PM 9/16/2024     9:05 AM   NICA-7 SCORE   Total Score 6 (mild anxiety) 9 (mild anxiety)    Total Score 6 9 4           7/15/2024    10:13 AM 8/26/2024     8:04 AM 9/16/2024     9:05 AM   PHQ   PHQ-9 Total Score 11 4 4   Q9: Thoughts of better off dead/self-harm past 2 weeks Not at all Not at all Not at all         Objective    BP 94/60   Pulse 65   Temp 98.1  F (36.7  C) (Oral)   Resp 16   Ht 1.702 m (5' 7\")   SpO2 98%   BMI 18.45 kg/m    Body mass index is 18.45 kg/m .  Physical Exam   GENERAL: alert and no distress  MS:  LEFT HAND  No deformities, no swelling, no erythema  No tenderness to palpation MCP, PIP, DIP. Tenderness to palpation on A2 and A3 pulley on L pointer finger. Slight snapping of A1 pulley with resisted flexion of pointer finger  ROM: full  Strength: 5/5 but pain with resisted flexion at the left pointer PIP joint  PSYCH: mentation appears normal, affect normal/bright      Patient seen and discussed with Attending Physician and Resident.  Abigail Reyes, MS3  Medical Student     Resident/Fellow Attestation   I, Uma Walton MD, was present with the medical/[employer] student who participated in the service and in the documentation of the note.  I have verified the history and personally performed the physical exam and medical decision making.  I agree with the assessment and plan of care as documented in the note.      Uma Walton MD  PGY3      "

## 2024-10-11 NOTE — PATIENT INSTRUCTIONS
Patient Education   Here is the plan from today's visit    1. Generalized anxiety disorder  We are so glad that your mood has been improving on the Lexapro! We will continue on the Lexapro 10mg. If you notice that you are still have fatigue in 1-2 weeks, can decrease to 5mg. We discussed that the 10mg is the therapeutic dose of Lexapro, so some anxiety may return if you decrease the dose. If you decrease the dose and notice these changes in mood, increase back to 10mg dose.     2. Tendinopathy of left pointer  The pain in your fingers is likely due to tendinosis from continual use. We discussed that you can use supportive therapies, like a splint, tylenol, and ice/heat to help with the pain. We are referring you to hand therapy today.           Please call or return to clinic if your symptoms don't go away.    Follow up plan  Return in about 4 weeks (around 11/8/2024).    Thank you for coming to [facility] today.  Lab Testing:  **If you had lab testing today and your results are reassuring or normal they will be mailed to you or sent through [employer] within 7 days.   **If the lab tests need quick action we will call you with the results.  **If you are having labs done on a different day, please call [phone] to schedule at [facility] or [phone] for other [facility] Outpatient Lab locations. Labs do not offer walk-in appointments.  The phone number we will call with results is # [phone] (home) . If this is not the best number please call our clinic and change the number.  Medication Refills:  If you need any refills please call your pharmacy and they will contact us.   If you need to  your refill at a new pharmacy, please contact the new pharmacy directly. The new pharmacy will help you get your medications transferred faster.   Scheduling:  If you have any concerns about today's visit or wish to schedule another appointment please call our office during normal business hours  [phone] (8-5:00 M-F). If you can no longer make a scheduled visit, please cancel via [employer] or call us to cancel.   If a referral was made to an [facility] specialty provider and you do not get a call from central scheduling, please refer to directions on your visit summary or call our office during normal business hours for assistance.   If a Mammogram was ordered for you at the [facility] call [phone] to schedule or change your appointment.  If you had an XRay/CT/Ultrasound/MRI ordered the number is [phone] to schedule or change your radiology appointment.   [facility] has limited ultrasound appointments available on Wednesdays, if you would like your ultrasound at [facility], please call [phone] to schedule.   Medical Concerns:  If you have urgent medical concerns please call [phone] at any time of the day.    Uma Walton MD

## 2024-10-23 ENCOUNTER — MYC REFILL (OUTPATIENT)
Dept: FAMILY MEDICINE | Facility: CLINIC | Age: 39
End: 2024-10-23
Payer: COMMERCIAL

## 2024-10-23 DIAGNOSIS — F41.1 GENERALIZED ANXIETY DISORDER: ICD-10-CM

## 2024-10-24 NOTE — TELEPHONE ENCOUNTER
"Request for medication refill:  escitalopram (LEXAPRO) 10 MG tablet     Providers if patient needs an appointment and you are willing to give a one month supply please refill for one month and  send a letter/MyChart using \".SMILLIMITEDREFILL\" .smillimited and route chart to \"P [facility] \" (Giving one month refill in non controlled medications is strongly recommended before denial)    If refill has been denied, meaning absolutely no refills without visit, please complete the smart phrase \".smirxrefuse\" and route it to the \"P [facility] MED REFILLS\"  pool to inform the patient and the pharmacy.    Annette Montgomery, CMA      "

## 2024-10-25 RX ORDER — ESCITALOPRAM OXALATE 10 MG/1
TABLET ORAL
Qty: 40 TABLET | Refills: 0 | Status: SHIPPED | OUTPATIENT
Start: 2024-10-25

## 2024-10-28 ENCOUNTER — MYC MEDICAL ADVICE (OUTPATIENT)
Dept: FAMILY MEDICINE | Facility: CLINIC | Age: 39
End: 2024-10-28
Payer: COMMERCIAL
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
## 2024-10-29 ENCOUNTER — OFFICE VISIT (OUTPATIENT)
Dept: FAMILY MEDICINE | Facility: CLINIC | Age: 39
End: 2024-10-29
Payer: COMMERCIAL

## 2024-10-29 VITALS
RESPIRATION RATE: 16 BRPM | WEIGHT: 121.8 LBS | TEMPERATURE: 98.4 F | BODY MASS INDEX: 19.12 KG/M2 | HEART RATE: 78 BPM | SYSTOLIC BLOOD PRESSURE: 101 MMHG | OXYGEN SATURATION: 100 % | HEIGHT: 67 IN | DIASTOLIC BLOOD PRESSURE: 67 MMHG

## 2024-10-29 DIAGNOSIS — Z23 COVID-19 VACCINE ADMINISTERED: Primary | ICD-10-CM

## 2024-10-29 DIAGNOSIS — S30.861A TICK BITE OF ABDOMEN, INITIAL ENCOUNTER: ICD-10-CM

## 2024-10-29 DIAGNOSIS — R03.1 LOW BLOOD PRESSURE READING: ICD-10-CM
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
## 2024-10-29 PROCEDURE — 99213 OFFICE O/P EST LOW 20 MIN: CPT | Mod: 25

## 2024-10-29 PROCEDURE — 91320 SARSCV2 VAC 30MCG TRS-SUC IM: CPT

## 2024-10-29 PROCEDURE — 90480 ADMN SARSCOV2 VAC 1/ONLY CMP: CPT

## 2024-10-29 NOTE — PROGRESS NOTES
Assessment & Plan     Tick bite of abdomen, initial encounter  Reports tick bite approximately two weeks ago by a dark tick for less than 12 hours. Review pictures of tick with patient and unlikely to be ixodes tick. Examination reassuring. Given duration since tick bite and no erythema marginatum, fever, chills, or muscle/joint pain, no indication for prophylaxis or treatment at this time. Continue to monitor for symptoms or development of rash. Has a follow up appt with Dr. Walton no 11/14.    Low blood pressure reading  Patient has history of lower BP in 90s/60s. BP at 101/67 today. Noted to have donated plasma twice in a week and was declined donation after diastolic BP < 60. Asymptomatic. Recommended staying hydrated and reducing frequency of plasma donations. Also ensuring enough iron in his diet as he is primarily vegetarian.     COVID-19 vaccine administered  - COVID-19 12+ (PFIZER)      Return in about 16 days (around 11/14/2024).    Subjective   Vikash is a 39 year old, presenting for the following health issues:  OTHER (Check tick bite, talk about bp)        10/29/2024    10:45 AM   Additional Questions   Roomed by Ohn   Accompanied by Self         10/29/2024    Information    services provided? No        HPI     Where were you when you were bit? [facility] in [place]  What date were you bit/did you find the tick? About 2 weeks ago  How long was it attached? Less than 12 hours  Was the tick engorged? No  Do you know what kind of tick it was? Small, not the smallest he has seen    - Slightly difficult to remove but all intact when removed by hand, continued to move around after removing  - Small leticia on abdomen in periumbilical region where tick was attached, has lightened in color, small scab noted  - Persistence of leticia is reason for visit today  - no fever, chills, body aches, no bullseye rash noted    Blood pressure:  - donated plasma twice last week which is new for him  -  "third time was too low - 90/59  - trying to stay hydrated  - in the past has had lower BP  - occasional orthostatic symptoms but not out of the ordinary dizziness or anxiey  - started taking lexapro    Sodium:  - low- low normal  - doesn't eat a lot of salt  - eats more chips/crackers      Review of Systems  Constitutional, HEENT, cardiovascular, and pulmonary systems are negative, except as otherwise noted.      Objective    /67   Pulse 78   Temp 98.4  F (36.9  C) (Oral)   Resp 16   Ht 1.702 m (5' 7\")   Wt 55.2 kg (121 lb 12.8 oz)   SpO2 100%   BMI 19.08 kg/m    Body mass index is 19.08 kg/m .  Physical Exam   GENERAL: alert and no distress  RESP: lungs clear to auscultation - no rales, rhonchi or wheezes  CV: regular rate and rhythm, normal S1 S2, no S3 or S4, no murmur, click or rub, no peripheral edema  MS: no gross musculoskeletal defects noted, no edema  SKIN: no suspicious lesions or rashes and erythema - abdomen Tick bite site below.               Shilpa Yang, MS3  [employer]  October 29, 2024 10:49 AM     I was present with the medical student who participated in the service and in the documentation of this note. I have verified the history and personally performed the physical exam and medical decision making, and have verified the content of the note, which accurately reflects my assessment of the patient and the plan of care.   Teodoro Arriaga MD     Signed Electronically by: Teodoro Arriaga MD    "

## 2024-10-29 NOTE — PATIENT INSTRUCTIONS
Patient Education   Here is the plan from today's visit    1. Tick bite of abdomen, initial encounter  Continue to monitor for rashes.Return to clinic if you develop fever, muscle aches, or joint pains.     2. Low blood pressure reading  Reassuring blood pressure today. Continue adequate hydration.     3. COVID-19 vaccine administered (Primary)  - COVID-19 12+ (PFIZER)          Please call or return to clinic if your symptoms don't go away.    Follow up plan  Return in about 16 days (around 11/14/2024).    Thank you for coming to [facility] today.  Lab Testing:  **If you had lab testing today and your results are reassuring or normal they will be mailed to you or sent through [employer] within 7 days.   **If the lab tests need quick action we will call you with the results.  **If you are having labs done on a different day, please call [phone] to schedule at [facility] or [phone] for other [facility] locations. Labs do not offer walk-in appointments.  The phone number we will call with results is # [phone] (home) . If this is not the best number please call our clinic and change the number.  Medication Refills:  If you need any refills please call your pharmacy and they will contact us.   If you need to  your refill at a new pharmacy, please contact the new pharmacy directly. The new pharmacy will help you get your medications transferred faster.   Scheduling:  If you have any concerns about today's visit or wish to schedule another appointment please call our office during normal business hours [phone] (8-5:00 M-F). If you can no longer make a scheduled visit, please cancel via [employer] or call us to cancel.   If a referral was made to an [facility] specialty provider and you do not get a call from central scheduling, please refer to directions on your visit summary or call our office during normal business hours for assistance.   If a Mammogram was ordered  for you at the [facility] Center call [phone] to schedule or change your appointment.  If you had an XRay/CT/Ultrasound/MRI ordered the number is [phone] to schedule or change your radiology appointment.   [facility] has limited ultrasound appointments available on Wednesdays, if you would like your ultrasound at [facility], please call [phone] to schedule.   Medical Concerns:  If you have urgent medical concerns please call [phone] at any time of the day.    Teodoro Arriaga MD

## 2024-10-31 RX ORDER — DOXYCYCLINE 100 MG/1
200 CAPSULE ORAL ONCE
Qty: 2 CAPSULE | Refills: 0 | Status: SHIPPED | OUTPATIENT
Start: 2024-10-31 | End: 2024-10-31

## 2024-10-31 NOTE — TELEPHONE ENCOUNTER
Tick-borne illness prophylaxis    Diagnoses and all orders for this visit:    Tick bite, unspecified site, initial encounter  -     doxycycline hyclate (VIBRAMYCIN) 100 MG capsule; Take 2 capsules (200 mg) by mouth once for 1 dose.      Uma Wlaton MD

## 2024-11-08 ENCOUNTER — THERAPY VISIT (OUTPATIENT)
Dept: PHYSICAL THERAPY | Facility: CLINIC | Age: 39
End: 2024-11-08
Payer: COMMERCIAL

## 2024-11-08 DIAGNOSIS — M25.571 BILATERAL ANKLE PAIN, UNSPECIFIED CHRONICITY: Primary | ICD-10-CM

## 2024-11-08 DIAGNOSIS — M25.572 BILATERAL ANKLE PAIN, UNSPECIFIED CHRONICITY: Primary | ICD-10-CM

## 2024-11-08 PROCEDURE — 97530 THERAPEUTIC ACTIVITIES: CPT | Mod: GP

## 2024-11-08 NOTE — PROGRESS NOTES
11/08/24 0500   Appointment Info   Signing clinician's name / credentials Elpidio Preciado PT   Total/Authorized Visits 8   Visits Used 4   Medical Diagnosis Unspecified Ankle pain   PT Tx Diagnosis bilateral ankle pain   Progress Note/Certification   Start of Care Date 08/29/24   Onset of illness/injury or Date of Surgery 07/17/24   Therapy Frequency 1x/week   Predicted Duration 12 weeks   Certification date from 08/29/24   Certification date to 11/21/24   Progress Note Due Date 11/21/24   Progress Note Completed Date 08/29/24   PT Goal 1   Goal Identifier walking   Goal Description Patient will be able to walk >30 minutes without an increase in symptoms   Rationale to maximize safety and independence with performance of ADLs and functional tasks   Goal Progress MET   Target Date 11/21/24   Date Met 11/08/24   PT Goal 2   Goal Identifier Yoga   Goal Description Patient will resume regular participation in yoga without an increase in symptoms   Rationale to maximize safety and independence with performance of ADLs and functional tasks   Goal Progress MET   Target Date 11/21/24   Date Met 11/08/24   Subjective Report   Subjective Report Patient reports ankles feeling normal. Has been able to go on trail runs with minimal soreness, and has gotten back to full participation in yoga as well.   Therapeutic Activity   Therapeutic Activities: dynamic activities to improve functional performance minutes (14541) 18   Ther Act 1 review of status, HEP, POC   Ther Act 1 - Details education on self-management, continuing with HEP as able to decrease risk of recurrence.   Skilled Intervention education   Patient Response/Progress verbalized understanding   Education   Learner/Method Patient;Demonstration;Pictures/Video   Plan   Home program PTRX on phone   Updates to plan of care D/C   Plan for next session N/A   Total Session Time   Timed Code Treatment Minutes 18   Total Treatment Time (sum of timed and untimed services) 18          DISCHARGE  Reason for Discharge: Patient has met all goals.    Equipment Issued: None    Discharge Plan: Patient to continue home program.    Referring Provider:  Stone Duvall

## 2024-11-14 ENCOUNTER — OFFICE VISIT (OUTPATIENT)
Dept: FAMILY MEDICINE | Facility: CLINIC | Age: 39
End: 2024-11-14
Payer: COMMERCIAL

## 2024-11-14 VITALS
HEART RATE: 73 BPM | RESPIRATION RATE: 15 BRPM | SYSTOLIC BLOOD PRESSURE: 98 MMHG | BODY MASS INDEX: 19.08 KG/M2 | DIASTOLIC BLOOD PRESSURE: 59 MMHG | TEMPERATURE: 97.9 F | HEIGHT: 67 IN | OXYGEN SATURATION: 100 %
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
## 2024-11-14 DIAGNOSIS — F41.1 GENERALIZED ANXIETY DISORDER: Primary | ICD-10-CM

## 2024-11-14 DIAGNOSIS — F33.1 MODERATE EPISODE OF RECURRENT MAJOR DEPRESSIVE DISORDER (H): ICD-10-CM

## 2024-11-14 DIAGNOSIS — S61.452A DOG BITE OF LEFT HAND WITHOUT COMPLICATION, INITIAL ENCOUNTER: ICD-10-CM

## 2024-11-14 ASSESSMENT — PATIENT HEALTH QUESTIONNAIRE - PHQ9
10. IF YOU CHECKED OFF ANY PROBLEMS, HOW DIFFICULT HAVE THESE PROBLEMS MADE IT FOR YOU TO DO YOUR WORK, TAKE CARE OF THINGS AT HOME, OR GET ALONG WITH OTHER PEOPLE: SOMEWHAT DIFFICULT
SUM OF ALL RESPONSES TO PHQ QUESTIONS 1-9: 4
SUM OF ALL RESPONSES TO PHQ QUESTIONS 1-9: 4

## 2024-11-14 ASSESSMENT — ANXIETY QUESTIONNAIRES
GAD7 TOTAL SCORE: 2
2. NOT BEING ABLE TO STOP OR CONTROL WORRYING: NOT AT ALL
5. BEING SO RESTLESS THAT IT IS HARD TO SIT STILL: NOT AT ALL
GAD7 TOTAL SCORE: 2
GAD7 TOTAL SCORE: 2
3. WORRYING TOO MUCH ABOUT DIFFERENT THINGS: NOT AT ALL
7. FEELING AFRAID AS IF SOMETHING AWFUL MIGHT HAPPEN: NOT AT ALL
IF YOU CHECKED OFF ANY PROBLEMS ON THIS QUESTIONNAIRE, HOW DIFFICULT HAVE THESE PROBLEMS MADE IT FOR YOU TO DO YOUR WORK, TAKE CARE OF THINGS AT HOME, OR GET ALONG WITH OTHER PEOPLE: SOMEWHAT DIFFICULT
8. IF YOU CHECKED OFF ANY PROBLEMS, HOW DIFFICULT HAVE THESE MADE IT FOR YOU TO DO YOUR WORK, TAKE CARE OF THINGS AT HOME, OR GET ALONG WITH OTHER PEOPLE?: SOMEWHAT DIFFICULT
7. FEELING AFRAID AS IF SOMETHING AWFUL MIGHT HAPPEN: NOT AT ALL
4. TROUBLE RELAXING: SEVERAL DAYS
1. FEELING NERVOUS, ANXIOUS, OR ON EDGE: SEVERAL DAYS
6. BECOMING EASILY ANNOYED OR IRRITABLE: NOT AT ALL

## 2024-11-14 ASSESSMENT — PAIN SCALES - GENERAL: PAINLEVEL_OUTOF10: NO PAIN (0)

## 2024-11-14 NOTE — PROGRESS NOTES
Preceptor Attestation:    I discussed the patient with the resident and evaluated the patient in person. I have verified the content of the note, which accurately reflects my assessment of the patient and the plan of care.   Supervising Physician:  Krishna Alonzo MD, MD.

## 2024-11-14 NOTE — PROGRESS NOTES
"  Assessment & Plan     Generalized anxiety disorder  Moderate episode of recurrent major depressive disorder (H)  Chronic, in partial remission (PHQ9 =4, gad7 =2) on escitalopram 10mg daily, have been trying different SSRIs. Switched from prozac to escitalopram 8/26/24 due to very little improvement in mood and increased psychomotor agitation and flattening of affect. Titrated lexapro up to 10mg but has been having significant fatigue even after 2 months. He would like to try stopping escitalopram and seeing how he feels.   - Stop escitalopram - decrease to 5mg for 1 week then stop  - Continue therapy  - Follow up with me in 4 weeks  - Mychart message me and restart escitalopram if symptoms get worse    Dog bite of left hand without complication, initial encounter  Acute, 2 days ago. Domesticated dog presumably up to date on rabies vaccine. No signs of infection today. Prophylaxis with Td vaccine and augmentin, ED if signs of infection or if he finds out dog is not vaccinated for rabies.   - amoxicillin-clavulanate (AUGMENTIN) 875-125 MG tablet; Take 1 tablet by mouth 2 times daily for 4 days.        Return in about 4 weeks (around 12/12/2024) for Follow up, with me.    Subjective   Vikash is a 39 year old, presenting for the following health issues:  Recheck Medication (Med Check)      11/14/2024     8:26 AM   Additional Questions   Roomed by Javier   Accompanied by Self         11/14/2024    Information    services provided? No      HPI   Since last visit in October, he has been taking escitalopram 10 mg daily.  His mood is rated as \"good\" and he is experiencing fewer life stressors compared to when he started.  However, he is still having significant fatigue.  He has never needed to take naps before, but now feels constantly tired and always like he needs to sleep.   He has been going to therapy, good therapeutic relationship.           8/26/2024     8:04 AM 9/16/2024     9:05 AM 11/14/2024    " " 8:21 AM   PHQ   PHQ-9 Total Score 4 4 4    Q9: Thoughts of better off dead/self-harm past 2 weeks Not at all  Not at all Not at all        Patient-reported          8/18/2022     2:45 PM 9/16/2024     9:05 AM 11/14/2024     8:22 AM   NICA-7 SCORE   Total Score 9 (mild anxiety)  2 (minimal anxiety)   Total Score 9 4 2        Patient-reported         Objective    BP 98/59 (BP Location: Left arm, Patient Position: Sitting, Cuff Size: Adult Regular)   Pulse 73   Temp 97.9  F (36.6  C) (Oral)   Resp 15   Ht 1.702 m (5' 7\")   SpO2 100%   BMI 19.08 kg/m    Body mass index is 19.08 kg/m .  Physical Exam     General: Alert and oriented, in no acute distress.  Skin: Warm and dry, no abnormalities noted.  Eyes: Extra-ocular muscles grossly intact, pupils equal.  ENT: Speech intact, nasal passages open, no hearing impairment noted.  CV: No cyanosis or pallor, warm and well perfused.  Respiratory: No respiratory distress, no accessory muscle use.  Neuro: Gait and station normal, comprehension intact. Gross and fine motor skills intact.   Psychiatric: Mood and affect appear normal.   Extremities: Warm, able to move all four extremities at will.              Signed Electronically by: Uma Walton MD    Answers submitted by the patient for this visit:  Patient Health Questionnaire (Submitted on 11/14/2024)  If you checked off any problems, how difficult have these problems made it for you to do your work, take care of things at home, or get along with other people?: Somewhat difficult  PHQ9 TOTAL SCORE: 4  Patient Health Questionnaire (G7) (Submitted on 11/14/2024)  NICA 7 TOTAL SCORE: 2    "

## 2024-12-03 NOTE — PROGRESS NOTES
OCCUPATIONAL THERAPY EVALUATION  Type of Visit: Evaluation    See electronic medical record for Abuse and Falls Screening details.    Subjective   Presenting condition or subjective complaint:  Bilateral hand pain  Date of onset: October 2024    Relevant medical history:   No past medical history on file.    Dates & types of surgery:   Past Surgical History:   Procedure Laterality Date    HC PSYCHOTHERAPY W PATIENT 38-52 MINUTES  5/12/2022    DE PSYCHOTHERAPY W PATIENT 38-52 MINUTES  4/14/2022    DE PSYCHOTHERAPY W PATIENT 38-52 MINUTES  4/21/2022     Prior diagnostic imaging/testing results: None relevant  Prior therapy history for the same diagnosis, illness or injury: Hand therapy in 2022 for a different condition    Prior level of function:  Transfers: Independent  Ambulation: Independent  ADL: Independent  IADL: Independent    Mechanism of injury: Patient reports bilateral index fingers are aggravated by guitar playing and rock climbing. Initially had pain at the A2 and A3 pulleys bilaterally. No report of triggering. Currently, patient reports stiffness and soreness in bilateral index fingers. Notes reduced ROM in hook fist.  Living environment: Patient is independent at home and there are no concerns about accessibility and mobility in the home.    Employment:    Hobbies/Interests: Climbing, guitar    Patient goals for therapy: To have less pain and stiffness in fingers    Objective   Right hand dominant  Patient reports symptoms of pain and stiffness/loss of motion    Pain Level (Scale 0-10)   12/3/2024   At Rest 0-2/10   With Use 2-3/10     Pain Description  Date 12/3/2024   Location Dorsum of index, volar aspect of PIP joint   Pain Quality Discomfort   Frequency intermittent     Pain is worst  Daytime   Exacerbated by Rock climbing, guitar playing, clicking computer mouse, typing   Relieved by Break from climbing, wore finger splint intermittently   Progression Gradually improving     Sensation    Within normal limits throughout radial, median, and ulnar nerve distributions per patient report.    Edema  None noted on exam.     ROM  Patient is able to fully extend fingers and make a full composite fist.    Special Tests  Key: -/+ indicates absence/presence    12/4/2024 12/4/2024     Left Right   Oblique retinacular tightness test    Testing:  PIP in full flexion, assess passive DIP joint flexion  PIP in full extension, assess passive DIP joint flexion  If less DIP flexion with PIP extended, ORL is tight   Not tested Not tested   Interosseous tightness test    Testing:  Flex MP, assess amount of passive PIP flexion  HE MP, check passive PIP flexion  If less PIP flexion with MP in HE, there is intrinsic tightness (could be lumbrical)   Present  105 actively, 85 passively Present  100 actively, 90 passively   Lumbrical tightness test    Testing:  Check active PIP/DIP flexion with MP extended  Check passive PIP/DIP flexion with MP extended  If passive finger ROM is normal and there is no interosseous tightness, then assume lumbrical tightness   Tightness passively and actively Tightness passively and actively   Extrinsic flexor tightness test   Absent Absent   Extrinsic extensor tightness test   Absent Absent     Stage of Stenosing Tenosynovitis (SST)     12/3/2024   Index Finger R: Normal  L: Normal   Stage 1:  Normal  Stage 2:  Uneven motion of tendon  Stage 3:  Triggering, clicking, catching  Stage 4:  Locking in extension or flexion; unlocked by active motion  Stage 5:  Locking in extension or flexion; unlocked by passive motion  Stage 6:  Finger locked in extension or flexion    Strength   (Measured in pounds)  Pain Report:  - none  + mild    ++ moderate    +++ severe    12/3/2024 12/3/2024   Trials Left Right   1   74 78     Palpation  Pain Report:  - none  + mild    ++ moderate    +++ severe   Index Finger 12/3/2024   A1 Pulley R: -   L: -   A2 Pulley R: Painful at injury onset  L: Painful at injury  onset   A3 Pulley/PIP R: -  L: -   FA Extensors          Assessment & Plan   CLINICAL IMPRESSIONS  Medical Diagnosis: Bilateral hand pain    Treatment Diagnosis: Bilateral hand pain    Impression/Assessment: Pt is a 39 year old male presenting to Occupational Therapy due to the above condition.  The following significant findings have been identified: Impaired activity tolerance, Impaired ROM, and Pain.  These identified deficits interfere with their ability to perform work tasks, recreational activities, household chores, and meal planning and preparation as compared to previous level of function.   Patient's limitations or Problem List includes: Pain and Decreased ROM/motion of the bilateral hand which interferes with the patient's ability to perform Work Tasks, Recreational Activities, and Household Chores as compared to previous level of function.    Clinical Decision Making (Complexity):  Assessment of Occupational Performance: 3-5 Performance Deficits  Occupational Performance Limitations: home establishment and management, meal preparation and cleanup, work, and leisure activities  Clinical Decision Making (Complexity): Moderate complexity    PLAN OF CARE  Treatment Interventions:  Modalities:  US and Paraffin  Therapeutic Exercise:  AROM, AAROM, PROM, Tendon Gliding, Extensor Tracking, Isotonics, Isometrics, and Stabilization  Neuromuscular re-education:  Nerve Gliding and Kinesiotaping  Manual Techniques:  Myofascial release  Orthotic Fabrication:  As indicated  Self Care:  Ergonomic Considerations    Long Term Goals   OT Goal 1  Goal Identifier: Work  Goal Description: Patient able to type and mouse with 1/10 hand pain or less.  Rationale: In order to maximize safety and independence with ADL/IADLs  Target Date: 03/03/25  OT Goal 2  Goal Identifier: Leisure activities  Goal Description: Patient able to play guitar for 30 minutes with 1/10 pain or less.  Rationale: In order to maximize safety and  independence with ADL/IADLs  Target Date: 03/03/25      Frequency of Treatment: 2x/month  Duration of Treatment: 3 months     Recommended Referrals to Other Professionals:  N/A  Education Assessment: Learner/Method: Patient;No Barriers to Learning     Risks and benefits of evaluation/treatment have been explained.   Patient/Family/caregiver agrees with Plan of Care.     Evaluation Time:    OT Eval, Moderate Complexity Minutes (98693): 40    Signing Clinician: JAQUELINE Tirado [facility]                                                                                   OUTPATIENT OCCUPATIONAL THERAPY      PLAN OF TREATMENT FOR OUTPATIENT REHABILITATION   Patient's Last Name, First Name, BLAISE PenalozaVikash thomason    YOB: 1985   Provider's Name   [facility]   Medical Record No.  [id number]     Onset Date: 10/11/24 (Order date) Start of Care Date: 12/04/24     Medical Diagnosis:  Bilateral hand pain      OT Treatment Diagnosis:  Bilateral hand pain Plan of Treatment  Frequency/Duration:2x/month/3 months    Certification date from 12/04/24   To 03/03/25        See note for plan of treatment details and functional goals     Felicity Land OT                         I CERTIFY THE NEED FOR THESE SERVICES FURNISHED UNDER        THIS PLAN OF TREATMENT AND WHILE UNDER MY CARE     (Physician attestation of this document indicates review and certification of the therapy plan).              Referring Provider:  Shayna Glass MD    Initial Assessment  See Epic Evaluation- 12/04/24

## 2024-12-04 ENCOUNTER — THERAPY VISIT (OUTPATIENT)
Dept: OCCUPATIONAL THERAPY | Facility: CLINIC | Age: 39
End: 2024-12-04
Payer: COMMERCIAL

## 2024-12-04 DIAGNOSIS — M79.642 BILATERAL HAND PAIN: Primary | ICD-10-CM

## 2024-12-04 DIAGNOSIS — M79.641 BILATERAL HAND PAIN: Primary | ICD-10-CM

## 2024-12-04 PROCEDURE — 97110 THERAPEUTIC EXERCISES: CPT | Mod: GO | Performed by: OCCUPATIONAL THERAPIST

## 2024-12-04 PROCEDURE — 97166 OT EVAL MOD COMPLEX 45 MIN: CPT | Mod: GO | Performed by: OCCUPATIONAL THERAPIST

## 2025-03-31 ENCOUNTER — OFFICE VISIT (OUTPATIENT)
Dept: FAMILY MEDICINE | Facility: CLINIC | Age: 40
End: 2025-03-31
Payer: COMMERCIAL

## 2025-03-31 VITALS
TEMPERATURE: 97.8 F | HEART RATE: 66 BPM | HEIGHT: 67 IN | BODY MASS INDEX: 19.21 KG/M2 | RESPIRATION RATE: 14 BRPM | DIASTOLIC BLOOD PRESSURE: 62 MMHG | WEIGHT: 122.4 LBS | SYSTOLIC BLOOD PRESSURE: 95 MMHG | OXYGEN SATURATION: 100 %

## 2025-03-31 DIAGNOSIS — R19.6 HALITOSIS: Primary | ICD-10-CM

## 2025-03-31 DIAGNOSIS — L84 CALLUS OF FOOT: ICD-10-CM

## 2025-03-31 NOTE — PROGRESS NOTES
Assessment & Plan     There are no diagnoses linked to this encounter.        {FOLLOW UP PLANS (Optional) Includes COVID19 Treatment Plan:061167}    No follow-ups on file.    Subjective   Vikash is a 40 year old, presenting for the following health issues:  Foot Problems (Callus ), Mouth Problem (Patient was told there has been some changes in his breath (odor). Pt has not has his tonsils checked, unaware of any current cavities but has had some in the past. Believes it may be a GI issue. ), Fatigue, and Medication Update (Tapering off of lexapro )        3/31/2025    11:01 AM   Additional Questions   Roomed by Indra         3/31/2025    Information    services provided? No     HPI     Here to discuss multiple health concerns as listed above. Would like to prioritize ***    # Changes in breath odor  - Partner has noticed that there are changes in breath odor. Has a dental appt coming up too.   - Suspects that it might be GI related  - Throat gets scratchy easily, tressa when talking a lot. As of a few months ago. Did not used to happen in the past.   - Partner noticed a burning sensation on her tongue after kissing. Suspects that it might be because pt's breath odor has changed  - Noticed about a week ago.   - Increased dental hygiene but still feels it might be going  - Denies acid reflex symptoms.   - Gets full very fast - not new. And stays full for a few hours  - Did not notice anything with weight  - Looser Bms after starting Lexapro. However, currently tapering down. Now taking 5mg tablet daily   - Has noticed that he has to turn head to the right to easily swallow liquids x15 years.   - Upcoming appt w/ Dr. Walton on 4/16  - Diet: eating more stir cadet veggies. Started eating meat regularly after about a decade   - Stopped eating diary after increased flatulence   - Mouth dry intermittently. Typically does not resolve after hydrating  - Used to smoke/ vape marijuana for many years. Stopped in June  "2024.   - No recent URI/ cough    # Foot callus  - Discussed callus removal a home + vaseline + provided instructions in AVS    # Lexapro  - Tapering down lexapro      {MA/LPN/RN Pre-Provider Visit Orders- hCG/UA/Strep (Optional):590524}  {SUPERLIST (Optional):673882}  {additonal problems for provider to add (Optional):171455}    {ROS Picklists (Optional):534995}      Objective    BP 95/62   Pulse 66   Temp 97.8  F (36.6  C) (Temporal)   Resp 14   Ht 1.702 m (5' 7\")   Wt 55.5 kg (122 lb 6.4 oz)   SpO2 100%   BMI 19.17 kg/m      Physical Exam   {Exam List (Optional):284363}    {Diagnostic Test Results (Optional):109836}      Signed Electronically by: Elena Storm MD  "

## 2025-04-10 ENCOUNTER — OFFICE VISIT (OUTPATIENT)
Dept: FAMILY MEDICINE | Facility: CLINIC | Age: 40
End: 2025-04-10
Payer: COMMERCIAL

## 2025-04-10 VITALS
TEMPERATURE: 97.9 F | HEIGHT: 67 IN | BODY MASS INDEX: 19.17 KG/M2 | SYSTOLIC BLOOD PRESSURE: 102 MMHG | RESPIRATION RATE: 16 BRPM | DIASTOLIC BLOOD PRESSURE: 67 MMHG | HEART RATE: 68 BPM | OXYGEN SATURATION: 99 %

## 2025-04-10 DIAGNOSIS — K21.9 GASTROESOPHAGEAL REFLUX DISEASE, UNSPECIFIED WHETHER ESOPHAGITIS PRESENT: ICD-10-CM

## 2025-04-10 DIAGNOSIS — R68.81 EARLY SATIETY: Primary | ICD-10-CM

## 2025-04-10 DIAGNOSIS — B37.0 ORAL THRUSH: ICD-10-CM

## 2025-04-10 DIAGNOSIS — R19.6 HALITOSIS: ICD-10-CM

## 2025-04-10 DIAGNOSIS — H81.10 BENIGN PAROXYSMAL POSITIONAL VERTIGO, UNSPECIFIED LATERALITY: ICD-10-CM

## 2025-04-10 PROCEDURE — 1126F AMNT PAIN NOTED NONE PRSNT: CPT

## 2025-04-10 PROCEDURE — 90746 HEPB VACCINE 3 DOSE ADULT IM: CPT

## 2025-04-10 PROCEDURE — 3078F DIAST BP <80 MM HG: CPT

## 2025-04-10 PROCEDURE — 3074F SYST BP LT 130 MM HG: CPT

## 2025-04-10 PROCEDURE — 90471 IMMUNIZATION ADMIN: CPT

## 2025-04-10 PROCEDURE — 99214 OFFICE O/P EST MOD 30 MIN: CPT | Mod: 25

## 2025-04-10 RX ORDER — NYSTATIN 100000 [USP'U]/ML
500000 SUSPENSION ORAL 4 TIMES DAILY
Qty: 140 ML | Refills: 0 | Status: SHIPPED | OUTPATIENT
Start: 2025-04-10 | End: 2025-04-17

## 2025-04-10 RX ORDER — OMEPRAZOLE 20 MG/1
20 CAPSULE, DELAYED RELEASE ORAL DAILY
Qty: 30 CAPSULE | Refills: 3 | Status: SHIPPED | OUTPATIENT
Start: 2025-04-10

## 2025-04-10 ASSESSMENT — ANXIETY QUESTIONNAIRES
2. NOT BEING ABLE TO STOP OR CONTROL WORRYING: NOT AT ALL
7. FEELING AFRAID AS IF SOMETHING AWFUL MIGHT HAPPEN: NOT AT ALL
GAD7 TOTAL SCORE: 1
GAD7 TOTAL SCORE: 1
8. IF YOU CHECKED OFF ANY PROBLEMS, HOW DIFFICULT HAVE THESE MADE IT FOR YOU TO DO YOUR WORK, TAKE CARE OF THINGS AT HOME, OR GET ALONG WITH OTHER PEOPLE?: NOT DIFFICULT AT ALL
3. WORRYING TOO MUCH ABOUT DIFFERENT THINGS: NOT AT ALL
5. BEING SO RESTLESS THAT IT IS HARD TO SIT STILL: NOT AT ALL
GAD7 TOTAL SCORE: 1
6. BECOMING EASILY ANNOYED OR IRRITABLE: NOT AT ALL
7. FEELING AFRAID AS IF SOMETHING AWFUL MIGHT HAPPEN: NOT AT ALL
4. TROUBLE RELAXING: NOT AT ALL
1. FEELING NERVOUS, ANXIOUS, OR ON EDGE: SEVERAL DAYS
IF YOU CHECKED OFF ANY PROBLEMS ON THIS QUESTIONNAIRE, HOW DIFFICULT HAVE THESE PROBLEMS MADE IT FOR YOU TO DO YOUR WORK, TAKE CARE OF THINGS AT HOME, OR GET ALONG WITH OTHER PEOPLE: NOT DIFFICULT AT ALL

## 2025-04-10 ASSESSMENT — PAIN SCALES - GENERAL: PAINLEVEL_OUTOF10: NO PAIN (0)

## 2025-04-10 NOTE — PROGRESS NOTES
Assessment & Plan     Early satiety  May be physiologic or due to H. Pylori.  If H. pylori negative then would consider an EGD  - Helicobacter pylori Antigen Stool; Future    Oral thrush  Characteristic lesions on exam and reports tingling/burning sensation.  Empiric treatment.  Uncertain why he would have oral thrush as he is immunocompetent.  Did not have time today, will discuss further workup at next visit  - nystatin (MYCOSTATIN) [id number] UNIT/ML suspension; Take 5 mLs (500,000 Units) by mouth 4 times daily for 7 days. Swish and swallow.    Gastroesophageal reflux disease, unspecified whether esophagitis present  Preliminary diagnosis based on early satiety, occasional reflux, halitosis.  Counseled to not start until submitted H. pylori sample  - omeprazole (PRILOSEC) 20 MG DR capsule; Take 1 capsule (20 mg) by mouth daily.    Halitosis  Will also see dental soon    Benign paroxysmal positional vertigo   Very brief episodes of isolated vertigo that are precipitated by eye movements.  Neurological exam not indicative of either a peripheral or central lesion - normal vestibular ocular reflex normal head impulse test no nystagmus and normal test of skew.  Remainder of the neurological exam was also normal  - Follow-up if bothersome, would recommend vestibular PT or consider medications (likes to avoid medications when possible)              No follow-ups on file.    Subjective   Vikash is a 40 year old, presenting for the following health issues:  Gastric Problem and Recheck Medication      4/10/2025    11:28 AM   Additional Questions   Roomed by Javier   Accompanied by Self         4/10/2025    Information    services provided? No     HPI      Off lexapro for 10 days. Bowel movements still not back to normal    2.   Is having dizziness (room spinning) getting a little better. Associated with some chest pressure. Worse when looking to the right    3.   Bad breath?  Partner has noted some  "unpleasant breath. and that after kissing, she has a burning sensation in her mouth. \"My mouth has a film and the back of my throat hurts\". He has a tingly tongue sensation for awhile now.     4.  Early satiety since childhood.  Eats a very small amount of food or simply a glass of water and feels uncomfortably full.  Burping a lot.  Lots of bloating.  Not necessarily epigastric pain.  Has to eat multiple small meals throughout the day    4/3/25:  \"tapering off of Lexapro the past couple of weeks. At my last check-in it was suggested I switch to 5mg a day for 7-10 days and then stop altogether. I did the following  - 5mg for 9 days  - one day with no Lexapro  - one day of 5mg    - 2 days with no Lexapro     I've been feeling more anxious, but nothing concerning and I'm prepared with other coping strategies regarding anxiety. However, I do feel lightheaded and dizzy.  Should I alternate days for another week (5mg, then none, etc)? Or am I ok to just stay off of it as long as the dizziness doesn't worsen or become a safety issue. It's mild to moderate currently.\"        11/14/2024     8:21 AM 12/20/2024     8:45 AM 4/10/2025    11:17 AM   PHQ   PHQ-9 Total Score 4  3  4    Q9: Thoughts of better off dead/self-harm past 2 weeks Not at all Not at all Not at all       Patient-reported         11/14/2024     8:22 AM 12/20/2024     8:46 AM 4/10/2025    11:18 AM   NICA-7 SCORE   Total Score 2 (minimal anxiety) 1 (minimal anxiety) 1 (minimal anxiety)   Total Score 2  1  1        Patient-reported    Proxy-reported                       Objective    /67 (BP Location: Left arm, Patient Position: Sitting, Cuff Size: Adult Regular)   Pulse 68   Temp 97.9  F (36.6  C) (Temporal)   Resp 16   Ht 1.702 m (5' 7\")   SpO2 99%   BMI 19.17 kg/m    Body mass index is 19.17 kg/m .  Physical Exam  HENT:      Mouth/Throat:      Comments: White plaques on bottom of tongue and cheeks which are easily removed  Eyes:      General: No " visual field deficit.  Neurological:      Mental Status: He is alert and oriented to person, place, and time.      Cranial Nerves: Cranial nerves 2-12 are intact. No dysarthria or facial asymmetry.      Sensory: No sensory deficit.      Motor: Motor function is intact. No weakness, tremor, atrophy, abnormal muscle tone or pronator drift.      Coordination: Coordination is intact. Romberg sign negative. Coordination normal. Finger-Nose-Finger Test and Heel to Shin Test normal. Rapid alternating movements normal.      Gait: Gait is intact.      Deep Tendon Reflexes:      Reflex Scores:       Patellar reflexes are 2+ on the right side and 2+ on the left side.     Comments: Normal vestibular ocular reflex  Normal head impulse test  No nystagmus  Normal test of skew                      Signed Electronically by: Uma Walton MD    Answers submitted by the patient for this visit:  Patient Health Questionnaire (Submitted on 4/10/2025)  If you checked off any problems, how difficult have these problems made it for you to do your work, take care of things at home, or get along with other people?: Somewhat difficult  PHQ9 TOTAL SCORE: 4  Patient Health Questionnaire (G7) (Submitted on 4/10/2025)  NICA 7 TOTAL SCORE: 1

## 2025-04-10 NOTE — PATIENT INSTRUCTIONS
Patient Education   Here is the plan from today's visit    1. Halitosis (Primary)      2. Callus of foot            Please call or return to clinic if your symptoms don't go away.    Follow up plan  No follow-ups on file.    Thank you for coming to [facility] today.  Lab Testing:  **If you had lab testing today and your results are reassuring or normal they will be mailed to you or sent through Resolver within 7 days.   **If the lab tests need quick action we will call you with the results.  **If you are having labs done on a different day, please call [phone] to schedule at [facility] or [phone] for other [facility] Outpatient Lab locations. Labs do not offer walk-in appointments.  The phone number we will call with results is # [phone] (home) . If this is not the best number please call our clinic and change the number.  Medication Refills:  If you need any refills please call your pharmacy and they will contact us.   If you need to  your refill at a new pharmacy, please contact the new pharmacy directly. The new pharmacy will help you get your medications transferred faster.   Scheduling:  If you have any concerns about today's visit or wish to schedule another appointment please call our office during normal business hours [phone] (8-5:00 M-F). If you can no longer make a scheduled visit, please cancel via Resolver or call us to cancel.   If a referral was made to an [facility] specialty provider and you do not get a call from central scheduling, please refer to directions on your visit summary or call our office during normal business hours for assistance.   If a Mammogram was ordered for you at the [facility] call [phone] to schedule or change your appointment.  If you had an XRay/CT/Ultrasound/MRI ordered the number is [phone] to schedule or change your radiology appointment.   [facility] has limited ultrasound appointments available on Wednesdays,  if you would like your ultrasound at [facility], please call [phone] to schedule.   Medical Concerns:  If you have urgent medical concerns please call [phone] at any time of the day.    Elena Storm MD

## 2025-04-11 PROCEDURE — 87338 HPYLORI STOOL AG IA: CPT

## 2025-04-14 LAB — H PYLORI AG STL QL IA: NEGATIVE

## 2025-04-29 ENCOUNTER — OFFICE VISIT (OUTPATIENT)
Dept: FAMILY MEDICINE | Facility: CLINIC | Age: 40
End: 2025-04-29
Payer: COMMERCIAL

## 2025-04-29 VITALS
HEART RATE: 74 BPM | RESPIRATION RATE: 16 BRPM | DIASTOLIC BLOOD PRESSURE: 69 MMHG | TEMPERATURE: 97.9 F | OXYGEN SATURATION: 99 % | SYSTOLIC BLOOD PRESSURE: 104 MMHG | BODY MASS INDEX: 19.09 KG/M2 | HEIGHT: 67 IN | WEIGHT: 121.6 LBS

## 2025-04-29 DIAGNOSIS — Z78.9 VEGETARIAN DIET: ICD-10-CM

## 2025-04-29 DIAGNOSIS — Z13.6 SCREENING FOR CARDIOVASCULAR CONDITION: ICD-10-CM

## 2025-04-29 DIAGNOSIS — E87.8 HYPOCHLOREMIA: ICD-10-CM

## 2025-04-29 DIAGNOSIS — F32.5 MAJOR DEPRESSIVE DISORDER, SINGLE EPISODE IN FULL REMISSION: ICD-10-CM

## 2025-04-29 DIAGNOSIS — E87.1 HYPONATREMIA: Primary | ICD-10-CM

## 2025-04-29 PROBLEM — F33.1 MODERATE EPISODE OF RECURRENT MAJOR DEPRESSIVE DISORDER (H): Status: RESOLVED | Noted: 2024-07-15 | Resolved: 2025-04-29

## 2025-04-29 LAB
ALBUMIN SERPL BCG-MCNC: 4.8 G/DL (ref 3.5–5.2)
ALP SERPL-CCNC: 60 U/L (ref 40–150)
ALT SERPL W P-5'-P-CCNC: 27 U/L (ref 0–70)
ANION GAP SERPL CALCULATED.3IONS-SCNC: 11 MMOL/L (ref 7–15)
AST SERPL W P-5'-P-CCNC: 24 U/L (ref 0–45)
BILIRUB SERPL-MCNC: 0.3 MG/DL
BUN SERPL-MCNC: 12.6 MG/DL (ref 6–20)
CALCIUM SERPL-MCNC: 9.8 MG/DL (ref 8.8–10.4)
CHLORIDE SERPL-SCNC: 98 MMOL/L (ref 98–107)
CREAT SERPL-MCNC: 0.79 MG/DL (ref 0.67–1.17)
EGFRCR SERPLBLD CKD-EPI 2021: >90 ML/MIN/1.73M2
ERYTHROCYTE [DISTWIDTH] IN BLOOD BY AUTOMATED COUNT: 12.5 % (ref 10–15)
GLUCOSE SERPL-MCNC: 101 MG/DL (ref 70–99)
HCO3 SERPL-SCNC: 27 MMOL/L (ref 22–29)
HCT VFR BLD AUTO: 40.4 % (ref 40–53)
HGB BLD-MCNC: 13.7 G/DL (ref 13.3–17.7)
MCH RBC QN AUTO: 31.3 PG (ref 26.5–33)
MCHC RBC AUTO-ENTMCNC: 33.9 G/DL (ref 31.5–36.5)
MCV RBC AUTO: 92 FL (ref 78–100)
PLATELET # BLD AUTO: 236 10E3/UL (ref 150–450)
POTASSIUM SERPL-SCNC: 5.5 MMOL/L (ref 3.4–5.3)
PROT SERPL-MCNC: 7.2 G/DL (ref 6.4–8.3)
RBC # BLD AUTO: 4.38 10E6/UL (ref 4.4–5.9)
SODIUM SERPL-SCNC: 136 MMOL/L (ref 135–145)
WBC # BLD AUTO: 4.5 10E3/UL (ref 4–11)

## 2025-04-29 ASSESSMENT — ANXIETY QUESTIONNAIRES
GAD7 TOTAL SCORE: 0
4. TROUBLE RELAXING: NOT AT ALL
7. FEELING AFRAID AS IF SOMETHING AWFUL MIGHT HAPPEN: NOT AT ALL
7. FEELING AFRAID AS IF SOMETHING AWFUL MIGHT HAPPEN: NOT AT ALL
GAD7 TOTAL SCORE: 0
1. FEELING NERVOUS, ANXIOUS, OR ON EDGE: NOT AT ALL
8. IF YOU CHECKED OFF ANY PROBLEMS, HOW DIFFICULT HAVE THESE MADE IT FOR YOU TO DO YOUR WORK, TAKE CARE OF THINGS AT HOME, OR GET ALONG WITH OTHER PEOPLE?: NOT DIFFICULT AT ALL
5. BEING SO RESTLESS THAT IT IS HARD TO SIT STILL: NOT AT ALL
GAD7 TOTAL SCORE: 0
6. BECOMING EASILY ANNOYED OR IRRITABLE: NOT AT ALL
2. NOT BEING ABLE TO STOP OR CONTROL WORRYING: NOT AT ALL
IF YOU CHECKED OFF ANY PROBLEMS ON THIS QUESTIONNAIRE, HOW DIFFICULT HAVE THESE PROBLEMS MADE IT FOR YOU TO DO YOUR WORK, TAKE CARE OF THINGS AT HOME, OR GET ALONG WITH OTHER PEOPLE: NOT DIFFICULT AT ALL
3. WORRYING TOO MUCH ABOUT DIFFERENT THINGS: NOT AT ALL

## 2025-04-29 ASSESSMENT — PATIENT HEALTH QUESTIONNAIRE - PHQ9
SUM OF ALL RESPONSES TO PHQ QUESTIONS 1-9: 1
10. IF YOU CHECKED OFF ANY PROBLEMS, HOW DIFFICULT HAVE THESE PROBLEMS MADE IT FOR YOU TO DO YOUR WORK, TAKE CARE OF THINGS AT HOME, OR GET ALONG WITH OTHER PEOPLE: NOT DIFFICULT AT ALL
SUM OF ALL RESPONSES TO PHQ QUESTIONS 1-9: 1

## 2025-04-29 ASSESSMENT — PAIN SCALES - GENERAL: PAINLEVEL_OUTOF10: NO PAIN (0)

## 2025-04-29 NOTE — PROGRESS NOTES
Preceptor Attestation:    I discussed the patient with the resident and evaluated the patient in person. I have verified the content of the note, which accurately reflects my assessment of the patient and the plan of care.   Supervising Physician:  Irene Mcgill MD.

## 2025-04-29 NOTE — PROGRESS NOTES
"   Assessment & Plan     Hyponatremia  He had low sodium on prior testing multiple times last year, most recent CMP was in August 2024. He had hypochloremia as well during that time. He is making multiple diet and supplement changes.  Uncertain etiology, recheck today. Asymptomatic.   - Comprehensive metabolic panel    Hypochloremia  Hypochloremia on 8/7/24 CMP re-check for resolution.  - Comprehensive metabolic panel    Vegetarian diet  Previously strict vegan diet, now mostly vegetarian with occasional meat and infrequent dairy. He expresses interest in getting vitamin B tests, and we discussed that the clinical significance of these would be most dependent on his blood count. He has been borderline to low for RBCs and hematocrit on past occasions, additionally he is making various diet changes and is supplementing iron. Recheck today. Asymptomatic.   - CBC with Platelets and Reflex to Iron Studies        Rhonda Suh is a 40 year old, presenting for the following health issues:  RECHECK (Would like a nutrient blood work, )        4/29/2025     9:12 AM   Additional Questions   Roomed by Ken   Accompanied by self         4/29/2025    Information    services provided? No     HPI    Vikash Murillo is a 40 year old male, otherwise healthy, who presents for follow up of prior oral thrush and early satiety. He states that since he was seen for oral thrush two weeks ago, he has not yet began use of nystatin, he states he has started with diet changes known as \"candida diet\" including fewer carbs and dairy, more greens, and more protein. He state he has also used a bit of coconut oil or oregano oil, not consistently, and has been increasing scraping and oral hygiene. He states with these changes has not had any more white patches, but still feels some tingling / paresthesias and lingering halitosis. He states he is delaying nystatin due to possible medication side effects though isn't opposed " "altogether and in fact plans to take it later on.     He states he is also interested in getting follow up labs especially due to low sodium and chloride about a year ago, and as well as labs for monitoring calcium, and vitamins as relevant for his recent dietary changes. He states that his early satiety discussed in the previous visit is about the same as prior though he is more comfortable with the idea of this just being a fact of his personal health rather than something medically wrong.     He states he has considered taking Black Diamond's wort for depression in the future but has not done so and is aware of possible medication effects.    He mentions allergy testing is something he would be interested in for a future visit. States he gets persistent post-nasal drip during the winter, without itching, or sinus congestion.    He denies having any brain fog, mouth pain, sore throat, nausea, vomiting, stool changes, or any additional symptoms he'd like to discuss.      Medications / Supplements  Patient reports taking:  Meds: None    Supplements:  - Vitamin D / K2  - Iron  - Vitamin C   - Zinc  - lemon balm   - Propolis                     Objective    /69   Pulse 74   Temp 97.9  F (36.6  C) (Oral)   Resp 16   Ht 1.702 m (5' 7\")   Wt 55.2 kg (121 lb 9.6 oz)   SpO2 99%   BMI 19.05 kg/m    Body mass index is 19.05 kg/m .    Wt Readings from Last 4 Encounters:   04/29/25 55.2 kg (121 lb 9.6 oz)   03/31/25 55.5 kg (122 lb 6.4 oz)   12/20/24 56.9 kg (125 lb 8 oz)   10/29/24 55.2 kg (121 lb 12.8 oz)      Physical Exam   GENERAL: alert and comfortable appearing adult .  Head: Normal, Atraumatic. No rash or lesions around lips.   Eyes: Wearing eyeglasses. Pupils equal and round.  NECK: no visible asymmetry, or masses  MS: No gross musculoskeletal defects noted.  SKIN: No suspicious lesions or rashes.   NEURO: Normal strength and tone, mentation intact and speech normal.  Psych: Mood: \"well\". Affect: pleasant. " Good eye contact.           Signed Electronically by: Uma Walton MD    Answers submitted by the patient for this visit:  Patient Health Questionnaire (Submitted on 4/29/2025)  If you checked off any problems, how difficult have these problems made it for you to do your work, take care of things at home, or get along with other people?: Not difficult at all  PHQ9 TOTAL SCORE: 1  Patient Health Questionnaire (G7) (Submitted on 4/29/2025)  NICA 7 TOTAL SCORE: 0

## 2025-04-30 DIAGNOSIS — R79.89 ABNORMAL CBC: Primary | ICD-10-CM

## 2025-05-01 ENCOUNTER — MYC MEDICAL ADVICE (OUTPATIENT)
Dept: FAMILY MEDICINE | Facility: CLINIC | Age: 40
End: 2025-05-01
Payer: COMMERCIAL

## 2025-05-01 DIAGNOSIS — Z13.6 SCREENING FOR CARDIOVASCULAR CONDITION: Primary | ICD-10-CM

## 2025-05-01 LAB
CHOLEST SERPL-MCNC: 217 MG/DL
HDLC SERPL-MCNC: 47 MG/DL
LDLC SERPL CALC-MCNC: 137 MG/DL
NONHDLC SERPL-MCNC: 170 MG/DL
TRIGL SERPL-MCNC: 165 MG/DL

## 2025-05-13 ENCOUNTER — OFFICE VISIT (OUTPATIENT)
Dept: FAMILY MEDICINE | Facility: CLINIC | Age: 40
End: 2025-05-13
Payer: COMMERCIAL

## 2025-05-13 VITALS
SYSTOLIC BLOOD PRESSURE: 105 MMHG | DIASTOLIC BLOOD PRESSURE: 70 MMHG | RESPIRATION RATE: 16 BRPM | OXYGEN SATURATION: 99 % | TEMPERATURE: 97.8 F | WEIGHT: 119.9 LBS | HEART RATE: 84 BPM | BODY MASS INDEX: 18.82 KG/M2 | HEIGHT: 67 IN

## 2025-05-13 DIAGNOSIS — F41.1 GENERALIZED ANXIETY DISORDER: ICD-10-CM

## 2025-05-13 DIAGNOSIS — R68.81 EARLY SATIETY: ICD-10-CM

## 2025-05-13 DIAGNOSIS — L81.9 HYPERPIGMENTATION: ICD-10-CM

## 2025-05-13 DIAGNOSIS — F32.5 MAJOR DEPRESSIVE DISORDER, SINGLE EPISODE IN FULL REMISSION: ICD-10-CM

## 2025-05-13 DIAGNOSIS — K21.9 GASTROESOPHAGEAL REFLUX DISEASE, UNSPECIFIED WHETHER ESOPHAGITIS PRESENT: ICD-10-CM

## 2025-05-13 DIAGNOSIS — B37.0 ORAL THRUSH: Primary | ICD-10-CM

## 2025-05-13 PROCEDURE — 99214 OFFICE O/P EST MOD 30 MIN: CPT | Mod: GC

## 2025-05-13 PROCEDURE — 1126F AMNT PAIN NOTED NONE PRSNT: CPT

## 2025-05-13 PROCEDURE — 3074F SYST BP LT 130 MM HG: CPT

## 2025-05-13 PROCEDURE — 3078F DIAST BP <80 MM HG: CPT

## 2025-05-13 RX ORDER — CLOTRIMAZOLE 10 MG/1
10 LOZENGE ORAL
Qty: 70 LOZENGE | Refills: 0 | Status: SHIPPED | OUTPATIENT
Start: 2025-05-13 | End: 2025-05-27

## 2025-05-13 ASSESSMENT — ANXIETY QUESTIONNAIRES
7. FEELING AFRAID AS IF SOMETHING AWFUL MIGHT HAPPEN: NOT AT ALL
8. IF YOU CHECKED OFF ANY PROBLEMS, HOW DIFFICULT HAVE THESE MADE IT FOR YOU TO DO YOUR WORK, TAKE CARE OF THINGS AT HOME, OR GET ALONG WITH OTHER PEOPLE?: SOMEWHAT DIFFICULT
1. FEELING NERVOUS, ANXIOUS, OR ON EDGE: SEVERAL DAYS
GAD7 TOTAL SCORE: 4
3. WORRYING TOO MUCH ABOUT DIFFERENT THINGS: SEVERAL DAYS
5. BEING SO RESTLESS THAT IT IS HARD TO SIT STILL: NOT AT ALL
GAD7 TOTAL SCORE: 4
IF YOU CHECKED OFF ANY PROBLEMS ON THIS QUESTIONNAIRE, HOW DIFFICULT HAVE THESE PROBLEMS MADE IT FOR YOU TO DO YOUR WORK, TAKE CARE OF THINGS AT HOME, OR GET ALONG WITH OTHER PEOPLE: SOMEWHAT DIFFICULT
4. TROUBLE RELAXING: SEVERAL DAYS
2. NOT BEING ABLE TO STOP OR CONTROL WORRYING: SEVERAL DAYS
7. FEELING AFRAID AS IF SOMETHING AWFUL MIGHT HAPPEN: NOT AT ALL
GAD7 TOTAL SCORE: 4
6. BECOMING EASILY ANNOYED OR IRRITABLE: NOT AT ALL

## 2025-05-13 ASSESSMENT — PATIENT HEALTH QUESTIONNAIRE - PHQ9
SUM OF ALL RESPONSES TO PHQ QUESTIONS 1-9: 1
SUM OF ALL RESPONSES TO PHQ QUESTIONS 1-9: 1
10. IF YOU CHECKED OFF ANY PROBLEMS, HOW DIFFICULT HAVE THESE PROBLEMS MADE IT FOR YOU TO DO YOUR WORK, TAKE CARE OF THINGS AT HOME, OR GET ALONG WITH OTHER PEOPLE: SOMEWHAT DIFFICULT

## 2025-05-13 ASSESSMENT — PAIN SCALES - GENERAL: PAINLEVEL_OUTOF10: NO PAIN (0)

## 2025-05-13 NOTE — PROGRESS NOTES
Assessment & Plan     Oral thrush  Patient diagnosed with oral thrush clinically about 1 month ago and completed 7 days of QID nystatin swish and swallow. Potentially some improvement in symptoms but continues to notice diffuse tingling of his tongue and some residual white spots. On exam there are some white patches on the posterior aspect of the tongue that can be removed with a tongue depressor. No lesions noted on buccal mucosal or underneath tongue. Due to persistent symptoms, will switch to a different topical antifungal clotrimazole.   - clotrimazole (MYCELEX) 10 MG lozenge; Place 1 lozenge (10 mg) inside cheek 5 times daily for 14 days.    Early satiety  Gastroesophageal reflux disease, unspecified whether esophagitis present  Continues to have early satiety. No red flags to warrant endoscopy. Negative H. Pylori testing at the end of April. Confusion surrounding indication for omeprazole so he picked up the medication but did not start taking it. Provided education about indication of medication, potential side effects, and expected duration of up to 6 months if symptoms are improving. Will try medication for 1-2 months and follow up after to discuss changes.     Hyperpigmentation of cuticle  Recently noticed hyperpigmentation on cuticle of right index finger that he is concerned is fungal infection. On exam there are a few small spots of hyperpigmentation that are red. Most likely cause is healing trauma to nailbed. Unlikely to be fungal infection or melanoma. Will continue to monitor for now.     Major depressive disorder, single episode in full remission  Previously needed SSRIs for episode of major depression. Not currently experiencing an episode or needing medications but interested in different option if this recurs in the future as he did not tolerate fluoxetine or escitalopram well in the past. Discussed Zoe Majeste testing for more information about potential options since two medications were  ineffective / not tolerable. Will place referral.   - Genetics counseling      Subjective   Vikash is a 40 year old, presenting for the following health issues:  Mouth/Lip Problem, Nail Problem, and Recheck Medication        5/13/2025     8:31 AM   Additional Questions   Roomed by Sowmya   Accompanied by Self         5/13/2025    Information    services provided? No     HPI      Mouth/lip problem - oral thrust  - unclear if it is improving   - used nystatin for 1 wk  - still have white tongue, pain  - unsure if halitosis   - has been doing candida diet (no sugar or carbs, no tolerating it well, feels like its causing more bloating, will add in rice next week), also using herbal antifungal    - wondering about next step    - at home baking soda test, didn't belch after 5 minutes, low hydrochloric acid, typically burps consistently throughout the day     - had start of dentist appointment with xray and initial dentist eval, no cavities so likely not cause of bad breath     Nail problem:  - right middle finger  - brown discoloration of cuticle  - scraped away a few days ago and it came back  - unsure how long it has been there but just noticed it recently  - nothing on any other fingers or toes  - hasn't tried any medication or creams yet     GI issues:  - continues to have early satiety   - sometimes feels full after 6 bites  - able to eat two eggs and steamed veggies, spoonful of almond butter then felt like too much  - wants to be able to eat more than he is   - downloaded hypnosis diana for IBS   - no weight loss  - no fhx gastric cancer  - no rectal bleeding  - no vomiting     Labs:  - discussed fasting versus not fasting labs    Omeprazole   - didn't start it because of confusion with h pylori   - unsure if having too much or too little acid       Potential alternate selective serotonin reuptake inhibitor discussion in the future   - not eager to try lexapro or prozac   - curious about future  "options   - not feeling like he needs one right now       Answers submitted by the patient for this visit:  Patient Health Questionnaire (Submitted on 5/13/2025)  If you checked off any problems, how difficult have these problems made it for you to do your work, take care of things at home, or get along with other people?: Somewhat difficult  PHQ9 TOTAL SCORE: 1  Patient Health Questionnaire (G7) (Submitted on 5/13/2025)  NICA 7 TOTAL SCORE: 4          Objective    /70 (BP Location: Left arm, Patient Position: Sitting, Cuff Size: Adult Regular)   Pulse 84   Temp 97.8  F (36.6  C) (Temporal)   Resp 16   Ht 1.702 m (5' 7\")   Wt 54.4 kg (119 lb 14.4 oz)   SpO2 99%   BMI 18.78 kg/m    Body mass index is 18.78 kg/m .  Physical Exam  Constitutional:       Appearance: Normal appearance.   HENT:      Head: Normocephalic.      Mouth/Throat:      Lips: Pink. No lesions.      Mouth: Mucous membranes are moist. No oral lesions.      Tongue: Lesions present.      Palate: No lesions.      Pharynx: Uvula midline.      Comments: Small white patch in posterior midline area of tongue that can be removed with gentle scraping.   Eyes:      Conjunctiva/sclera: Conjunctivae normal.   Pulmonary:      Effort: Pulmonary effort is normal.   Skin:     Comments: Hyperpigmentation on cuticle of right index finger.   Neurological:      Mental Status: He is alert.   Psychiatric:         Mood and Affect: Mood normal.         Behavior: Behavior normal.          Shilpa Yang, MS4  [employer]  May 13, 2025 8:33 AM     Resident/Fellow Attestation   I, Uma Walton MD, was present with the medical/[employer]AS[employer] student who participated in the service and in the documentation of the note.  I have verified the history and personally performed the physical exam and medical decision making.  I agree with the assessment and plan of care as documented in the note.      Uma Walton MD  PGY3      "

## 2025-06-23 ENCOUNTER — OFFICE VISIT (OUTPATIENT)
Dept: FAMILY MEDICINE | Facility: CLINIC | Age: 40
End: 2025-06-23
Payer: COMMERCIAL

## 2025-06-23 VITALS
DIASTOLIC BLOOD PRESSURE: 63 MMHG | WEIGHT: 122.5 LBS | HEART RATE: 63 BPM | OXYGEN SATURATION: 99 % | BODY MASS INDEX: 19.23 KG/M2 | HEIGHT: 67 IN | TEMPERATURE: 97.9 F | RESPIRATION RATE: 14 BRPM | SYSTOLIC BLOOD PRESSURE: 99 MMHG

## 2025-06-23 DIAGNOSIS — B37.0 ORAL THRUSH: Primary | ICD-10-CM

## 2025-06-23 PROCEDURE — 99213 OFFICE O/P EST LOW 20 MIN: CPT | Mod: GC

## 2025-06-23 PROCEDURE — 3074F SYST BP LT 130 MM HG: CPT

## 2025-06-23 PROCEDURE — 3078F DIAST BP <80 MM HG: CPT

## 2025-06-23 RX ORDER — FLUCONAZOLE 100 MG/1
TABLET ORAL
Qty: 15 TABLET | Refills: 0 | Status: SHIPPED | OUTPATIENT
Start: 2025-06-23

## 2025-06-23 ASSESSMENT — PATIENT HEALTH QUESTIONNAIRE - PHQ9
SUM OF ALL RESPONSES TO PHQ QUESTIONS 1-9: 5
SUM OF ALL RESPONSES TO PHQ QUESTIONS 1-9: 5
10. IF YOU CHECKED OFF ANY PROBLEMS, HOW DIFFICULT HAVE THESE PROBLEMS MADE IT FOR YOU TO DO YOUR WORK, TAKE CARE OF THINGS AT HOME, OR GET ALONG WITH OTHER PEOPLE: SOMEWHAT DIFFICULT

## 2025-06-23 NOTE — PROGRESS NOTES
"  Assessment & Plan     Oral thrush  Patient continues to have mild oral thrush on his anterior tongue that is contributing to halitosis and impacting his day to day life. Patient noticed some resolution after the course of clotrimazole, making the diagnosis of oral thrust more likely. With his persistence of symptoms, we will start fluconazole.  - fluconazole (DIFLUCAN) 100 MG tablet; Take 2 tablets (200mg) the first day, then 1 tablet (100mg) daily for 14 days  - follow up for recheck in 2 weeks        Subjective   Vikash is a 40 year old, presenting for the following health issues:  No chief complaint on file.    Thrush   - Completed clotrimazole course   - A day or two after stopping it, it looked better but then got worse   - Has been continuing to limit carbs and sugars   - Hallotosis   - Becoming frustrating : interfering with dating life and feeling worried that this is never going to go away   - Herbal antifungals, raw garlic and cocount oil   - \"Worse than ever\"     Digestive issues   - Having diarrhea everyday, 2 weeks   - thinks it may be anxiety related, has felt more anxious about the world and personal stuff   - first time this is consistent but has had similar issues in the past '  - Started around the same time as stopping the clortrimazole                         Objective    There were no vitals taken for this visit.  There is no height or weight on file to calculate BMI.  Physical Exam  Constitutional:       Appearance: Normal appearance.   HENT:      Head: Normocephalic and atraumatic.      Mouth/Throat:      Mouth: Mucous membranes are moist.      Comments: Small white plaques on the anterior tongue that is able to be scraped off   Neurological:      General: No focal deficit present.      Mental Status: He is alert and oriented to person, place, and time.   Psychiatric:         Mood and Affect: Mood normal.         Behavior: Behavior normal.                    Myron Villarreal, MS3  [employer]     Resident/Fellow Attestation   I, Uma Walton MD, was present with the medical/[employer] student who participated in the service and in the documentation of the note.  I have verified the history and personally performed the physical exam and medical decision making.  I agree with the assessment and plan of care as documented in the note.      Uma Walton MD  PGY3      Answers submitted by the patient for this visit:  Patient Health Questionnaire (Submitted on 6/23/2025)  If you checked off any problems, how difficult have these problems made it for you to do your work, take care of things at home, or get along with other people?: Somewhat difficult  PHQ9 TOTAL SCORE: 5

## 2025-07-08 ENCOUNTER — TELEPHONE (OUTPATIENT)
Dept: FAMILY MEDICINE | Facility: CLINIC | Age: 40
End: 2025-07-08
Payer: COMMERCIAL

## 2025-07-08 NOTE — TELEPHONE ENCOUNTER
[facility] phone call message- general phone call:    Reason for call: fluconazole (DIFLUCAN) 100 MG tablet  this medication was somewhat effective for thrush sympotoms, but they are not entirely gone. Can this medication be refilled?    Pharmacy is [facility].    Return call needed: Yes    OK to leave a message on voice mail? Yes    Primary language: English      needed? No    Call taken on July 8, 2025 at 9:19 AM by Marichuy Jones

## 2025-07-11 ENCOUNTER — OFFICE VISIT (OUTPATIENT)
Dept: FAMILY MEDICINE | Facility: CLINIC | Age: 40
End: 2025-07-11
Payer: COMMERCIAL

## 2025-07-11 VITALS
WEIGHT: 122 LBS | OXYGEN SATURATION: 97 % | TEMPERATURE: 98.1 F | HEIGHT: 67 IN | BODY MASS INDEX: 19.15 KG/M2 | DIASTOLIC BLOOD PRESSURE: 63 MMHG | SYSTOLIC BLOOD PRESSURE: 97 MMHG | HEART RATE: 70 BPM | RESPIRATION RATE: 16 BRPM

## 2025-07-11 DIAGNOSIS — B37.0 ORAL THRUSH: ICD-10-CM

## 2025-07-11 DIAGNOSIS — R68.81 EARLY SATIETY: Primary | ICD-10-CM

## 2025-07-11 DIAGNOSIS — K21.9 GASTROESOPHAGEAL REFLUX DISEASE, UNSPECIFIED WHETHER ESOPHAGITIS PRESENT: ICD-10-CM

## 2025-07-11 LAB
ALBUMIN SERPL BCG-MCNC: 4.5 G/DL (ref 3.5–5.2)
ALP SERPL-CCNC: 52 U/L (ref 40–150)
ALT SERPL W P-5'-P-CCNC: 30 U/L (ref 0–70)
ANION GAP SERPL CALCULATED.3IONS-SCNC: 9 MMOL/L (ref 7–15)
AST SERPL W P-5'-P-CCNC: 28 U/L (ref 0–45)
BILIRUB SERPL-MCNC: 0.4 MG/DL
BUN SERPL-MCNC: 15.1 MG/DL (ref 6–20)
CALCIUM SERPL-MCNC: 9.8 MG/DL (ref 8.8–10.4)
CHLORIDE SERPL-SCNC: 101 MMOL/L (ref 98–107)
CREAT SERPL-MCNC: 0.78 MG/DL (ref 0.67–1.17)
EGFRCR SERPLBLD CKD-EPI 2021: >90 ML/MIN/1.73M2
GLUCOSE SERPL-MCNC: 100 MG/DL (ref 70–99)
HCO3 SERPL-SCNC: 29 MMOL/L (ref 22–29)
POTASSIUM SERPL-SCNC: 4.3 MMOL/L (ref 3.4–5.3)
PROT SERPL-MCNC: 6.9 G/DL (ref 6.4–8.3)
SODIUM SERPL-SCNC: 139 MMOL/L (ref 135–145)

## 2025-07-11 PROCEDURE — 80053 COMPREHEN METABOLIC PANEL: CPT

## 2025-07-11 PROCEDURE — 36415 COLL VENOUS BLD VENIPUNCTURE: CPT

## 2025-07-11 RX ORDER — FLUCONAZOLE 100 MG/1
TABLET ORAL
Qty: 15 TABLET | Refills: 0 | Status: CANCELLED | OUTPATIENT
Start: 2025-07-11

## 2025-07-11 RX ORDER — FLUCONAZOLE 100 MG/1
200 TABLET ORAL DAILY
Qty: 28 TABLET | Refills: 0 | Status: SHIPPED | OUTPATIENT
Start: 2025-07-11

## 2025-07-11 NOTE — PROGRESS NOTES
Assessment & Plan     Oral thrush  Patient reports long history of treatment resistant oral thrush. Seen first in clinic for bad breath, white patches on his tongue, and tingling sensation on 3/31/2025 and diagnosed with halitosis. On 4/10/2025 characteristic lesions were noted on exam and he was prescribed nystatin rinse, which he took as prescribed without relief. On 5/13/2025 he continued to have white patches on his tongue and tingling and was switched to clotrimazole lozenges for 14 days. Had some relief with clotrimazole and with continued white patches on his tongue on 6/23/2025 was prescribed fluconazole 100mg daily for 14 days. The course of fluconazole seemed to help, but did not completely resolve the symptoms. Here today complaining of continued oral thrush symptoms. He has made significant changes to his diet (cutting out carbs and refined sugar) to align with the Candida Diet. On exam, I could not appreciate any white patches on his tongue, palate, or gums, but he did say he had aggressively scraped his tongue after eating lunch. It remains unclear why he has oral thrush to begin with. HIV testing in 2024 was negative and he has no exposures since. He last took antibiotics in November 2024. As he has now failed three courses of antifungal treatment and is immunocompetent as far as we know, referring to infectious disease for further workup. In the interim, I will step up the fluconazole to 200mg daily for 14 days as it seemed to help. Ordered labs to check electrolytes and kidney/ liver function after the first 14 day course of fluconazole. Discussed waiting until those labs have resulted before starting the new prescription.    - fluconazole (DIFLUCAN) 100 MG tablet  Dispense: 28 tablet; Refill: 0  - Comprehensive Metabolic Panel  - Adult Infectious Disease  Referral    Early satiety  Gastroesophageal reflux disease, unspecified whether esophagitis present  Patient reports a long history of  early satiety and bloating. Since he was a child, he has found that he can't eat as much as he wants to without being uncomfortably full and distended. When he eats too much he feels discomfort for 8+ hours. This morning he ate yogurt for breakfast and felt uncomfortable drinking water 5 hours later. He has been worked up for this in the past and was negative for H. Pylori. Recent history of diarrhea which could be attributed to his changes in diet or increased levels of stress. No history of constipation. He has not seen GI for these issues and is amenable to being referred.   - Adult GI  Referral - Consult Only    Follow-up   Return in about 2 weeks (around 7/25/2025). You have an appointment on 7/23/2025 with your new PCP, Dr. Gonzáles. Please keep that appointment.     Subjective   Vikash is a 40 year old, presenting for the following health issues:  Clinic Care Coordination - Follow-up (Oral thrush medication follow up. Finished medication. Bloating )        7/11/2025     2:20 PM   Additional Questions   Roomed by tim   Accompanied by self         7/11/2025    Information    services provided? No     HPI       Oral Thrush   First started getting evaluated in February 2025. Probably started in late 2024.   Tried oral anti fungal rinses without relief  Finished 2 week course of fluconazole 100mg which seemed to help, but not completely resolve the symptoms   Tolerated fluconazole well  Symptoms include white rash on tongue, tingling, and halitosis   Limiting carbs and sugars   Also using herbal remedies  Coconut oil (1 tbsp per day)  Oil pulling   Eating raw garlic  Lemon balm and propolis tincture  Rinsing his mouth with essential oils (peppermint, lemon, eucalyptus, geranium, and tea tree) diluted in water  Oregano oil tablets  He has been using a tongue scraper and brushing his teeth after meals   Says the rash comes back approximately 4 hours later or after eating  Early Satiety  "and Bloating   Past 20 years  Better when he eats small meals and exercises regularly, which is difficult with his current diet (not eating carbs)  Feels full after only a small amount of food and if he continues eating becomes uncomfortably full for several hours  Does not eat refined sugar or salty snacks  When he does gain weight, it is only in his abdomen  Having diarrhea recently  Tried omeprazole for 2 days but felt awful so stopped; still not sure why he was supposed to be taking it  H. Pylori negative 4/29/2025  Interested in GI referral     {MA/LPN/RN Pre-Provider Visit Orders- hCG/UA/Strep (Optional):762908}  {SUPERLIST (Optional):924367}  {additonal problems for provider to add (Optional):711693}    {ROS Picklists (Optional):717962}      Objective    BP 97/63   Pulse 70   Temp 98.1  F (36.7  C) (Temporal)   Resp 16   Ht 1.702 m (5' 7\")   Wt 55.3 kg (122 lb)   SpO2 97%   BMI 19.11 kg/m    Body mass index is 19.11 kg/m .  Physical Exam  Vitals reviewed.   Constitutional:       General: He is not in acute distress.  HENT:      Mouth/Throat:      Lips: Pink.      Mouth: Mucous membranes are moist. No oral lesions.      Dentition: No gum lesions.      Tongue: No lesions.      Palate: No lesions.   Abdominal:      General: Abdomen is flat. Bowel sounds are normal. There is no distension.      Palpations: Abdomen is soft.      Tenderness: There is no abdominal tenderness. There is no guarding or rebound.   Neurological:      Mental Status: He is alert.        {Exam List (Optional):108381}    {Diagnostic Test Results (Optional):305268}        Cinthya Gleason MS3 ***    Signed Electronically by: Anh Oneill MD  {Email feedback regarding this note to [email or web address]   :875465}  " - 45 U/L    ALT 30 0 - 70 U/L    Protein Total 6.9 6.4 - 8.3 g/dL    Albumin 4.5 3.5 - 5.2 g/dL    Bilirubin Total 0.4 <=1.2 mg/dL           Cinthya Gleason, MS3   Resident/Fellow Attestation   I, Anh Oneill MD, was present with the medical/[employer] student who participated in the service and in the documentation of the note.  I have verified the history and personally performed the physical exam and medical decision making.  I agree with the assessment and plan of care as documented in the note.      Anh Oneill MD  PGY2      Signed Electronically by: Anh Oneill MD

## 2025-07-11 NOTE — PATIENT INSTRUCTIONS
Patient Education   Here is the plan from today's visit    1. Early satiety (Primary)  - Adult GI  Referral - Consult Only; Future    2. Gastroesophageal reflux disease, unspecified whether esophagitis present  - Adult GI  Referral - Consult Only; Future    3. Oral thrush  - fluconazole (DIFLUCAN) 100 MG tablet; Take 2 tablets (200 mg) by mouth daily.  Dispense: 28 tablet; Refill: 0  - Comprehensive Metabolic Panel; Future  - Adult Infectious Disease  Referral; Future          Please call or return to clinic if your symptoms don't go away.    Follow up plan  No follow-ups on file.    Thank you for coming to [facility] today.  Lab Testing:  **If you had lab testing today and your results are reassuring or normal they will be mailed to you or sent through [employer] within 7 days.   **If the lab tests need quick action we will call you with the results.  **If you are having labs done on a different day, please call [phone] to schedule at [facility] or [phone] for other [facility]patient [facility] locations. Labs do not offer walk-in appointments.  The phone number we will call with results is # [phone] (home) . If this is not the best number please call our clinic and change the number.  Medication Refills:  If you need any refills please call your pharmacy and they will contact us.   If you need to  your refill at a new pharmacy, please contact the new pharmacy directly. The new pharmacy will help you get your medications transferred faster.   Scheduling:  If you have any concerns about today's visit or wish to schedule another appointment please call our office during normal business hours [phone] (8-5:00 M-F). If you can no longer make a scheduled visit, please cancel via [employer] or call us to cancel.   If a referral was made to an [facility] specialty provider and you do not get a call from central scheduling, please refer to directions on your  visit summary or call our office during normal business hours for assistance.   If a Mammogram was ordered for you at the Breast Center call [phone] to schedule or change your appointment.  If you had an XRay/CT/Ultrasound/MRI ordered the number is [phone] to schedule or change your radiology appointment.   [facility] has limited ultrasound appointments available on Wednesdays, if you would like your ultrasound at [facility], please call [phone] to schedule.   Medical Concerns:  If you have urgent medical concerns please call [phone] at any time of the day.    Anh Oneill MD

## 2025-07-14 ENCOUNTER — PATIENT OUTREACH (OUTPATIENT)
Dept: CARE COORDINATION | Facility: CLINIC | Age: 40
End: 2025-07-14
Payer: COMMERCIAL

## 2025-07-16 ENCOUNTER — MYC MEDICAL ADVICE (OUTPATIENT)
Dept: FAMILY MEDICINE | Facility: CLINIC | Age: 40
End: 2025-07-16
Payer: COMMERCIAL

## 2025-07-16 DIAGNOSIS — R68.81 EARLY SATIETY: Primary | ICD-10-CM

## 2025-07-19 ENCOUNTER — HEALTH MAINTENANCE LETTER (OUTPATIENT)
Age: 40
End: 2025-07-19

## 2025-07-24 NOTE — CONFIDENTIAL NOTE
DIAGNOSIS:  Oral thrush    DATE RECEIVED:  08.11.2025   NOTES (Gather within 2 years) STATUS DETAILS   OFFICE NOTE from referring provider   Internal 07.11.2025  Shayna Glass MD    OFFICE NOTE from other specialist Internal 06.23.2025  Uma Walton MD   LABS (any labs) Internal    MEDICATION LIST Internal

## 2025-07-28 ENCOUNTER — VIRTUAL VISIT (OUTPATIENT)
Dept: CONSULT | Facility: CLINIC | Age: 40
End: 2025-07-28
Payer: COMMERCIAL

## 2025-07-28 DIAGNOSIS — Z71.83 ENCOUNTER FOR NONPROCREATIVE GENETIC COUNSELING AND TESTING: ICD-10-CM

## 2025-07-28 DIAGNOSIS — Z13.71 ENCOUNTER FOR NONPROCREATIVE GENETIC COUNSELING AND TESTING: ICD-10-CM

## 2025-07-28 DIAGNOSIS — Z78.9 LACK OF DRUG EFFECT (PROPERLY PRESCRIBED AND ADMINISTERED): ICD-10-CM

## 2025-07-28 DIAGNOSIS — T50.905D ADVERSE EFFECT OF DRUG, SUBSEQUENT ENCOUNTER: Primary | ICD-10-CM

## 2025-07-28 DIAGNOSIS — F41.1 GENERALIZED ANXIETY DISORDER: ICD-10-CM

## 2025-07-28 PROCEDURE — 96041 GENETIC COUNSELING SVC EA 30: CPT | Mod: GT,95

## 2025-07-28 NOTE — LETTER
"7/28/2025      RE: Vikash Murillo  [address]  [place] [address]     Dear Colleague,    Thank you for the opportunity to participate in the care of your patient, Vikash Murillo, at the [facility] at [facility]. Please see a copy of my visit note below.    Virtual Visit Details    Type of service:  Video Visit   Video Start Time: 1:02 PM  Video End Time:1:14PM  Originating Location (pt. Location): Home  Distant Location (provider location):  On-site  Platform used for Video Visit: [facility]    GENETIC COUNSELING CONSULTATION NOTE    Date of visit: Jul 28, 2025    Presenting Information:   Vikash Murillo is a 40 year old male referred to the [facility] at the request of Dr Uma Walton today. Vikash was seen for a genetic counseling appointment to discuss the details of pharmacogenomic testing and to coordinate this testing.     Vikash shared that he has depression and has previously tried multiple selective serotonin reuptake inhibitors without finding a good fit. Initially he tried prozac for 5 weeks without relief (in fact, symptoms worsened). Next he tried lexapro for 3-4 months which initially provided some symptom relief but seemed to \"wear off\" over time. Vikash is interested in this testing to guide next medication selection.    Current Medications:  Current Outpatient Medications   Medication Sig Dispense Refill     fluconazole (DIFLUCAN) 100 MG tablet Take 2 tablets (200 mg) by mouth daily. 28 tablet 0     No current facility-administered medications for this visit.      Family History: A three generation pedigree was obtained and scanned into the electronic medical record. The relevant portions are described below:    Children- None  Siblings- Two, no known notable history  Parents- Father passed at 59 years old due to lung cancer (lifelong smoker). He also had had his gallbladder removed. " Mother has osteoporosis.  Maternal Relatives- Grandparents   Paternal Relatives- Grandparents . Grandfather was a  in WW2 and passed at a young age from a skin condition/cancer? Family attributes his illness to his exposures as a .    Family history is otherwise largely non-contributory.     Genetic Counseling Discussion:  For review, our bodies are made of cells that contain our chromosomes which are made up of long stretches of DNA containing our genes. Our genes serve as the instructions for our bodies to grow and function. We have two copies of each gene, one inherited from our mother and one inherited from our father.     What pharmacogenomic testing can tell us:  There are several factors that can determine how an individual responds to medications. Some of these factors include environmental factors such as lifestyle choices (diet, alcohol and/or tobacco use) or other medications an individual might be taking, and other factors can include a person's age, sex, ethnic background, disease, and underlying genetic factors. There are several genes in our body that provide instructions for breaking down the medications we take. Changes in these genes (sometimes called variants or polymorphisms/SNPs) can alter how the body breaks down certain medications. For example, a change in a gene can slow down the process of medication breakdown leading to too much of that medication/drug in the body which can cause side effects. On the other hand, a change in a gene can speed up the breakdown of a medication so a therapeutic level is not reached and the medication may not work well at the standard doses. Therefore, identifying changes in these genes via pharmacogenomic testing can help guide which medications might work best for an individual, what dose of a medication may be best, or if a certain medication has a high risk for causing serious side effects.     The pharmacogenomic testing  offered at [facility] analyzes several different polymorphisms in different genes associated with drug metabolism. These variants have been determined to be medically actionable by practice guidelines including CPIC (Clinical Pharmacogenetics Implementation Consortium), PharmGKB and/or FDA gene-drug interaction guidelines. Therefore, prescribing recommendations can be made by the results of this test, so this testing for Vikash is DIAGNOSTIC and is NOT investigational.     The results of this test can provide guidance for several different types of drugs such as antiinflammatories, antithrombotics, lipid-lowering medication, acid-lowering medications, antiemetics, immunosuppressants, antivirals, antifungals, antidepressants, ADHD medications, antimetabolites, and/or hormone antagonists. This means that, while this testing was recommended for a specific reason right now (Vikash's mental health), it may provide guidance for future medication use.     As previously mentioned, we inherit our genes from our parents. This means that some of the pharmacogenomic variants found on this test may be shared by other relatives. These results could be helpful to share with other relatives, but it is important to remember that there are many factors that can contribute to how an individual responds to medications. Relatives should consider their own pharmacogenomics testing to better determine their recommendations and they should consult with their health care providers before making any changes.     As previously mentioned, we inherit our genes from our parents. This means that some of the pharmacogenomic variants found on this test may be shared by other relatives. These results could be helpful to share with other relatives, but it is important to remember that there are many factors that can contribute to how an individual responds to medications. Relatives should consider their own pharmacogenomics testing to better  determine their recommendations and they should consult with their health care providers before making any changes.     Secondary Findings:  This pharmacogenomics panel includes genes involved in other genetic conditions that could impact someone's health.  This test could identify SSZ6D9-cadknet Gilbert syndrome. This is a genetic trait that causes the body to have an impaired ability to process bilirubin, causing it to build-up in the blood, especially during times of stress, illness, or fasting. This usually does not cause symptoms, but can cause elevated bilirubin levels on blood tests. Typically, additional genetic counseling is not needed and primary care providers can manage this condition.    This test could identify F4ZK-vciclzdaez, a genetic condition that causes the body to break down red blood cells before it should. If the body is breaking down blood cells faster than it can replace them, the result is hemolytic anemia. This can cause paleness and yellowing of the skin (jaundice), dark urine, fatigue, shortness of breath, and rapid heart rate. Hemolytic anemia episodes can occur after ingesting bouchra beans and certain processed foods with bouchra bean products in them. Patients with this result may wish to meet with a genetic counselor to discuss other at-risk family members, but typically primary care providers can manage this condition.   This test could identify neuromuscular concerns that go along with RYR1- or AFCUY2S-aoinalv malignant hyperthermia susceptibility. Malignant hyperthermia is a condition that can cause an adverse reaction to certain types of anesthesia. The greatest risks associated with malignant hyperthermia are when an individual is undergoing operations and in the postoperative period. It is helpful to know if an individual has malignant hyperthermia susceptibility so they can avoid certain anesthesia and muscle reactants that can trigger symptoms. While malignant hyperthermia often  occurs in people without other serious medical problems, certain inherited muscle diseases (typically RYR1-related disorders) are associated with malignant hyperthermia susceptibility. Individuals who have a result concerning for malignant hyperthermia susceptibility should return to genetic counseling to discuss these risks and identify at-risk family members.     What pharmacogenomic testing cannot tell us:  This pharmacogenomic testing is not looking at every known pharmacogenomic polymorphism and therefore the results cannot tell us about every possible gene-drug interaction. It is also not looking at genes outside of the scope of pharmacogenomics, and therefore, the results will not tell us if Vikash is at risk for other genetic conditions. If Vikash has questions about a possible underlying genetic condition, he should talk with his primary care provider about seeking an appointment in our Genetics Clinic.     Testing logistics:  [facility] will try to obtain insurance coverage for the pharmacogenomic testing, however this is not always a covered service. A cost waiver form (ARN and Medicare replacement non-coverage form) is required, but cost to the patient is not expected to exceed $250. Vikash requested the CPT codes for this testing, I will send these to him via [employer].    A blood or buccal sample will be collected for DNA analysis. The results of this test take 1-2 weeks. An appointment will be made with the pharmacist to discuss these results in detail and to discuss the medication recommendations based on these results. These test results will be accessible in [employer] and may be viewable prior to the appointment with the pharmacist, but NO CHANGES SHOULD BE MADE TO MEDICATIONS BEFORE TALKING TO THE PHARMACIST OR HEALTHCARE PROVIDER.     Vikash consented to pharmacogenomic testing today. The insurance and billing were explained and Vikash agreed to the billing plan. Due to the fact that this was a virtual  visit, Vikash gave me verbal permission/consent to sign the genetic testing consent form and the cost waiver form (ARN and Medicare replacement non-coverage form) on their behalf.     It was a pleasure meeting Vikash today. He was encouraged to reach out to me if he has any further questions.     Plan:  Vikash will arrange a lab appointment at a [facility] laboratory to have a blood sample drawn for the [facility] Pharmacogenomic Test  I will ask our schedulers to reach out to Vikash when testing is completed and they will assist with scheduling an appointment with the [facility] pharmacist to review results. Vikash can also call [phone] to schedule.    Thi Sanchez MS, [facility]  Genetic Counseling  [facility]  Email: [email or web address]  Phone: [phone]  Fax: [phone]    25 minutes spent on the date of the encounter in chart review, patient visit, test coordination/review, documentation, and/or discussion with other providers about the issues documented above.     This visit was recorded in accordance with the GC-PRO Study. Agreement to participate and option to stop recording at any point reviewed with patient at beginning of visit. Patient elected to proceed.      Please do not hesitate to contact me if you have any questions/concerns.     Sincerely,       Thi Sanchez, GC

## 2025-07-28 NOTE — PATIENT INSTRUCTIONS
Plan:  Vikash will arrange a lab appointment at a [place] laboratory to have a blood sample drawn for the [facility] Pharmacogenomic Test  I will ask our schedulers to reach out to Vikash when testing is completed and they will assist with scheduling an appointment with the [facility] pharmacist to review results. Vikash can also call [phone] to schedule.    Thi Sanchez MS, [facility]  Genetic Counseling  [facility]  Email: [email or web address]  Phone: [phone]  Fax: [phone]

## 2025-07-28 NOTE — PROGRESS NOTES
"Virtual Visit Details    Type of service:  Video Visit   Video Start Time: 1:02 PM  Video End Time:1:14PM  Originating Location (pt. Location): Home  Distant Location (provider location):  On-site  Platform used for Video Visit: Radha    GENETIC COUNSELING CONSULTATION NOTE    Date of visit: 2025    Presenting Information:   Vikash Murillo is a 40 year old male referred to the [facility] at the request of Dr Uma Walton today. Vikash was seen for a genetic counseling appointment to discuss the details of pharmacogenomic testing and to coordinate this testing.     Vikash shared that he has depression and has previously tried multiple selective serotonin reuptake inhibitors without finding a good fit. Initially he tried prozac for 5 weeks without relief (in fact, symptoms worsened). Next he tried lexapro for 3-4 months which initially provided some symptom relief but seemed to \"wear off\" over time. Vikash is interested in this testing to guide next medication selection.    Current Medications:  Current Outpatient Medications   Medication Sig Dispense Refill    fluconazole (DIFLUCAN) 100 MG tablet Take 2 tablets (200 mg) by mouth daily. 28 tablet 0     No current facility-administered medications for this visit.      Family History: A three generation pedigree was obtained and scanned into the electronic medical record. The relevant portions are described below:    Children- None  Siblings- Two, no known notable history  Parents- Father passed at 59 years old due to lung cancer (lifelong smoker). He also had had his gallbladder removed. Mother has osteoporosis.  Maternal Relatives- Grandparents   Paternal Relatives- Grandparents . Grandfather was a  in WW[place] and passed at a young age from a skin condition/cancer? Family attributes his illness to his exposures as a .    Family history is otherwise largely non-contributory.     Genetic Counseling Discussion:  For " review, our bodies are made of cells that contain our chromosomes which are made up of long stretches of DNA containing our genes. Our genes serve as the instructions for our bodies to grow and function. We have two copies of each gene, one inherited from our mother and one inherited from our father.     What pharmacogenomic testing can tell us:  There are several factors that can determine how an individual responds to medications. Some of these factors include environmental factors such as lifestyle choices (diet, alcohol and/or tobacco use) or other medications an individual might be taking, and other factors can include a person's age, sex, ethnic background, disease, and underlying genetic factors. There are several genes in our body that provide instructions for breaking down the medications we take. Changes in these genes (sometimes called variants or polymorphisms/SNPs) can alter how the body breaks down certain medications. For example, a change in a gene can slow down the process of medication breakdown leading to too much of that medication/drug in the body which can cause side effects. On the other hand, a change in a gene can speed up the breakdown of a medication so a therapeutic level is not reached and the medication may not work well at the standard doses. Therefore, identifying changes in these genes via pharmacogenomic testing can help guide which medications might work best for an individual, what dose of a medication may be best, or if a certain medication has a high risk for causing serious side effects.     The pharmacogenomic testing offered at [facility] analyzes several different polymorphisms in different genes associated with drug metabolism. These variants have been determined to be medically actionable by practice guidelines including CPIC (Clinical Pharmacogenetics Implementation Consortium), PharmGKB and/or FDA gene-drug interaction guidelines. Therefore, prescribing  recommendations can be made by the results of this test, so this testing for Vikash is DIAGNOSTIC and is NOT investigational.     The results of this test can provide guidance for several different types of drugs such as antiinflammatories, antithrombotics, lipid-lowering medication, acid-lowering medications, antiemetics, immunosuppressants, antivirals, antifungals, antidepressants, ADHD medications, antimetabolites, and/or hormone antagonists. This means that, while this testing was recommended for a specific reason right now (Vikash's mental health), it may provide guidance for future medication use.     As previously mentioned, we inherit our genes from our parents. This means that some of the pharmacogenomic variants found on this test may be shared by other relatives. These results could be helpful to share with other relatives, but it is important to remember that there are many factors that can contribute to how an individual responds to medications. Relatives should consider their own pharmacogenomics testing to better determine their recommendations and they should consult with their health care providers before making any changes.     As previously mentioned, we inherit our genes from our parents. This means that some of the pharmacogenomic variants found on this test may be shared by other relatives. These results could be helpful to share with other relatives, but it is important to remember that there are many factors that can contribute to how an individual responds to medications. Relatives should consider their own pharmacogenomics testing to better determine their recommendations and they should consult with their health care providers before making any changes.     Secondary Findings:  This pharmacogenomics panel includes genes involved in other genetic conditions that could impact someone's health.  This test could identify [id number]-pnoetir Gilbert syndrome. This is a genetic trait that causes the body  to have an impaired ability to process bilirubin, causing it to build-up in the blood, especially during times of stress, illness, or fasting. This usually does not cause symptoms, but can cause elevated bilirubin levels on blood tests. Typically, additional genetic counseling is not needed and primary care providers can manage this condition.    This test could identify [id number], a genetic condition that causes the body to break down red blood cells before it should. If the body is breaking down blood cells faster than it can replace them, the result is hemolytic anemia. This can cause paleness and yellowing of the skin (jaundice), dark urine, fatigue, shortness of breath, and rapid heart rate. Hemolytic anemia episodes can occur after ingesting bouchra beans and certain processed foods with bouchra bean products in them. Patients with this result may wish to meet with a genetic counselor to discuss other at-risk family members, but typically primary care providers can manage this condition.   This test could identify neuromuscular concerns that go along with RYR1- or [id number]-eliixcy malignant hyperthermia susceptibility. Malignant hyperthermia is a condition that can cause an adverse reaction to certain types of anesthesia. The greatest risks associated with malignant hyperthermia are when an individual is undergoing operations and in the postoperative period. It is helpful to know if an individual has malignant hyperthermia susceptibility so they can avoid certain anesthesia and muscle reactants that can trigger symptoms. While malignant hyperthermia often occurs in people without other serious medical problems, certain inherited muscle diseases (typically RYR1-related disorders) are associated with malignant hyperthermia susceptibility. Individuals who have a result concerning for malignant hyperthermia susceptibility should return to genetic counseling to discuss these risks and identify at-risk family  members.     What pharmacogenomic testing cannot tell us:  This pharmacogenomic testing is not looking at every known pharmacogenomic polymorphism and therefore the results cannot tell us about every possible gene-drug interaction. It is also not looking at genes outside of the scope of pharmacogenomics, and therefore, the results will not tell us if Vikash is at risk for other genetic conditions. If Vikash has questions about a possible underlying genetic condition, he should talk with his primary care provider about seeking an appointment in our Genetics Clinic.     Testing logistics:  [facility] will try to obtain insurance coverage for the pharmacogenomic testing, however this is not always a covered service. A cost waiver form (ARN and Medicare replacement non-coverage form) is required, but cost to the patient is not expected to exceed $250. Vikash requested the CPT codes for this testing, I will send these to him via [employer].    A blood or buccal sample will be collected for DNA analysis. The results of this test take 1-2 weeks. An appointment will be made with the pharmacist to discuss these results in detail and to discuss the medication recommendations based on these results. These test results will be accessible in [employer] and may be viewable prior to the appointment with the pharmacist, but NO CHANGES SHOULD BE MADE TO MEDICATIONS BEFORE TALKING TO THE PHARMACIST OR HEALTHCARE PROVIDER.     Vikash consented to pharmacogenomic testing today. The insurance and billing were explained and Vikash agreed to the billing plan. Due to the fact that this was a virtual visit, Vikash gave me verbal permission/consent to sign the genetic testing consent form and the cost waiver form (ARN and Medicare replacement non-coverage form) on their behalf.     It was a pleasure meeting Vikash today. He was encouraged to reach out to me if he has any further questions.     Plan:  Vikash will arrange a lab appointment at a [facility]  to have a blood sample drawn for the [facility] Pharmacogenomic Test  I will ask our schedulers to reach out to Vikash when testing is completed and they will assist with scheduling an appointment with the [facility] pharmacist to review results. Vikash can also call [phone] to schedule.    Thi Sanchez MS, [facility]  Genetic Counseling  [facility]  Email: [email or web address]  Phone: [phone]  Fax: [phone]    25 minutes spent on the date of the encounter in chart review, patient visit, test coordination/review, documentation, and/or discussion with other providers about the issues documented above.     This visit was recorded in accordance with the GC-PRO Study. Agreement to participate and option to stop recording at any point reviewed with patient at beginning of visit. Patient elected to proceed.

## 2025-07-30 ENCOUNTER — PATIENT OUTREACH (OUTPATIENT)
Dept: CARE COORDINATION | Facility: CLINIC | Age: 40
End: 2025-07-30
Payer: COMMERCIAL

## 2025-08-01 PROCEDURE — 81418 RX METAB GEN SEQ ALYS PNL 6: CPT

## 2025-08-01 PROCEDURE — G0452 MOLECULAR PATHOLOGY INTERPR: HCPCS | Mod: 26 | Performed by: PATHOLOGY

## 2025-08-07 ENCOUNTER — PRE VISIT (OUTPATIENT)
Dept: GASTROENTEROLOGY | Facility: CLINIC | Age: 40
End: 2025-08-07

## 2025-08-07 ENCOUNTER — OFFICE VISIT (OUTPATIENT)
Dept: GASTROENTEROLOGY | Facility: CLINIC | Age: 40
End: 2025-08-07
Attending: FAMILY MEDICINE
Payer: COMMERCIAL

## 2025-08-07 VITALS
DIASTOLIC BLOOD PRESSURE: 59 MMHG | BODY MASS INDEX: 18.74 KG/M2 | HEART RATE: 69 BPM | HEIGHT: 67 IN | SYSTOLIC BLOOD PRESSURE: 90 MMHG | WEIGHT: 119.4 LBS | OXYGEN SATURATION: 97 %

## 2025-08-07 DIAGNOSIS — R10.13 DYSPEPSIA: Primary | ICD-10-CM

## 2025-08-07 DIAGNOSIS — R14.0 ABDOMINAL BLOATING: ICD-10-CM

## 2025-08-07 ASSESSMENT — PATIENT HEALTH QUESTIONNAIRE - PHQ9: SUM OF ALL RESPONSES TO PHQ QUESTIONS 1-9: 4

## 2025-08-07 ASSESSMENT — PAIN SCALES - GENERAL: PAINLEVEL_OUTOF10: NO PAIN (0)

## 2025-08-11 ENCOUNTER — OFFICE VISIT (OUTPATIENT)
Dept: INFECTIOUS DISEASES | Facility: CLINIC | Age: 40
End: 2025-08-11
Attending: INTERNAL MEDICINE
Payer: COMMERCIAL

## 2025-08-11 ENCOUNTER — PATIENT OUTREACH (OUTPATIENT)
Dept: CARE COORDINATION | Facility: CLINIC | Age: 40
End: 2025-08-11
Payer: COMMERCIAL

## 2025-08-11 ENCOUNTER — PRE VISIT (OUTPATIENT)
Dept: INFECTIOUS DISEASES | Facility: CLINIC | Age: 40
End: 2025-08-11
Payer: COMMERCIAL

## 2025-08-11 VITALS
HEIGHT: 67 IN | SYSTOLIC BLOOD PRESSURE: 103 MMHG | HEART RATE: 61 BPM | DIASTOLIC BLOOD PRESSURE: 65 MMHG | WEIGHT: 119.7 LBS | OXYGEN SATURATION: 97 % | BODY MASS INDEX: 18.79 KG/M2

## 2025-08-11 DIAGNOSIS — B37.0 ORAL THRUSH: ICD-10-CM

## 2025-08-11 PROCEDURE — 99204 OFFICE O/P NEW MOD 45 MIN: CPT | Performed by: INTERNAL MEDICINE

## 2025-08-11 PROCEDURE — 3074F SYST BP LT 130 MM HG: CPT | Performed by: INTERNAL MEDICINE

## 2025-08-11 PROCEDURE — G0463 HOSPITAL OUTPT CLINIC VISIT: HCPCS | Performed by: INTERNAL MEDICINE

## 2025-08-11 PROCEDURE — 3078F DIAST BP <80 MM HG: CPT | Performed by: INTERNAL MEDICINE

## 2025-08-11 PROCEDURE — 1126F AMNT PAIN NOTED NONE PRSNT: CPT | Performed by: INTERNAL MEDICINE

## 2025-08-11 ASSESSMENT — PAIN SCALES - GENERAL: PAINLEVEL_OUTOF10: NO PAIN (0)

## 2025-08-13 ENCOUNTER — VIRTUAL VISIT (OUTPATIENT)
Dept: GASTROENTEROLOGY | Facility: CLINIC | Age: 40
End: 2025-08-13
Attending: PHYSICIAN ASSISTANT
Payer: COMMERCIAL

## 2025-08-13 DIAGNOSIS — R10.13 DYSPEPSIA: ICD-10-CM

## 2025-08-13 DIAGNOSIS — R14.0 ABDOMINAL BLOATING: Primary | ICD-10-CM

## 2025-08-13 PROCEDURE — 99207 PR NO CHARGE LOS: CPT | Mod: 95 | Performed by: DIETITIAN, REGISTERED

## 2025-08-13 PROCEDURE — 97802 MEDICAL NUTRITION INDIV IN: CPT | Mod: 95 | Performed by: DIETITIAN, REGISTERED

## 2025-08-14 ENCOUNTER — TELEPHONE (OUTPATIENT)
Dept: GASTROENTEROLOGY | Facility: CLINIC | Age: 40
End: 2025-08-14

## 2025-08-14 ENCOUNTER — OFFICE VISIT (OUTPATIENT)
Dept: OTOLARYNGOLOGY | Facility: CLINIC | Age: 40
End: 2025-08-14
Payer: COMMERCIAL

## 2025-08-14 VITALS
TEMPERATURE: 97.4 F | BODY MASS INDEX: 18.64 KG/M2 | WEIGHT: 119 LBS | HEART RATE: 63 BPM | SYSTOLIC BLOOD PRESSURE: 92 MMHG | DIASTOLIC BLOOD PRESSURE: 56 MMHG

## 2025-08-14 DIAGNOSIS — K13.21 LEUKOPLAKIA, TONGUE: Primary | ICD-10-CM

## 2025-08-14 ASSESSMENT — PAIN SCALES - GENERAL: PAINLEVEL_OUTOF10: NO PAIN (0)
